# Patient Record
Sex: FEMALE | Race: BLACK OR AFRICAN AMERICAN | Employment: OTHER | URBAN - METROPOLITAN AREA
[De-identification: names, ages, dates, MRNs, and addresses within clinical notes are randomized per-mention and may not be internally consistent; named-entity substitution may affect disease eponyms.]

---

## 2017-06-29 ENCOUNTER — HOSPITAL ENCOUNTER (OUTPATIENT)
Dept: GENERAL RADIOLOGY | Age: 67
Discharge: HOME | End: 2017-06-29
Payer: MEDICARE

## 2017-06-29 DIAGNOSIS — G89.29 CHRONIC PAIN: ICD-10-CM

## 2017-06-29 PROCEDURE — 72100 X-RAY EXAM L-S SPINE 2/3 VWS: CPT

## 2017-06-29 PROCEDURE — 72202 X-RAY EXAM SI JOINTS 3/> VWS: CPT

## 2017-06-29 PROCEDURE — 73502 X-RAY EXAM HIP UNI 2-3 VIEWS: CPT

## 2019-07-30 ENCOUNTER — HOSPITAL ENCOUNTER (OUTPATIENT)
Dept: ULTRASOUND IMAGING | Age: 69
Discharge: HOME | End: 2019-07-30
Attending: INTERNAL MEDICINE
Payer: MEDICARE

## 2019-07-30 DIAGNOSIS — N18.30 CHRONIC KIDNEY DISEASE, STAGE III (MODERATE) (HCC): ICD-10-CM

## 2019-07-30 PROCEDURE — 76770 US EXAM ABDO BACK WALL COMP: CPT

## 2019-12-23 ENCOUNTER — OFFICE VISIT (OUTPATIENT)
Dept: INTERNAL MEDICINE CLINIC | Age: 69
End: 2019-12-23

## 2019-12-23 VITALS
TEMPERATURE: 97.5 F | HEIGHT: 65 IN | OXYGEN SATURATION: 94 % | DIASTOLIC BLOOD PRESSURE: 83 MMHG | WEIGHT: 189.4 LBS | RESPIRATION RATE: 18 BRPM | BODY MASS INDEX: 31.56 KG/M2 | HEART RATE: 72 BPM | SYSTOLIC BLOOD PRESSURE: 120 MMHG

## 2019-12-23 DIAGNOSIS — R73.03 PREDIABETES: ICD-10-CM

## 2019-12-23 DIAGNOSIS — M54.50 CHRONIC LOW BACK PAIN, UNSPECIFIED BACK PAIN LATERALITY, UNSPECIFIED WHETHER SCIATICA PRESENT: ICD-10-CM

## 2019-12-23 DIAGNOSIS — R79.9 ABNORMAL FINDING OF BLOOD CHEMISTRY, UNSPECIFIED: ICD-10-CM

## 2019-12-23 DIAGNOSIS — R21 RASH: ICD-10-CM

## 2019-12-23 DIAGNOSIS — M06.9 RHEUMATOID ARTHRITIS, INVOLVING UNSPECIFIED SITE, UNSPECIFIED RHEUMATOID FACTOR PRESENCE: ICD-10-CM

## 2019-12-23 DIAGNOSIS — Z13.31 SCREENING FOR DEPRESSION: ICD-10-CM

## 2019-12-23 DIAGNOSIS — Z13.39 SCREENING FOR ALCOHOLISM: ICD-10-CM

## 2019-12-23 DIAGNOSIS — M19.90 ARTHRITIS: ICD-10-CM

## 2019-12-23 DIAGNOSIS — I10 ESSENTIAL HYPERTENSION: ICD-10-CM

## 2019-12-23 DIAGNOSIS — N18.30 CKD (CHRONIC KIDNEY DISEASE), STAGE III (HCC): ICD-10-CM

## 2019-12-23 DIAGNOSIS — J40 BRONCHITIS: ICD-10-CM

## 2019-12-23 DIAGNOSIS — Z00.00 MEDICARE ANNUAL WELLNESS VISIT, SUBSEQUENT: Primary | ICD-10-CM

## 2019-12-23 DIAGNOSIS — L98.492 ULCER OF RIGHT GROIN WITH FAT LAYER EXPOSED (HCC): ICD-10-CM

## 2019-12-23 DIAGNOSIS — G89.29 CHRONIC LOW BACK PAIN, UNSPECIFIED BACK PAIN LATERALITY, UNSPECIFIED WHETHER SCIATICA PRESENT: ICD-10-CM

## 2019-12-23 DIAGNOSIS — E78.00 HIGH CHOLESTEROL: ICD-10-CM

## 2019-12-23 RX ORDER — LEFLUNOMIDE 10 MG/1
TABLET ORAL
COMMUNITY
Start: 2019-10-15 | End: 2020-03-23

## 2019-12-23 RX ORDER — CALCIFEDIOL 30 UG/1
CAPSULE, EXTENDED RELEASE ORAL
COMMUNITY
Start: 2019-12-06 | End: 2021-03-09

## 2019-12-23 NOTE — PROGRESS NOTES
1. Have you been to the ER, urgent care clinic since your last visit? Hospitalized since your last visit? No    2. Have you seen or consulted any other health care providers outside of the [address] [place] [address] since your last visit? Include any pap smears or colon screening. No   Patient has a message in her phone for doctor from her daughter  Patient want to discuss facial dryness  This is the Subsequent Medicare Annual Wellness Exam, performed 12 months or more after the Initial AWV or the last Subsequent AWV    I have reviewed the patient's medical history in detail and updated the computerized patient record. History   There is no problem list on file for this patient. Past Medical History:   Diagnosis Date    Arthritis     Bronchitis     Chronic back pain     High cholesterol     Hypertension     Kidney disease     Obesity       Past Surgical History:   Procedure Laterality Date    BREAST SURGERY PROCEDURE UNLISTED      breast reduction    HC BLD ROTATABLE 4MM HR      HX MOHS PROCEDURES      right    PLASTY KNEE,CONSTRAINED PROSTHESIS      IA CORRECT BUNION,SIMPLE       Current Outpatient Medications   Medication Sig Dispense Refill    RAYALDEE 30 mcg Cs24 TAKE 1 CAPSULE BY MOUTH EVERY DAY      leflunomide (ARAVA) 10 mg tablet TAKE 1 TABLET BY MOUTH EVERY DAY      lidocaine (LIDODERM) 5 % Apply patch to the affected area for 12 hours a day and remove for 12 hours a day. 3 Each 0    cyclobenzaprine (FLEXERIL) 10 mg tablet Take 1 Tab by mouth three (3) times daily as needed for Muscle Spasm(s). 10 Tab 0    amLODIPine (NORVASC) 10 mg tablet Take 10 mg by mouth daily.  fluticasone-salmeterol (ADVAIR DISKUS) 250-50 mcg/dose diskus inhaler Take 1 Puff by inhalation every twelve (12) hours.  fluticasone (FLONASE) 50 mcg/actuation nasal spray 2 Sprays by Both Nostrils route nightly.  furosemide (LASIX) 40 mg tablet Take 40 mg by mouth daily.       potassium chloride SA (MICRO-K) 10 mEq capsule Take 10 mEq by mouth.  traMADol-acetaminophen (ULTRACET) 37.5-325 mg per tablet Take 1 Tab by mouth every six (6) hours as needed for Pain. Max Daily Amount: 4 Tabs. 20 Tab 0    methocarbamol (ROBAXIN) 500 mg tablet Take 1 Tab by mouth four (4) times daily. 30 Tab 0    oxyCODONE-acetaminophen (PERCOCET) 5-325 mg per tablet Take 1 Tab by mouth every four (4) hours as needed for Pain. Max Daily Amount: 6 Tabs. 20 Tab 0    aspirin 81 mg chewable tablet Take 81 mg by mouth daily. Allergies   Allergen Reactions    Pcn [Penicillins] Swelling       Family History   Problem Relation Age of Onset    Dementia Mother     Alcohol abuse Father      Social History     Tobacco Use    Smoking status: Former Smoker     Last attempt to quit: 1993     Years since quittin.5    Smokeless tobacco: Never Used   Substance Use Topics    Alcohol use: Yes     Comment: social       Depression Risk Factor Screening:     3 most recent PHQ Screens 2019   Little interest or pleasure in doing things Not at all   Feeling down, depressed, irritable, or hopeless Not at all   Total Score PHQ 2 0       Alcohol Risk Factor Screening:   Do you average 1 drink per night or more than 7 drinks a week:  No    On any one occasion in the past three months have you have had more than 3 drinks containing alcohol:  No      Functional Ability and Level of Safety:   Hearing: Hearing is good. Activities of Daily Living: The home contains: no safety equipment. Patient does total self care    Ambulation: with mild difficulty    Fall Risk:  Fall Risk Assessment, last 12 mths 2019   Able to walk? Yes   Fall in past 12 months?  No       Abuse Screen:  Patient is not abused    Cognitive Screening   Has your family/caregiver stated any concerns about your memory: no  Cognitive Screening: Normal - MMSE (Mini Mental Status Exam)    Patient Care Team   Patient Care Team:  Bong Benavides MD as PCP - General (Internal Medicine)    Assessment/Plan   Education and counseling provided:  Are appropriate based on today's review and evaluation        Health Maintenance Due   Topic Date Due    Hepatitis C Screening  1950    BREAST CANCER SCRN MAMMOGRAM  04/30/1998    FOBT Q 1 YEAR AGE 50-75  04/30/1998    GLAUCOMA SCREENING Q2Y  04/30/2013    Bone Densitometry (Dexa) Screening  04/30/2013    MEDICARE YEARLY EXAM  03/20/2018

## 2019-12-23 NOTE — PATIENT INSTRUCTIONS
Medicare Wellness Visit, Female The best way to live healthy is to have a lifestyle where you eat a well-balanced diet, exercise regularly, limit alcohol use, and quit all forms of tobacco/nicotine, if applicable. Regular preventive services are another way to keep healthy. Preventive services (vaccines, screening tests, monitoring & exams) can help personalize your care plan, which helps you manage your own care. Screening tests can find health problems at the earliest stages, when they are easiest to treat. [facility] follows the current, evidence-based guidelines published by the [facility] ([facility]) when recommending preventive services for our patients. Because we follow these guidelines, sometimes recommendations change over time as research supports it. (For example, mammograms used to be recommended annually. Even though Medicare will still pay for an annual mammogram, the newer guidelines recommend a mammogram every two years for women of average risk). Of course, you and your doctor may decide to screen more often for some diseases, based on your risk and your co-morbidities (chronic disease you are already diagnosed with). Preventive services for you include: - Medicare offers their members a free annual wellness visit, which is time for you and your primary care provider to discuss and plan for your preventive service needs. Take advantage of this benefit every year! 
-All adults over the age of 72 should receive the recommended pneumonia vaccines. Current USPSTF guidelines recommend a series of two vaccines for the best pneumonia protection.  
-All adults should have a flu vaccine yearly and a tetanus vaccine every 10 years.  
-All adults age 48 and older should receive the shingles vaccines (series of two vaccines). -All adults age 38-68 who are overweight should have a diabetes screening test once every three years. -All adults born between [birth year] and 1967 should be screened once for Hepatitis C. 
-Other screening tests and preventive services for persons with diabetes include: an eye exam to screen for diabetic retinopathy, a kidney function test, a foot exam, and stricter control over your cholesterol.  
-Cardiovascular screening for adults with routine risk involves an electrocardiogram (ECG) at intervals determined by your doctor.  
-Colorectal cancer screenings should be done for adults age 54-65 with no increased risk factors for colorectal cancer. There are a number of acceptable methods of screening for this type of cancer. Each test has its own benefits and drawbacks. Discuss with your doctor what is most appropriate for you during your annual wellness visit. The different tests include: colonoscopy (considered the best screening method), a fecal occult blood test, a fecal DNA test, and sigmoidoscopy. 
 
-A bone mass density test is recommended when a woman turns 65 to screen for osteoporosis. This test is only recommended one time, as a screening. Some providers will use this same test as a disease monitoring tool if you already have osteoporosis. -Breast cancer screenings are recommended every other year for women of normal risk, age 54-69. 
-Cervical cancer screenings for women over age 72 are only recommended with certain risk factors. Here is a list of your current Health Maintenance items (your personalized list of preventive services) with a due date: 
Health Maintenance Due Topic Date Due  
 Hepatitis C Test  1950  Mammogram  04/30/1998  Stool testing for trace blood  04/30/1998  Glaucoma Screening   04/30/2013  Bone Mineral Density   04/30/2013 [address] [place] [address] Annual Well Visit  03/20/2018 Body Mass Index: Care Instructions Your Care Instructions Body mass index (BMI) can help you see if your weight is raising your risk for health problems. It uses a formula to compare how much you weigh with how tall you are. · A BMI lower than 18.5 is considered underweight. · A BMI between 18.5 and 24.9 is considered healthy. · A BMI between 25 and 29.9 is considered overweight. A BMI of 30 or higher is considered obese. If your BMI is in the normal range, it means that you have a lower risk for weight-related health problems. If your BMI is in the overweight or obese range, you may be at increased risk for weight-related health problems, such as high blood pressure, heart disease, stroke, arthritis or joint pain, and diabetes. If your BMI is in the underweight range, you may be at increased risk for health problems such as fatigue, lower protection (immunity) against illness, muscle loss, bone loss, hair loss, and hormone problems. BMI is just one measure of your risk for weight-related health problems. You may be at higher risk for health problems if you are not active, you eat an unhealthy diet, or you drink too much alcohol or use tobacco products. Follow-up care is a key part of your treatment and safety. Be sure to make and go to all appointments, and call your doctor if you are having problems. It's also a good idea to know your test results and keep a list of the medicines you take. How can you care for yourself at home? · Practice healthy eating habits. This includes eating plenty of fruits, vegetables, whole grains, lean protein, and low-fat dairy. · If your doctor recommends it, get more exercise. Walking is a good choice. Bit by bit, increase the amount you walk every day. Try for at least 30 minutes on most days of the week. · Do not smoke. Smoking can increase your risk for health problems. If you need help quitting, talk to your doctor about stop-smoking programs and medicines. These can increase your chances of quitting for good. · Limit alcohol to 2 drinks a day for men and 1 drink a day for women.  Too much alcohol can cause health problems. If you have a BMI higher than 25 · Your doctor may do other tests to check your risk for weight-related health problems. This may include measuring the distance around your waist. A waist measurement of more than 40 inches in men or 35 inches in women can increase the risk of weight-related health problems. · Talk with your doctor about steps you can take to stay healthy or improve your health. You may need to make lifestyle changes to lose weight and stay healthy, such as changing your diet and getting regular exercise. If you have a BMI lower than 18.5 · Your doctor may do other tests to check your risk for health problems. · Talk with your doctor about steps you can take to stay healthy or improve your health. You may need to make lifestyle changes to gain or maintain weight and stay healthy, such as getting more healthy foods in your diet and doing exercises to build muscle. Where can you learn more? Go to http://remedios-jinny.info/. Enter S176 in the search box to learn more about \"Body Mass Index: Care Instructions. \" Current as of: October 13, 2016 Content Version: 11.4 © 3945-0414 [employer]. Care instructions adapted under license by [employer] (which disclaims liability or warranty for this information). If you have questions about a medical condition or this instruction, always ask your healthcare professional. [address] any warranty or liability for your use of this information.

## 2019-12-23 NOTE — PROGRESS NOTES
SPORTS MEDICINE AND PRIMARY CARE  Jethro Alegria MD, [address] [address]  Phone:  [phone]  Fax: [phone]    Chief Complaint   Patient presents with    Establish Care       SUBJECTIVE:    Jany Aleman is a 68 y.o. female Patient comes in as a new patient for evaluation and ongoing care. She was previously under the service of Vicky Strange, who recently retired. She sees Josue Byrd for pain management of her back pain. She goes to LIDYA Craven MD at [facility] for management of rheumatoid arthritis. She complains of a rash on her forehead. She also complains of an ulcer in the right groin area. Patient is seen for evaluation. Current Outpatient Medications   Medication Sig Dispense Refill    RAYALDEE 30 mcg Cs24 TAKE 1 CAPSULE BY MOUTH EVERY DAY      leflunomide (ARAVA) 10 mg tablet TAKE 1 TABLET BY MOUTH EVERY DAY      bacitracin-neomycin-polymyxin b-hydrocortisone (CORTISPORIN) 1 % ointment Apply  to affected area two (2) times a day. 15 g 11    lidocaine (LIDODERM) 5 % Apply patch to the affected area for 12 hours a day and remove for 12 hours a day. 3 Each 0    cyclobenzaprine (FLEXERIL) 10 mg tablet Take 1 Tab by mouth three (3) times daily as needed for Muscle Spasm(s). 10 Tab 0    amLODIPine (NORVASC) 10 mg tablet Take 10 mg by mouth daily.  fluticasone-salmeterol (ADVAIR DISKUS) 250-50 mcg/dose diskus inhaler Take 1 Puff by inhalation every twelve (12) hours.  fluticasone (FLONASE) 50 mcg/actuation nasal spray 2 Sprays by Both Nostrils route nightly.  furosemide (LASIX) 40 mg tablet Take 40 mg by mouth daily.  potassium chloride SA (MICRO-K) 10 mEq capsule Take 10 mEq by mouth.  traMADol-acetaminophen (ULTRACET) 37.5-325 mg per tablet Take 1 Tab by mouth every six (6) hours as needed for Pain. Max Daily Amount: 4 Tabs. 20 Tab 0    methocarbamol (ROBAXIN) 500 mg tablet Take 1 Tab by mouth four (4) times daily.  30 Tab 0  oxyCODONE-acetaminophen (PERCOCET) 5-325 mg per tablet Take 1 Tab by mouth every four (4) hours as needed for Pain. Max Daily Amount: 6 Tabs. 20 Tab 0    aspirin 81 mg chewable tablet Take 81 mg by mouth daily.          Past Medical History:   Diagnosis Date    Bronchitis     Chronic back pain     CKD (chronic kidney disease), stage III (HCC)     High cholesterol     Hypertension     Obesity     Rheumatoid arthritis ([facility]     S/P TKR (total knee replacement), right     md yfn    Ulcer of right groin ([facility]      Past Surgical History:   Procedure Laterality Date    BREAST SURGERY PROCEDURE UNLISTED      breast reduction    HC BLD ROTATABLE 4MM HR      HX MOHS PROCEDURES      right    PLASTY KNEE,CONSTRAINED PROSTHESIS      OH CORRECT BUNION,SIMPLE       Allergies   Allergen Reactions    Pcn [Penicillins] Swelling       REVIEW OF SYSTEMS:  General: negative for - chills or fever  ENT: negative for - headaches, nasal congestion or tinnitus  Respiratory: negative for - cough, hemoptysis, shortness of breath or wheezing  Cardiovascular : negative for - chest pain, edema, palpitations or shortness of breath  Gastrointestinal: negative for - abdominal pain, blood in stools, heartburn or nausea/vomiting  Genito-Urinary: no dysuria, trouble voiding, or hematuria  Musculoskeletal: negative for - gait disturbance, joint pain, joint stiffness or joint swelling  Neurological: no TIA or stroke symptoms  Hematologic: no bruises, no bleeding, no swollen glands  Integument: no lumps, mole changes, nail changes or rash  Endocrine:no malaise/lethargy or unexpected weight changes      Social History     Socioeconomic History    Marital status: UNKNOWN     Spouse name: Not on file    Number of children: Not on file    Years of education: Not on file    Highest education level: Not on file   Tobacco Use    Smoking status: Former Smoker     Last attempt to quit: 1993     Years since quittin.5    Smokeless tobacco: Never Used   Substance and Sexual Activity    Alcohol use: Yes     Comment: social    Drug use: No    Sexual activity: Yes     Partners: Male     Family History   Problem Relation Age of Onset    Dementia Mother     Alcohol abuse Father      Habits:  She discontinued cigarettes about 25 years ago. She has occasional beer or glass of wine. No drug abuse. Social History:  The patient is . Her daughter lives with her, who also takes care of her. She has four children, two sons and two daughters, alive and well. 45year old daughter  of MI. She has 17 grandchildren and 6 great grandchildren. Completed her GED and some college. Yazidism preference is Bahai.    Family History:  Father  77 of seizure disorder, alcohol abuse. Mother  66 with dementia. One brother . OBJECTIVE:     Visit Vitals  /83   Pulse 72   Temp 97.5 °F (36.4 °C) (Oral)   Resp 18   Ht 5' 4.5\" (1.638 m)   Wt 189 lb 6.4 oz (85.9 kg)   SpO2 94%   BMI 32.01 kg/m²     CONSTITUTIONAL: well , well nourished, appears age appropriate  EYES: perrla, eom intact  ENMT:moist mucous membranes, pharynx clear  NECK: supple. Thyroid normal  RESPIRATORY: Chest: clear bilaterally  CARDIOVASCULAR: Heart: regular rate and rhythm  GASTROINTESTINAL: Abdomen: soft, bowel sounds active  HEMATOLOGIC: no pathological lymph nodes palpated  MUSCULOSKELETAL: Extremities: no edema, pulse 1+   INTEGUMENT: No unusual rashes or suspicious skin lesions noted. Nails appear normal.  NEUROLOGIC: non-focal exam   MENTAL STATUS: alert and oriented, appropriate affect     No visits with results within 3 Month(s) from this visit. Latest known visit with results is:   Admission on 2015, Discharged on 2015   Component Date Value Ref Range Status    Calcium, ionized (POC) 2015 1.20  1. 12 - 1.32 MMOL/L Final    Sodium (POC) 2015 139  136 - 145 MMOL/L Final    Potassium (POC) 07/28/2015 4.2  3.5 - 5.1 MMOL/L Final    Chloride (POC) 07/28/2015 106  98 - 107 MMOL/L Final    CO2 (POC) 07/28/2015 21  21 - 32 MMOL/L Final    Anion gap (POC) 07/28/2015 16* 5 - 15 mmol/L Final    Glucose (POC) 07/28/2015 93  65 - 105 MG/DL Final    BUN (POC) 07/28/2015 17  9 - 20 MG/DL Final    Creatinine (POC) 07/28/2015 0.8  0.6 - 1.3 MG/DL Final    GFRAA, POC 07/28/2015 >60  >60 ml/min/1.73m2 Final    GFRNA, POC 07/28/2015 >60  >60 ml/min/1.73m2 Final    Comment: Estimated GFR is calculated using the IDMS-traceable Modification of Diet in Renal Disease (MDRD) Study equation, reported for both  Americans (GFRAA) and non- Americans (GFRNA), and normalized to 1.73m2 body surface area. The physician must decide which value applies to the patient. The MDRD study equation should only be used in individuals age 25 or older. It has not been validated for the following: pregnant women, patients with serious comorbid conditions, or on certain medications, or persons with extremes of body size, muscle mass, or nutritional status.  Hemoglobin (POC) 07/28/2015 12.9  11.5 - 16.0 GM/DL Final    Hematocrit (POC) 07/28/2015 38  35.0 - 47.0 % Final    Comment 07/28/2015 Comment Not Indicated. Final       ASSESSMENT:   1. Medicare annual wellness visit, subsequent    2. Screening for alcoholism    3. Screening for depression    4. Essential hypertension    5. High cholesterol    6. CKD (chronic kidney disease), stage III ([address]    7. Chronic low back pain, unspecified back pain laterality, unspecified whether sciatica present    8. Bronchitis    9. Arthritis    10. Rheumatoid arthritis, involving unspecified site, unspecified rheumatoid factor presence ([address]    11. Abnormal finding of blood chemistry, unspecified     12. Prediabetes     13. Ulcer of right groin with fat layer exposed ([address]    14. Rash      BP control is at goal, no adjustments will be made in her medications.     Dyslipidemia will be assessed with a lipid panel. She follows with Dr. Magalys Velasco for chronic dizziness and kidney disease. Will assess her renal function and send her a copy of the results. Chronic low back pain is followed by Lali Griffith MD.    Rheumatoid arthritis [place] [facility]. Will ask for serologic studies to determine if it is sero positive or sero negative. She has a superficial groin ulcer, for which we suggest Bacitracin ointment and dressing changes till healed. She is concerned about a rash on the temporal area, wonders if it is medication related. Will ask dermatology to give us their opinion. She will be back to see us about every three months. Appropriate lab studies will be requested and sent to her in the mail. She is asking for support stockings, for which we send a supply order, but not sure insurance will pay for that. Discussed the patient's BMI with her. The BMI follow up plan is as follows:     dietary management education, guidance, and counseling  encourage exercise  monitor weight  prescribed dietary intake    An After Visit Summary was printed and given to the patient. I have discussed the diagnosis with the patient and the intended plan as seen in the  orders above. The patient understands and agees with the plan. The patient has   received an after visit summary and questions were answered concerning  future plans  Patient labs and/or xrays were reviewed  Past records were reviewed.     PLAN:  .  Orders Placed This Encounter    Depression Screen Annual    AMB SUPPLY ORDER    HEMOGLOBIN A1C WITH EAG    URINALYSIS W/ RFLX MICROSCOPIC    CBC WITH AUTOMATED DIFF    METABOLIC PANEL, COMPREHENSIVE    LIPID PANEL    TSH 3RD GENERATION    RA + CCP ABS    ANDRA W/REFLEX    REFERRAL TO DERMATOLOGY    AMB POC EKG ROUTINE W/ 12 LEADS, INTER & REP    RAYALDEE 30 mcg Cs24    leflunomide (ARAVA) 10 mg tablet    bacitracin-neomycin-polymyxin b-hydrocortisone (CORTISPORIN) 1 % ointment       Follow-up and Dispositions    · Return in about 3 months (around 3/23/2020). ATTENTION:   This medical record was transcribed using an electronic medical records system. Although proofread, it may and can contain electronic and spelling errors. Other human spelling and other errors may be present. Corrections may be executed at a later time. Please feel free to contact us for any clarifications as needed.

## 2019-12-25 LAB
ALBUMIN SERPL-MCNC: 4 G/DL (ref 3.5–4.8)
ALBUMIN/GLOB SERPL: 1.5 {RATIO} (ref 1.2–2.2)
ALP SERPL-CCNC: 86 IU/L (ref 39–117)
ALT SERPL-CCNC: 18 IU/L (ref 0–32)
ANA SER QL: NEGATIVE
APPEARANCE UR: ABNORMAL
AST SERPL-CCNC: 30 IU/L (ref 0–40)
BACTERIA #/AREA URNS HPF: ABNORMAL /[HPF]
BASOPHILS # BLD AUTO: 0.1 X10E3/UL (ref 0–0.2)
BASOPHILS NFR BLD AUTO: 1 %
BILIRUB SERPL-MCNC: 0.3 MG/DL (ref 0–1.2)
BILIRUB UR QL STRIP: NEGATIVE
BUN SERPL-MCNC: 16 MG/DL (ref 8–27)
BUN/CREAT SERPL: 10 (ref 12–28)
CALCIUM SERPL-MCNC: 10 MG/DL (ref 8.7–10.3)
CASTS URNS MICRO: ABNORMAL
CASTS URNS QL MICRO: PRESENT /LPF
CCP IGA+IGG SERPL IA-ACNC: 9 UNITS (ref 0–19)
CHLORIDE SERPL-SCNC: 101 MMOL/L (ref 96–106)
CHOLEST SERPL-MCNC: 203 MG/DL (ref 100–199)
CO2 SERPL-SCNC: 24 MMOL/L (ref 20–29)
COLOR UR: YELLOW
CREAT SERPL-MCNC: 1.6 MG/DL (ref 0.57–1)
CRYSTALS URNS MICRO: ABNORMAL
EOSINOPHIL # BLD AUTO: 0.2 X10E3/UL (ref 0–0.4)
EOSINOPHIL NFR BLD AUTO: 2 %
EPI CELLS #/AREA URNS HPF: ABNORMAL /HPF (ref 0–10)
ERYTHROCYTE [DISTWIDTH] IN BLOOD BY AUTOMATED COUNT: 11.7 % (ref 12.3–15.4)
EST. AVERAGE GLUCOSE BLD GHB EST-MCNC: 103 MG/DL
GLOBULIN SER CALC-MCNC: 2.7 G/DL (ref 1.5–4.5)
GLUCOSE SERPL-MCNC: 93 MG/DL (ref 65–99)
GLUCOSE UR QL: NEGATIVE
HBA1C MFR BLD: 5.2 % (ref 4.8–5.6)
HCT VFR BLD AUTO: 36.9 % (ref 34–46.6)
HDLC SERPL-MCNC: 54 MG/DL
HGB BLD-MCNC: 12.3 G/DL (ref 11.1–15.9)
HGB UR QL STRIP: NEGATIVE
IMM GRANULOCYTES # BLD AUTO: 0 X10E3/UL (ref 0–0.1)
IMM GRANULOCYTES NFR BLD AUTO: 0 %
KETONES UR QL STRIP: NEGATIVE
LDLC SERPL CALC-MCNC: 128 MG/DL (ref 0–99)
LEUKOCYTE ESTERASE UR QL STRIP: ABNORMAL
LYMPHOCYTES # BLD AUTO: 2 X10E3/UL (ref 0.7–3.1)
LYMPHOCYTES NFR BLD AUTO: 28 %
MCH RBC QN AUTO: 32 PG (ref 26.6–33)
MCHC RBC AUTO-ENTMCNC: 33.3 G/DL (ref 31.5–35.7)
MCV RBC AUTO: 96 FL (ref 79–97)
MICRO URNS: ABNORMAL
MONOCYTES # BLD AUTO: 0.4 X10E3/UL (ref 0.1–0.9)
MONOCYTES NFR BLD AUTO: 6 %
MUCOUS THREADS URNS QL MICRO: PRESENT
NEUTROPHILS # BLD AUTO: 4.6 X10E3/UL (ref 1.4–7)
NEUTROPHILS NFR BLD AUTO: 63 %
NITRITE UR QL STRIP: NEGATIVE
PH UR STRIP: 6 [PH] (ref 5–7.5)
PLATELET # BLD AUTO: 221 X10E3/UL (ref 150–450)
POTASSIUM SERPL-SCNC: 4.1 MMOL/L (ref 3.5–5.2)
PROT SERPL-MCNC: 6.7 G/DL (ref 6–8.5)
PROT UR QL STRIP: NEGATIVE
RBC # BLD AUTO: 3.84 X10E6/UL (ref 3.77–5.28)
RBC #/AREA URNS HPF: ABNORMAL /HPF (ref 0–2)
RHEUMATOID FACT SERPL-ACNC: <10 IU/ML (ref 0–13.9)
SODIUM SERPL-SCNC: 142 MMOL/L (ref 134–144)
SP GR UR: 1.02 (ref 1–1.03)
TRIGL SERPL-MCNC: 104 MG/DL (ref 0–149)
TSH SERPL DL<=0.005 MIU/L-ACNC: 0.7 UIU/ML (ref 0.45–4.5)
UNIDENT CRYS URNS QL MICRO: PRESENT
UROBILINOGEN UR STRIP-MCNC: 1 MG/DL (ref 0.2–1)
VLDLC SERPL CALC-MCNC: 21 MG/DL (ref 5–40)
WBC # BLD AUTO: 7.3 X10E3/UL (ref 3.4–10.8)
WBC #/AREA URNS HPF: ABNORMAL /HPF (ref 0–5)

## 2020-01-13 RX ORDER — LEFLUNOMIDE 10 MG/1
TABLET ORAL
Qty: 90 TAB | Refills: 0 | OUTPATIENT
Start: 2020-01-13

## 2020-03-23 ENCOUNTER — OFFICE VISIT (OUTPATIENT)
Dept: INTERNAL MEDICINE CLINIC | Age: 70
End: 2020-03-23

## 2020-03-23 VITALS
BODY MASS INDEX: 32.37 KG/M2 | HEIGHT: 64 IN | OXYGEN SATURATION: 94 % | RESPIRATION RATE: 18 BRPM | SYSTOLIC BLOOD PRESSURE: 131 MMHG | DIASTOLIC BLOOD PRESSURE: 89 MMHG | WEIGHT: 189.6 LBS | TEMPERATURE: 97.8 F | HEART RATE: 71 BPM

## 2020-03-23 DIAGNOSIS — I10 ESSENTIAL HYPERTENSION: Primary | ICD-10-CM

## 2020-03-23 DIAGNOSIS — N18.30 CKD (CHRONIC KIDNEY DISEASE), STAGE III (HCC): ICD-10-CM

## 2020-03-23 DIAGNOSIS — N31.9 NEUROGENIC DYSFUNCTION OF THE URINARY BLADDER: ICD-10-CM

## 2020-03-23 DIAGNOSIS — I44.7 LBBB (LEFT BUNDLE BRANCH BLOCK): ICD-10-CM

## 2020-03-23 DIAGNOSIS — Z12.31 ENCOUNTER FOR SCREENING MAMMOGRAM FOR MALIGNANT NEOPLASM OF BREAST: ICD-10-CM

## 2020-03-23 DIAGNOSIS — G89.29 CHRONIC LOW BACK PAIN, UNSPECIFIED BACK PAIN LATERALITY, UNSPECIFIED WHETHER SCIATICA PRESENT: ICD-10-CM

## 2020-03-23 DIAGNOSIS — M54.50 CHRONIC LOW BACK PAIN, UNSPECIFIED BACK PAIN LATERALITY, UNSPECIFIED WHETHER SCIATICA PRESENT: ICD-10-CM

## 2020-03-23 DIAGNOSIS — M06.9 RHEUMATOID ARTHRITIS, INVOLVING UNSPECIFIED SITE, UNSPECIFIED RHEUMATOID FACTOR PRESENCE: ICD-10-CM

## 2020-03-23 DIAGNOSIS — E78.00 HIGH CHOLESTEROL: ICD-10-CM

## 2020-03-23 DIAGNOSIS — M19.90 ARTHRITIS: ICD-10-CM

## 2020-03-23 RX ORDER — CARVEDILOL 12.5 MG/1
12.5 TABLET ORAL 2 TIMES DAILY WITH MEALS
Qty: 60 TAB | Refills: 3 | Status: SHIPPED | OUTPATIENT
Start: 2020-03-23 | End: 2020-06-15

## 2020-03-23 RX ORDER — AMLODIPINE BESYLATE 5 MG/1
5 TABLET ORAL DAILY
Qty: 30 TAB | Refills: 11 | Status: SHIPPED | OUTPATIENT
Start: 2020-03-23 | End: 2021-10-18

## 2020-03-23 RX ORDER — LEFLUNOMIDE 20 MG/1
20 TABLET ORAL DAILY
Qty: 30 TAB | Refills: 1
Start: 2020-03-23

## 2020-03-23 RX ORDER — POTASSIUM CHLORIDE 1.5 G/1.77G
20 POWDER, FOR SOLUTION ORAL DAILY
Qty: 30 PACKET | Refills: 3 | Status: SHIPPED | OUTPATIENT
Start: 2020-03-23 | End: 2020-06-02

## 2020-03-23 NOTE — PROGRESS NOTES
SPORTS MEDICINE AND PRIMARY CARE  Vicente Ontiveros MD, [address] [address]  Phone:  [phone]  Fax: [phone]       Chief Complaint   Patient presents with    Hypertension   . SUBJECTIVE:    Meaghan Torrez is a 68 y.o. female Patient returns today with known history of rheumatoid arthritis, followed by [facility], neurogenic bladder with incontinence and urge incontinence, that is not controlled with Oxybutynin, obesity, primary hypertension, left bundle branch block, dyslipidemia, chronic kidney disease, stage 3, chronic back pain, arthritis, and is seen for evaluation. She complains of urinary incontinence and would like something more to help with her incontinence. Complains of low back pain. This is followed by Dr. Fariha Nielsen office. Denies other new complaints and is seen for evaluation. Current Outpatient Medications   Medication Sig Dispense Refill    RAYALDEE 30 mcg Cs24 TAKE 1 CAPSULE BY MOUTH EVERY DAY      bacitracin-neomycin-polymyxin b-hydrocortisone (CORTISPORIN) 1 % ointment Apply  to affected area two (2) times a day. 15 g 11    lidocaine (LIDODERM) 5 % Apply patch to the affected area for 12 hours a day and remove for 12 hours a day. 3 Each 0    cyclobenzaprine (FLEXERIL) 10 mg tablet Take 1 Tab by mouth three (3) times daily as needed for Muscle Spasm(s). 10 Tab 0    oxyCODONE-acetaminophen (PERCOCET) 5-325 mg per tablet Take 1 Tab by mouth every four (4) hours as needed for Pain. Max Daily Amount: 6 Tabs. 20 Tab 0    fluticasone-salmeterol (ADVAIR DISKUS) 250-50 mcg/dose diskus inhaler Take 1 Puff by inhalation every twelve (12) hours.  fluticasone (FLONASE) 50 mcg/actuation nasal spray 2 Sprays by Both Nostrils route nightly.  furosemide (LASIX) 40 mg tablet Take 40 mg by mouth daily.  amLODIPine (NORVASC) 5 mg tablet Take 1 Tab by mouth daily.  30 Tab 11    carvediloL (COREG) 12.5 mg tablet Take 1 Tab by mouth two (2) times daily (with meals). 60 Tab 3    leflunomide (ARAVA) 20 mg tablet Take 1 Tab by mouth daily. 30 Tab 1    potassium chloride (KLOR-CON) 20 mEq pack Take 1 Packet by mouth daily. 30 Packet 3    traMADol-acetaminophen (ULTRACET) 37.5-325 mg per tablet Take 1 Tab by mouth every six (6) hours as needed for Pain. Max Daily Amount: 4 Tabs. 20 Tab 0    aspirin 81 mg chewable tablet Take 81 mg by mouth daily.          Past Medical History:   Diagnosis Date    Bronchitis     Chronic back pain     simin dixon md    CKD (chronic kidney disease), stage III (HCC)     High cholesterol     Hypertension     mary landaverde md    LBBB (left bundle branch block) 01/29/2012    Neurogenic dysfunction of the urinary bladder     Obesity     Rheumatoid arthritis ([facility]     tiffanie campbell md rheum -vcu    S/P TKR (total knee replacement), right 2007    md yfn    Ulcer of right groin ([facility] [address] [facility]      Past Surgical History:   Procedure Laterality Date    BREAST SURGERY PROCEDURE UNLISTED      breast reduction    HC BLD ROTATABLE 4MM HR      HX MOHS PROCEDURES      right    PLASTY KNEE,CONSTRAINED PROSTHESIS      IL CORRECT BUNION,SIMPLE       Allergies   Allergen Reactions    Pcn [Penicillins] Swelling         REVIEW OF SYSTEMS:  General: negative for - chills or fever  ENT: negative for - headaches, nasal congestion or tinnitus  Respiratory: negative for - cough, hemoptysis, shortness of breath or wheezing  Cardiovascular : negative for - chest pain, edema, palpitations or shortness of breath  Gastrointestinal: negative for - abdominal pain, blood in stools, heartburn or nausea/vomiting  Genito-Urinary: no dysuria, trouble voiding, or hematuria  Musculoskeletal: negative for - gait disturbance, joint pain, joint stiffness or joint swelling  Neurological: no TIA or stroke symptoms  Hematologic: no bruises, no bleeding, no swollen glands  Integument: no lumps, mole changes, nail changes or rash  Endocrine: no malaise/lethargy or unexpected weight changes      Social History     Socioeconomic History    Marital status:      Spouse name: Not on file    Number of children: Not on file    Years of education: Not on file    Highest education level: Not on file   Tobacco Use    Smoking status: Former Smoker     Last attempt to quit: 1993     Years since quittin.7    Smokeless tobacco: Never Used   Substance and Sexual Activity    Alcohol use: Yes     Comment: social    Drug use: No    Sexual activity: Yes     Partners: Male   Social History Narrative    Habits:  She discontinued cigarettes about 25 years ago. She has occasional beer or glass of wine. No drug abuse.         Social History:  The patient is . Her daughter lives with her, who also takes care of her. She has four children, two sons and two daughters, alive and well. 45year old daughter  of MI. She has 17 grandchildren and 6 great grandchildren. Completed her GED and some college. Zoroastrianism preference is Latter day.         Family History:  Father  77 of seizure disorder, alcohol abuse. Mother  66 with dementia. One brother . Family History   Problem Relation Age of Onset    Dementia Mother     Alcohol abuse Father        OBJECTIVE:    Visit Vitals  /89   Pulse 71   Temp 97.8 °F (36.6 °C) (Oral)   Resp 18   Ht 5' 4\" (1.626 m)   Wt 189 lb 9.6 oz (86 kg)   SpO2 94%   BMI 32.54 kg/m²     CONSTITUTIONAL: well , well nourished, appears age appropriate  EYES: perrla, eom intact  ENMT:moist mucous membranes, pharynx clear  NECK: supple. Thyroid normal  RESPIRATORY: Chest: clear bilaterally   CARDIOVASCULAR: Heart: regular rate and rhythm  GASTROINTESTINAL: Abdomen: soft, bowel sounds active  HEMATOLOGIC: no pathological lymph nodes palpated  MUSCULOSKELETAL: Extremities: no edema, pulse 1+   INTEGUMENT: No unusual rashes or suspicious skin lesions noted.  Nails appear normal.  NEUROLOGIC: non-focal exam   MENTAL STATUS: alert and oriented, appropriate affect           ASSESSMENT:  1. Essential hypertension    2. Neurogenic dysfunction of the urinary bladder    3. Rheumatoid arthritis, involving unspecified site, unspecified rheumatoid factor presence ([facility] [address]    4. LBBB (left bundle branch block)    5. High cholesterol    6. CKD (chronic kidney disease), stage III ([facility] [address]    7. Chronic low back pain, unspecified back pain laterality, unspecified whether sciatica present    8. Arthritis    9. Encounter for screening mammogram for malignant neoplasm of breast       Blood pressure control is adequate, no titration is needed. For the neurogenic bladder and urinary incontinence, referral to urology will be requested. Her rheumatoid arthritis is currently quiescent and followed by [facility]. Dyslipidemia, for which she is taking a statin. We continue to monitor kidneys with appropriate lab studies, which will be checked today. The chronic back pain and arthritis pain she is having is treated with narcotics through Dr. Tommie Plasencia office. She will be back to see us in three to four months, sooner if needed. I have discussed the diagnosis with the patient and the intended plan as seen in the  orders above. The patient understands and agees with the plan. The patient has   received an after visit summary and questions were answered concerning  future plans  Patient labs and/or xrays were reviewed  Past records were reviewed. PLAN:  .  Orders Placed This Encounter    AMB SUPPLY ORDER    JOAQUÍN MAMMO BI SCREENING INCL CAD    HEPATITIS C AB    CBC WITH AUTOMATED DIFF    RENAL FUNCTION PANEL    REFERRAL TO UROLOGY    amLODIPine (NORVASC) 5 mg tablet    carvediloL (COREG) 12.5 mg tablet    leflunomide (ARAVA) 20 mg tablet    potassium chloride (KLOR-CON) 20 mEq pack       Follow-up and Dispositions    · Return in about 4 months (around 7/23/2020).                 ATTENTION:   This medical record was transcribed using an electronic medical records system. Although proofread, it may and can contain electronic and spelling errors. Other human spelling and other errors may be present. Corrections may be executed at a later time. Please feel free to contact us for any clarifications as needed.

## 2020-03-23 NOTE — ASSESSMENT & PLAN NOTE
This condition is managed by Specialist.  Lab Results   Component Value Date/Time    WBC 7.3 12/23/2019 12:37 PM    HGB 12.3 12/23/2019 12:37 PM    HCT 36.9 12/23/2019 12:37 PM    PLATELET 236 85/81/8263 12:37 PM    Creatinine 1.60 12/23/2019 12:37 PM    BUN 16 12/23/2019 12:37 PM    Potassium 4.1 12/23/2019 12:37 PM

## 2020-03-23 NOTE — PROGRESS NOTES
1. Have you been to the ER, urgent care clinic since your last visit? Hospitalized since your last visit? No    2. Have you seen or consulted any other health care providers outside of the [address] [place] [address] since your last visit? Include any pap smears or colon screening.  No     Wants to discuss BP, foot swelling and depends

## 2020-03-24 LAB
ALBUMIN SERPL-MCNC: 3.5 G/DL (ref 3.7–4.7)
BASOPHILS # BLD AUTO: 0 X10E3/UL (ref 0–0.2)
BASOPHILS NFR BLD AUTO: 1 %
BUN SERPL-MCNC: 21 MG/DL (ref 8–27)
BUN/CREAT SERPL: 15 (ref 12–28)
CALCIUM SERPL-MCNC: 9.2 MG/DL (ref 8.7–10.3)
CHLORIDE SERPL-SCNC: 105 MMOL/L (ref 96–106)
CO2 SERPL-SCNC: 25 MMOL/L (ref 20–29)
CREAT SERPL-MCNC: 1.43 MG/DL (ref 0.57–1)
EOSINOPHIL # BLD AUTO: 0.1 X10E3/UL (ref 0–0.4)
EOSINOPHIL NFR BLD AUTO: 2 %
ERYTHROCYTE [DISTWIDTH] IN BLOOD BY AUTOMATED COUNT: 11.8 % (ref 11.7–15.4)
GLUCOSE SERPL-MCNC: 100 MG/DL (ref 65–99)
HCT VFR BLD AUTO: 34.4 % (ref 34–46.6)
HCV AB S/CO SERPL IA: <0.1 S/CO RATIO (ref 0–0.9)
HGB BLD-MCNC: 11.1 G/DL (ref 11.1–15.9)
IMM GRANULOCYTES # BLD AUTO: 0 X10E3/UL (ref 0–0.1)
IMM GRANULOCYTES NFR BLD AUTO: 0 %
LYMPHOCYTES # BLD AUTO: 1.7 X10E3/UL (ref 0.7–3.1)
LYMPHOCYTES NFR BLD AUTO: 24 %
MCH RBC QN AUTO: 29.7 PG (ref 26.6–33)
MCHC RBC AUTO-ENTMCNC: 32.3 G/DL (ref 31.5–35.7)
MCV RBC AUTO: 92 FL (ref 79–97)
MONOCYTES # BLD AUTO: 0.4 X10E3/UL (ref 0.1–0.9)
MONOCYTES NFR BLD AUTO: 6 %
NEUTROPHILS # BLD AUTO: 4.6 X10E3/UL (ref 1.4–7)
NEUTROPHILS NFR BLD AUTO: 67 %
PHOSPHATE SERPL-MCNC: 2.6 MG/DL (ref 3–4.3)
PLATELET # BLD AUTO: 175 X10E3/UL (ref 150–450)
POTASSIUM SERPL-SCNC: 4.3 MMOL/L (ref 3.5–5.2)
RBC # BLD AUTO: 3.74 X10E6/UL (ref 3.77–5.28)
SODIUM SERPL-SCNC: 143 MMOL/L (ref 134–144)
WBC # BLD AUTO: 6.9 X10E3/UL (ref 3.4–10.8)

## 2020-05-27 ENCOUNTER — HOSPITAL ENCOUNTER (OUTPATIENT)
Dept: MAMMOGRAPHY | Age: 70
Discharge: HOME | End: 2020-05-27
Attending: INTERNAL MEDICINE
Payer: MEDICARE

## 2020-05-27 DIAGNOSIS — I10 ESSENTIAL HYPERTENSION: ICD-10-CM

## 2020-05-27 DIAGNOSIS — Z12.31 ENCOUNTER FOR SCREENING MAMMOGRAM FOR MALIGNANT NEOPLASM OF BREAST: ICD-10-CM

## 2020-05-27 PROCEDURE — 77067 SCR MAMMO BI INCL CAD: CPT

## 2020-06-02 RX ORDER — POTASSIUM CHLORIDE 1.5 G/1.77G
POWDER, FOR SOLUTION ORAL
Qty: 90 PACKET | Refills: 1 | Status: SHIPPED | OUTPATIENT
Start: 2020-06-02

## 2020-06-15 RX ORDER — CARVEDILOL 12.5 MG/1
TABLET ORAL
Qty: 180 TAB | Refills: 1 | Status: SHIPPED | OUTPATIENT
Start: 2020-06-15

## 2020-06-23 ENCOUNTER — OFFICE VISIT (OUTPATIENT)
Dept: INTERNAL MEDICINE CLINIC | Age: 70
End: 2020-06-23

## 2020-06-23 VITALS
BODY MASS INDEX: 30.27 KG/M2 | OXYGEN SATURATION: 96 % | SYSTOLIC BLOOD PRESSURE: 143 MMHG | HEART RATE: 69 BPM | TEMPERATURE: 98 F | DIASTOLIC BLOOD PRESSURE: 80 MMHG | HEIGHT: 64 IN | WEIGHT: 177.3 LBS | RESPIRATION RATE: 18 BRPM

## 2020-06-23 DIAGNOSIS — R68.89 OTHER GENERAL SYMPTOMS AND SIGNS: ICD-10-CM

## 2020-06-23 DIAGNOSIS — N18.30 CKD (CHRONIC KIDNEY DISEASE), STAGE III (HCC): ICD-10-CM

## 2020-06-23 DIAGNOSIS — M19.90 ARTHRITIS: ICD-10-CM

## 2020-06-23 DIAGNOSIS — I10 ESSENTIAL HYPERTENSION: ICD-10-CM

## 2020-06-23 DIAGNOSIS — E78.00 HIGH CHOLESTEROL: ICD-10-CM

## 2020-06-23 DIAGNOSIS — M06.9 RHEUMATOID ARTHRITIS, INVOLVING UNSPECIFIED SITE, UNSPECIFIED RHEUMATOID FACTOR PRESENCE: Primary | ICD-10-CM

## 2020-06-23 DIAGNOSIS — N31.9 NEUROGENIC DYSFUNCTION OF THE URINARY BLADDER: ICD-10-CM

## 2020-06-23 RX ORDER — DICLOFENAC SODIUM 10 MG/G
GEL TOPICAL 4 TIMES DAILY
Qty: 1 EACH | Refills: 11
Start: 2020-06-23 | End: 2021-10-18

## 2020-06-23 RX ORDER — FLUTICASONE PROPIONATE AND SALMETEROL 250; 50 UG/1; UG/1
1 POWDER RESPIRATORY (INHALATION) EVERY 12 HOURS
Qty: 1 EACH | Refills: 11 | Status: SHIPPED | OUTPATIENT
Start: 2020-06-23 | End: 2021-06-25 | Stop reason: ALTCHOICE

## 2020-06-23 RX ORDER — ATORVASTATIN CALCIUM 40 MG/1
40 TABLET, FILM COATED ORAL
Qty: 30 TAB | Refills: 11 | Status: SHIPPED | OUTPATIENT
Start: 2020-06-23 | End: 2021-06-22 | Stop reason: SDUPTHER

## 2020-06-23 RX ORDER — FLUTICASONE PROPIONATE 50 MCG
2 SPRAY, SUSPENSION (ML) NASAL DAILY
Qty: 1 BOTTLE | Refills: 11 | Status: SHIPPED | OUTPATIENT
Start: 2020-06-23 | End: 2021-06-22 | Stop reason: SDUPTHER

## 2020-06-23 NOTE — PROGRESS NOTES
1. Have you been to the ER, urgent care clinic since your last visit? Hospitalized since your last visit? No    2. Have you seen or consulted any other health care providers outside of the [address] [place] [address] since your last visit? Include any pap smears or colon screening.  No     Wants to discuss taking a vitamin

## 2020-06-23 NOTE — PROGRESS NOTES
SPORTS MEDICINE AND PRIMARY CARE  Sidney Davis MD, [address] [address]  Phone:  [phone]  Fax: [phone]       Chief Complaint   Patient presents with    Hypertension   . SUBJECTIVE:    Bulmaro Melchor is a 65 y.o. female Patient returns today with a known history of rheumatoid arthritis, followed by Dr. Aleyda Hwakins, neurogenic urinary bladder, primary hypertension, dyslipidemia, CKD stage 3, DJD and is seen for evaluation. She wonders if she needs a B12 shot because she feels tired all the time. She is upset today because her granddaughter fell off a bike at 6years old. She told them to take her to the emergency room, but they did not do so, and the following day she was found to have a fractured hand. This has upset the patient. Patient is seen for evaluation. Current Outpatient Medications   Medication Sig Dispense Refill    fluticasone propion-salmeteroL (Advair Diskus) 250-50 mcg/dose diskus inhaler Take 1 Puff by inhalation every twelve (12) hours. 1 Each 11    fluticasone propionate (Flonase) 50 mcg/actuation nasal spray 2 Sprays by Both Nostrils route daily. 1 Bottle 11    diclofenac (VOLTAREN) 1 % gel Apply  to affected area four (4) times daily. 1 Each 11    atorvastatin (LIPITOR) 40 mg tablet Take 1 Tab by mouth nightly. 30 Tab 11    carvediloL (COREG) 12.5 mg tablet TAKE 1 TABLET BY MOUTH TWICE A DAY WITH MEALS 180 Tab 1    potassium chloride (KLOR-CON) 20 mEq pack TAKE 1 PACKET DAILY BY MOUTH 90 Packet 1    amLODIPine (NORVASC) 5 mg tablet Take 1 Tab by mouth daily. 30 Tab 11    leflunomide (ARAVA) 20 mg tablet Take 1 Tab by mouth daily. 30 Tab 1    RAYALDEE 30 mcg Cs24 TAKE 1 CAPSULE BY MOUTH EVERY DAY      bacitracin-neomycin-polymyxin b-hydrocortisone (CORTISPORIN) 1 % ointment Apply  to affected area two (2) times a day.  15 g 11    cyclobenzaprine (FLEXERIL) 10 mg tablet Take 1 Tab by mouth three (3) times daily as needed for Muscle Spasm(s). 10 Tab 0    oxyCODONE-acetaminophen (PERCOCET) 5-325 mg per tablet Take 1 Tab by mouth every four (4) hours as needed for Pain. Max Daily Amount: 6 Tabs. 20 Tab 0    furosemide (LASIX) 40 mg tablet Take 40 mg by mouth daily.        Past Medical History:   Diagnosis Date    Bronchitis     Chronic back pain     simin dixon md    CKD (chronic kidney disease), stage III ([facility] [address]     High cholesterol     Hypertension     mary landaverde md    LBBB (left bundle branch block) 01/29/2012    Neurogenic dysfunction of the urinary bladder     Obesity     Rheumatoid arthritis ([facility] [address]     tiffanie campbell md rheum -vcu    S/P TKR (total knee replacement), right 2007    md yfn    [facility]      Past Surgical History:   Procedure Laterality Date    BREAST SURGERY PROCEDURE UNLISTED      breast reduction    HC BLD ROTATABLE 4MM HR      HX BREAST REDUCTION Bilateral     2013    HX MOHS PROCEDURES      right    PLASTY KNEE,CONSTRAINED PROSTHESIS      SD CORRECT BUNION,SIMPLE       Allergies   Allergen Reactions    Pcn [Penicillins] Swelling         REVIEW OF SYSTEMS:  General: negative for - chills or fever  ENT: negative for - headaches, nasal congestion or tinnitus  Respiratory: negative for - cough, hemoptysis, shortness of breath or wheezing  Cardiovascular : negative for - chest pain, edema, palpitations or shortness of breath  Gastrointestinal: negative for - abdominal pain, blood in stools, heartburn or nausea/vomiting  Genito-Urinary: no dysuria, trouble voiding, or hematuria  Musculoskeletal: negative for - gait disturbance, joint pain, joint stiffness or joint swelling  Neurological: no TIA or stroke symptoms  Hematologic: no bruises, no bleeding, no swollen glands  Integument: no lumps, mole changes, nail changes or rash  Endocrine: no malaise/lethargy or unexpected weight changes      Social History     Socioeconomic History    Marital status:      Spouse name: Not on file    Number of children: Not on file    Years of education: Not on file    Highest education level: Not on file   Tobacco Use    Smoking status: Former Smoker     Last attempt to quit: 1993     Years since quittin.0    Smokeless tobacco: Never Used   Substance and Sexual Activity    Alcohol use: Yes     Comment: social    Drug use: No    Sexual activity: Yes     Partners: Male   Social History Narrative    Habits:  She discontinued cigarettes about 25 years ago. She has occasional beer or glass of wine. No drug abuse.         Social History:  The patient is . Her daughter lives with her, who also takes care of her. She has four children, two sons and two daughters, alive and well. 45year old daughter  of MI. She has 17 grandchildren and 6 great grandchildren. Completed her GED and some college. Restorationism preference is Restorationist.         Family History:  Father  77 of seizure disorder, alcohol abuse. Mother  66 with dementia. One brother . Family History   Problem Relation Age of Onset    Dementia Mother     Alcohol abuse Father        OBJECTIVE:    Visit Vitals  BP (!) 165/100   Pulse 69   Temp 98 °F (36.7 °C) (Oral)   Resp 18   Ht 5' 4\" (1.626 m)   Wt 177 lb 4.8 oz (80.4 kg)   SpO2 96%   BMI 30.43 kg/m²     CONSTITUTIONAL: well , well nourished, appears age appropriate  EYES: perrla, eom intact  ENMT:moist mucous membranes, pharynx clear  NECK: supple. Thyroid normal  RESPIRATORY: Chest: clear bilaterally   CARDIOVASCULAR: Heart: regular rate and rhythm  GASTROINTESTINAL: Abdomen: soft, bowel sounds active  HEMATOLOGIC: no pathological lymph nodes palpated  MUSCULOSKELETAL: Extremities: no edema, pulse 1+   INTEGUMENT: No unusual rashes or suspicious skin lesions noted. Nails appear normal.  NEUROLOGIC: non-focal exam   MENTAL STATUS: alert and oriented, appropriate affect           ASSESSMENT:  1.  Rheumatoid arthritis, involving unspecified site, unspecified rheumatoid factor presence ([facility] [address]    2. Neurogenic dysfunction of the urinary bladder    3. Essential hypertension    4. High cholesterol    5. CKD (chronic kidney disease), stage III ([address]    6. Arthritis    7. Other general symptoms and signs       The rheumatoid arthritis seems to be controlled with the DMARD she is currently using. She keeps her appointments with the rheumatologist on a regular basis. Neurogenic bladder remains of concern. Blood pressure is up related to the stresses related to her grandchild. Repeat blood pressure, however, is coming down to 143/80. Normally her blood pressure is in the 120-130s and therefore no adjustment will be made. There is a history of dyslipidemia with the last cholesterol in December of 203, triglycerides 104, HDL 54 and . She is not taking a statin currently and we would like to place her on Lipitor 40 mg daily. Chronic kidney disease is followed by Dr. Abel Riedel, stage 3. She will return to the office in three to four months, sooner if needed. We are concerned about her weight loss. She has lost 11 pounds since we last saw her. She states she does not have an appetite and does not eat as much as she used to. Her metabolic evaluation may be helpful. We will also check her thyroid. She states that before she gained all this weight she was around 145. I have discussed the diagnosis with the patient and the intended plan as seen in the  orders above. The patient understands and agees with the plan. The patient has   received an after visit summary and questions were answered concerning  future plans  Patient labs and/or xrays were reviewed  Past records were reviewed.     PLAN:  .  Orders Placed This Encounter    RENAL FUNCTION PANEL    VITAMIN B12 & FOLATE    VITAMIN D, 25 HYDROXY    CBC WITH AUTOMATED DIFF    T4, FREE    REFERRAL TO OPHTHALMOLOGY    fluticasone propion-salmeteroL (Advair Diskus) 250-50 mcg/dose diskus inhaler    fluticasone propionate (Flonase) 50 mcg/actuation nasal spray    diclofenac (VOLTAREN) 1 % gel    atorvastatin (LIPITOR) 40 mg tablet       Follow-up and Dispositions    · Return in about 3 months (around 9/23/2020). ATTENTION:   This medical record was transcribed using an electronic medical records system. Although proofread, it may and can contain electronic and spelling errors. Other human spelling and other errors may be present. Corrections may be executed at a later time. Please feel free to contact us for any clarifications as needed.

## 2020-06-24 LAB
25(OH)D3+25(OH)D2 SERPL-MCNC: 89 NG/ML (ref 30–100)
BASOPHILS # BLD AUTO: 0.1 X10E3/UL (ref 0–0.2)
BASOPHILS NFR BLD AUTO: 1 %
EOSINOPHIL # BLD AUTO: 0.1 X10E3/UL (ref 0–0.4)
EOSINOPHIL NFR BLD AUTO: 2 %
ERYTHROCYTE [DISTWIDTH] IN BLOOD BY AUTOMATED COUNT: 11.8 % (ref 11.7–15.4)
FOLATE SERPL-MCNC: >20 NG/ML
HCT VFR BLD AUTO: 33.1 % (ref 34–46.6)
HGB BLD-MCNC: 11.3 G/DL (ref 11.1–15.9)
IMM GRANULOCYTES # BLD AUTO: 0 X10E3/UL (ref 0–0.1)
IMM GRANULOCYTES NFR BLD AUTO: 0 %
LYMPHOCYTES # BLD AUTO: 2 X10E3/UL (ref 0.7–3.1)
LYMPHOCYTES NFR BLD AUTO: 26 %
MCH RBC QN AUTO: 32.4 PG (ref 26.6–33)
MCHC RBC AUTO-ENTMCNC: 34.1 G/DL (ref 31.5–35.7)
MCV RBC AUTO: 95 FL (ref 79–97)
MONOCYTES # BLD AUTO: 0.5 X10E3/UL (ref 0.1–0.9)
MONOCYTES NFR BLD AUTO: 6 %
NEUTROPHILS # BLD AUTO: 5 X10E3/UL (ref 1.4–7)
NEUTROPHILS NFR BLD AUTO: 65 %
PLATELET # BLD AUTO: 159 X10E3/UL (ref 150–450)
RBC # BLD AUTO: 3.49 X10E6/UL (ref 3.77–5.28)
T4 FREE SERPL-MCNC: 1.13 NG/DL (ref 0.82–1.77)
VIT B12 SERPL-MCNC: 845 PG/ML (ref 232–1245)
WBC # BLD AUTO: 7.7 X10E3/UL (ref 3.4–10.8)

## 2020-08-06 RX ORDER — DIPHENHYDRAMINE HYDROCHLORIDE 25 MG/1
CAPSULE ORAL
Qty: 120 CAP | Refills: 3 | Status: SHIPPED | OUTPATIENT
Start: 2020-08-06 | End: 2021-05-28

## 2020-08-13 RX ORDER — DIPHENHYDRAMINE HCL 50 MG
50 CAPSULE ORAL 2 TIMES DAILY
Qty: 60 CAP | Refills: 5 | Status: SHIPPED | OUTPATIENT
Start: 2020-08-13 | End: 2020-08-23

## 2020-10-21 ENCOUNTER — OFFICE VISIT (OUTPATIENT)
Dept: INTERNAL MEDICINE CLINIC | Age: 70
End: 2020-10-21
Payer: MEDICAID

## 2020-10-21 DIAGNOSIS — G89.29 CHRONIC LOW BACK PAIN, UNSPECIFIED BACK PAIN LATERALITY, UNSPECIFIED WHETHER SCIATICA PRESENT: ICD-10-CM

## 2020-10-21 DIAGNOSIS — M54.50 CHRONIC LOW BACK PAIN, UNSPECIFIED BACK PAIN LATERALITY, UNSPECIFIED WHETHER SCIATICA PRESENT: ICD-10-CM

## 2020-10-21 DIAGNOSIS — N18.31 STAGE 3A CHRONIC KIDNEY DISEASE (HCC): ICD-10-CM

## 2020-10-21 DIAGNOSIS — I10 ESSENTIAL HYPERTENSION: Primary | ICD-10-CM

## 2020-10-21 DIAGNOSIS — E78.00 HIGH CHOLESTEROL: ICD-10-CM

## 2020-10-21 DIAGNOSIS — M06.9 RHEUMATOID ARTHRITIS, INVOLVING UNSPECIFIED SITE, UNSPECIFIED WHETHER RHEUMATOID FACTOR PRESENT (HCC): ICD-10-CM

## 2020-10-21 PROCEDURE — G8432 DEP SCR NOT DOC, RNG: HCPCS | Performed by: INTERNAL MEDICINE

## 2020-10-21 PROCEDURE — 3017F COLORECTAL CA SCREEN DOC REV: CPT | Performed by: INTERNAL MEDICINE

## 2020-10-21 PROCEDURE — G8753 SYS BP > OR = 140: HCPCS | Performed by: INTERNAL MEDICINE

## 2020-10-21 PROCEDURE — G8536 NO DOC ELDER MAL SCRN: HCPCS | Performed by: INTERNAL MEDICINE

## 2020-10-21 PROCEDURE — 1101F PT FALLS ASSESS-DOCD LE1/YR: CPT | Performed by: INTERNAL MEDICINE

## 2020-10-21 PROCEDURE — G8755 DIAS BP > OR = 90: HCPCS | Performed by: INTERNAL MEDICINE

## 2020-10-21 PROCEDURE — 1090F PRES/ABSN URINE INCON ASSESS: CPT | Performed by: INTERNAL MEDICINE

## 2020-10-21 PROCEDURE — G8417 CALC BMI ABV UP PARAM F/U: HCPCS | Performed by: INTERNAL MEDICINE

## 2020-10-21 PROCEDURE — 99214 OFFICE O/P EST MOD 30 MIN: CPT | Performed by: INTERNAL MEDICINE

## 2020-10-21 PROCEDURE — G9899 SCRN MAM PERF RSLTS DOC: HCPCS | Performed by: INTERNAL MEDICINE

## 2020-10-21 PROCEDURE — G8400 PT W/DXA NO RESULTS DOC: HCPCS | Performed by: INTERNAL MEDICINE

## 2020-10-21 PROCEDURE — G8427 DOCREV CUR MEDS BY ELIG CLIN: HCPCS | Performed by: INTERNAL MEDICINE

## 2020-10-21 NOTE — PROGRESS NOTES
SPORTS MEDICINE AND PRIMARY CARE  Arabella Sanchez MD, [address] [address]  Phone:  [phone]  Fax: [phone]       Chief Complaint   Patient presents with    Hypertension   . SUBJECTIVE:    Sabrina Fallon is a 65 y.o. female Patient returns today with a known history of primary hypertension, CKD stage 3, obesity, dyslipidemia, chronic back pain, followed by Dr. Jacquelin Royal, rheumatoid arthritis, followed by [facility], and is seen for evaluation. Patient returns today saying she saw Dr. Rambo Chiang yesterday at [facility] for management of her chronic pain. She was previously seeing Dr. Karyn Steen for the pain, but he no longer takes her insurance. Dr. Rambo Chiang thought she may have compression fracture of her back, took x-rays, those are not available to her at this time. She is going to place her on Lyrica for the discomfort. Patient thinks the rheumatoid arthritis is flaring up and therefore has an appointment to see rheumatologist, Dr Concepcion Kemp at [facility], tomorrow. Patient complains of hands, particularly the left, her shoulder and her back and her legs are not as steady as they used to be. Patient is seen for evaluation. Current Outpatient Medications   Medication Sig Dispense Refill    Allergy 25 mg capsule TAKE 2 CAPSULES BY MOUTH EVERY 12 HOURS AS NEEDED 120 Cap 3    fluticasone propion-salmeteroL (Advair Diskus) 250-50 mcg/dose diskus inhaler Take 1 Puff by inhalation every twelve (12) hours. 1 Each 11    fluticasone propionate (Flonase) 50 mcg/actuation nasal spray 2 Sprays by Both Nostrils route daily. 1 Bottle 11    diclofenac (VOLTAREN) 1 % gel Apply  to affected area four (4) times daily. 1 Each 11    atorvastatin (LIPITOR) 40 mg tablet Take 1 Tab by mouth nightly.  30 Tab 11    carvediloL (COREG) 12.5 mg tablet TAKE 1 TABLET BY MOUTH TWICE A DAY WITH MEALS 180 Tab 1    potassium chloride (KLOR-CON) 20 mEq pack TAKE 1 PACKET DAILY BY MOUTH 90 Packet 1    amLODIPine (NORVASC) 5 mg tablet Take 1 Tab by mouth daily. 30 Tab 11    leflunomide (ARAVA) 20 mg tablet Take 1 Tab by mouth daily. 30 Tab 1    RAYALDEE 30 mcg Cs24 TAKE 1 CAPSULE BY MOUTH EVERY DAY      bacitracin-neomycin-polymyxin b-hydrocortisone (CORTISPORIN) 1 % ointment Apply  to affected area two (2) times a day. 15 g 11    cyclobenzaprine (FLEXERIL) 10 mg tablet Take 1 Tab by mouth three (3) times daily as needed for Muscle Spasm(s). 10 Tab 0    furosemide (LASIX) 40 mg tablet Take 40 mg by mouth daily.  oxyCODONE-acetaminophen (PERCOCET) 5-325 mg per tablet Take 1 Tab by mouth every four (4) hours as needed for Pain. Max Daily Amount: 6 Tabs.  20 Tab 0     Past Medical History:   Diagnosis Date    Bronchitis     Chronic back pain     md georgi [facility]    CKD (chronic kidney disease), stage III     High cholesterol     Hypertension     mary landaverde md    LBBB (left bundle branch block) 01/29/2012    Neurogenic dysfunction of the urinary bladder     Obesity     Rheumatoid arthritis ([facility] [address]     tiffanie campbell md rheum [facility]    S/P TKR (total knee replacement), right 2007    md yfn    Ulcer of right groin ([facility] [address]      Past Surgical History:   Procedure Laterality Date    BREAST SURGERY PROCEDURE UNLISTED      breast reduction    HC BLD ROTATABLE 4MM HR      HX BREAST REDUCTION Bilateral     2013    HX MOHS PROCEDURES      right    PLASTY KNEE,CONSTRAINED PROSTHESIS      AR CORRECT BUNION,SIMPLE       Allergies   Allergen Reactions    Pcn [Penicillins] Swelling         REVIEW OF SYSTEMS:  General: negative for - chills or fever  ENT: negative for - headaches, nasal congestion or tinnitus  Respiratory: negative for - cough, hemoptysis, shortness of breath or wheezing  Cardiovascular : negative for - chest pain, edema, palpitations or shortness of breath  Gastrointestinal: negative for - abdominal pain, blood in stools, heartburn or nausea/vomiting  Genito-Urinary: no dysuria, trouble voiding, or hematuria  Musculoskeletal: negative for - gait disturbance, joint pain, joint stiffness or joint swelling  Neurological: no TIA or stroke symptoms  Hematologic: no bruises, no bleeding, no swollen glands  Integument: no lumps, mole changes, nail changes or rash  Endocrine: no malaise/lethargy or unexpected weight changes      Social History     Socioeconomic History    Marital status:      Spouse name: Not on file    Number of children: Not on file    Years of education: Not on file    Highest education level: Not on file   Tobacco Use    Smoking status: Former Smoker     Last attempt to quit: 1993     Years since quittin.3    Smokeless tobacco: Never Used   Substance and Sexual Activity    Alcohol use: Yes     Comment: social    Drug use: No    Sexual activity: Yes     Partners: Male   Social History Narrative    Habits:  She discontinued cigarettes about 25 years ago. She has occasional beer or glass of wine. No drug abuse.         Social History:  The patient is . Her daughter lives with her, who also takes care of her. She has four children, two sons and two daughters, alive and well. 45year old daughter  of MI. She has 17 grandchildren and 6 great grandchildren. Completed her GED and some college. Restorationist preference is Holiness.         Family History:  Father  77 of seizure disorder, alcohol abuse. Mother  66 with dementia. One brother . Family History   Problem Relation Age of Onset    Dementia Mother     Alcohol abuse Father        OBJECTIVE:    Visit Vitals  BP (!) 154/96   Pulse 70   Temp 97.9 °F (36.6 °C) (Oral)   Resp 18   Ht 5' 4\" (1.626 m)   Wt 177 lb 14.4 oz (80.7 kg)   SpO2 94%   BMI 30.54 kg/m²     CONSTITUTIONAL: well , well nourished, appears age appropriate  EYES: perrla, eom intact  ENMT:moist mucous membranes, pharynx clear  NECK: supple.  Thyroid normal  RESPIRATORY: Chest: clear bilaterally   CARDIOVASCULAR: Heart: regular rate and rhythm  GASTROINTESTINAL: Abdomen: soft, bowel sounds active  HEMATOLOGIC: no pathological lymph nodes palpated  MUSCULOSKELETAL: Extremities: no edema, pulse 1+   INTEGUMENT: No unusual rashes or suspicious skin lesions noted. Nails appear normal.  NEUROLOGIC: non-focal exam   MENTAL STATUS: alert and oriented, appropriate affect           ASSESSMENT:  1. Essential hypertension    2. Stage 3a chronic kidney disease    3. High cholesterol    4. Chronic low back pain, unspecified back pain laterality, unspecified whether sciatica present    5. Rheumatoid arthritis, involving unspecified site, unspecified whether rheumatoid factor present ([facility] [address]      Repeat blood pressure is 124/88, which is acceptable. She states the blood pressure fluctuates according to her pain. She has a history of chronic kidney disease, which we will check, particularly in view of the fact that she is not on medication for rheumatoid arthritis. She also has a history of dyslipidemia, for which we will also evaluate. She is on no medications for her cholesterol. The chronic back pain is managed by pain management at [address] [place] [address]. The rheumatoid arthritis is also managed by [facility]. She will be back to see us in three to four months, sooner if needed. She states that she prefers that we do not check her blood today. She said they took it yesterday and will probably check it again tomorrow at [address] [place] [address]. We advise her of her chronic kidney disease. I have discussed the diagnosis with the patient and the intended plan as seen in the  Orders. The patient understands and agees with the plan. The patient has   received an after visit summary and questions were answered concerning  future plans  Patient labs and/or xrays were reviewed  Past records were reviewed. PLAN:  . No orders of the defined types were placed in this encounter.       Follow-up and Dispositions    · Return in about 4 months (around 2/21/2021). ATTENTION:   This medical record was transcribed using an electronic medical records system. Although proofread, it may and can contain electronic and spelling errors. Other human spelling and other errors may be present. Corrections may be executed at a later time. Please feel free to contact us for any clarifications as needed.

## 2020-10-21 NOTE — PROGRESS NOTES
1. Have you been to the ER, urgent care clinic since your last visit? Hospitalized since your last visit? No    2. Have you seen or consulted any other health care providers outside of the [address] [place] [address] since your last visit? Include any pap smears or colon screening.  No

## 2020-10-22 VITALS
OXYGEN SATURATION: 94 % | HEIGHT: 64 IN | WEIGHT: 177.9 LBS | RESPIRATION RATE: 18 BRPM | TEMPERATURE: 97.9 F | HEART RATE: 70 BPM | SYSTOLIC BLOOD PRESSURE: 124 MMHG | DIASTOLIC BLOOD PRESSURE: 88 MMHG | BODY MASS INDEX: 30.37 KG/M2

## 2021-01-27 ENCOUNTER — HOSPITAL ENCOUNTER (EMERGENCY)
Age: 71
Discharge: HOME | End: 2021-01-27
Attending: EMERGENCY MEDICINE | Admitting: EMERGENCY MEDICINE
Payer: MEDICARE

## 2021-01-27 ENCOUNTER — APPOINTMENT (OUTPATIENT)
Dept: GENERAL RADIOLOGY | Age: 71
End: 2021-01-27
Attending: EMERGENCY MEDICINE
Payer: MEDICARE

## 2021-01-27 ENCOUNTER — APPOINTMENT (OUTPATIENT)
Dept: CT IMAGING | Age: 71
End: 2021-01-27
Attending: EMERGENCY MEDICINE
Payer: MEDICARE

## 2021-01-27 VITALS
TEMPERATURE: 98.9 F | RESPIRATION RATE: 20 BRPM | DIASTOLIC BLOOD PRESSURE: 90 MMHG | SYSTOLIC BLOOD PRESSURE: 176 MMHG | HEART RATE: 87 BPM | OXYGEN SATURATION: 95 %

## 2021-01-27 DIAGNOSIS — R53.1 WEAKNESS: Primary | ICD-10-CM

## 2021-01-27 LAB
ALBUMIN SERPL-MCNC: 3 G/DL (ref 3.5–5)
ALBUMIN/GLOB SERPL: 0.8 {RATIO} (ref 1.1–2.2)
ALP SERPL-CCNC: 68 U/L (ref 45–117)
ALT SERPL-CCNC: 42 U/L (ref 12–78)
ANION GAP SERPL CALC-SCNC: 9 MMOL/L (ref 5–15)
APPEARANCE UR: CLEAR
AST SERPL-CCNC: 88 U/L (ref 15–37)
BACTERIA URNS QL MICRO: ABNORMAL /HPF
BASOPHILS # BLD: 0 K/UL (ref 0–0.1)
BASOPHILS NFR BLD: 0 % (ref 0–1)
BILIRUB SERPL-MCNC: 0.3 MG/DL (ref 0.2–1)
BILIRUB UR QL: NEGATIVE
BUN SERPL-MCNC: 28 MG/DL (ref 6–20)
BUN/CREAT SERPL: 15 (ref 12–20)
CALCIUM SERPL-MCNC: 8.8 MG/DL (ref 8.5–10.1)
CHLORIDE SERPL-SCNC: 105 MMOL/L (ref 97–108)
CO2 SERPL-SCNC: 26 MMOL/L (ref 21–32)
COLOR UR: ABNORMAL
CREAT SERPL-MCNC: 1.92 MG/DL (ref 0.55–1.02)
DIFFERENTIAL METHOD BLD: ABNORMAL
EOSINOPHIL # BLD: 0 K/UL (ref 0–0.4)
EOSINOPHIL NFR BLD: 0 % (ref 0–7)
EPITH CASTS URNS QL MICRO: ABNORMAL /LPF
ERYTHROCYTE [DISTWIDTH] IN BLOOD BY AUTOMATED COUNT: 12.3 % (ref 11.5–14.5)
GLOBULIN SER CALC-MCNC: 3.9 G/DL (ref 2–4)
GLUCOSE SERPL-MCNC: 96 MG/DL (ref 65–100)
GLUCOSE UR STRIP.AUTO-MCNC: NEGATIVE MG/DL
GRAN CASTS URNS QL MICRO: ABNORMAL /LPF
HCT VFR BLD AUTO: 34.1 % (ref 35–47)
HGB BLD-MCNC: 10.7 G/DL (ref 11.5–16)
HGB UR QL STRIP: NEGATIVE
HYALINE CASTS URNS QL MICRO: ABNORMAL /LPF (ref 0–5)
IMM GRANULOCYTES # BLD AUTO: 0 K/UL (ref 0–0.04)
IMM GRANULOCYTES NFR BLD AUTO: 0 % (ref 0–0.5)
KETONES UR QL STRIP.AUTO: NEGATIVE MG/DL
LEUKOCYTE ESTERASE UR QL STRIP.AUTO: NEGATIVE
LIPASE SERPL-CCNC: 49 U/L (ref 73–393)
LYMPHOCYTES # BLD: 1.1 K/UL (ref 0.8–3.5)
LYMPHOCYTES NFR BLD: 17 % (ref 12–49)
MAGNESIUM SERPL-MCNC: 2 MG/DL (ref 1.6–2.4)
MCH RBC QN AUTO: 30.6 PG (ref 26–34)
MCHC RBC AUTO-ENTMCNC: 31.4 G/DL (ref 30–36.5)
MCV RBC AUTO: 97.4 FL (ref 80–99)
MONOCYTES # BLD: 0.5 K/UL (ref 0–1)
MONOCYTES NFR BLD: 8 % (ref 5–13)
NEUTS SEG # BLD: 4.8 K/UL (ref 1.8–8)
NEUTS SEG NFR BLD: 75 % (ref 32–75)
NITRITE UR QL STRIP.AUTO: NEGATIVE
NRBC # BLD: 0 K/UL (ref 0–0.01)
NRBC BLD-RTO: 0 PER 100 WBC
PH UR STRIP: 6 [PH] (ref 5–8)
PLATELET # BLD AUTO: 87 K/UL (ref 150–400)
PMV BLD AUTO: 12.4 FL (ref 8.9–12.9)
POTASSIUM SERPL-SCNC: 4.7 MMOL/L (ref 3.5–5.1)
PROT SERPL-MCNC: 6.9 G/DL (ref 6.4–8.2)
PROT UR STRIP-MCNC: 30 MG/DL
RBC # BLD AUTO: 3.5 M/UL (ref 3.8–5.2)
RBC #/AREA URNS HPF: ABNORMAL /HPF (ref 0–5)
SARS-COV-2, COV2: NORMAL
SODIUM SERPL-SCNC: 140 MMOL/L (ref 136–145)
SP GR UR REFRACTOMETRY: 1.02 (ref 1–1.03)
TROPONIN I SERPL-MCNC: <0.05 NG/ML
TSH SERPL DL<=0.05 MIU/L-ACNC: 0.42 UIU/ML (ref 0.36–3.74)
UR CULT HOLD, URHOLD: NORMAL
UROBILINOGEN UR QL STRIP.AUTO: 0.2 EU/DL (ref 0.2–1)
WBC # BLD AUTO: 6.4 K/UL (ref 3.6–11)
WBC URNS QL MICRO: ABNORMAL /HPF (ref 0–4)

## 2021-01-27 PROCEDURE — 74011250636 HC RX REV CODE- 250/636: Performed by: EMERGENCY MEDICINE

## 2021-01-27 PROCEDURE — 93005 ELECTROCARDIOGRAM TRACING: CPT

## 2021-01-27 PROCEDURE — U0003 INFECTIOUS AGENT DETECTION BY NUCLEIC ACID (DNA OR RNA); SEVERE ACUTE RESPIRATORY SYNDROME CORONAVIRUS 2 (SARS-COV-2) (CORONAVIRUS DISEASE [COVID-19]), AMPLIFIED PROBE TECHNIQUE, MAKING USE OF HIGH THROUGHPUT TECHNOLOGIES AS DESCRIBED BY CMS-2020-01-R: HCPCS

## 2021-01-27 PROCEDURE — 96361 HYDRATE IV INFUSION ADD-ON: CPT

## 2021-01-27 PROCEDURE — 84443 ASSAY THYROID STIM HORMONE: CPT

## 2021-01-27 PROCEDURE — 36415 COLL VENOUS BLD VENIPUNCTURE: CPT

## 2021-01-27 PROCEDURE — 84484 ASSAY OF TROPONIN QUANT: CPT

## 2021-01-27 PROCEDURE — 71045 X-RAY EXAM CHEST 1 VIEW: CPT

## 2021-01-27 PROCEDURE — 70450 CT HEAD/BRAIN W/O DYE: CPT

## 2021-01-27 PROCEDURE — 99285 EMERGENCY DEPT VISIT HI MDM: CPT

## 2021-01-27 PROCEDURE — 85025 COMPLETE CBC W/AUTO DIFF WBC: CPT

## 2021-01-27 PROCEDURE — 96360 HYDRATION IV INFUSION INIT: CPT

## 2021-01-27 PROCEDURE — 81001 URINALYSIS AUTO W/SCOPE: CPT

## 2021-01-27 PROCEDURE — 83735 ASSAY OF MAGNESIUM: CPT

## 2021-01-27 PROCEDURE — 80053 COMPREHEN METABOLIC PANEL: CPT

## 2021-01-27 PROCEDURE — 83690 ASSAY OF LIPASE: CPT

## 2021-01-27 RX ORDER — HYDROXYCHLOROQUINE SULFATE 200 MG/1
400 TABLET, FILM COATED ORAL DAILY
COMMUNITY

## 2021-01-27 RX ORDER — PREGABALIN 50 MG/1
50 CAPSULE ORAL 3 TIMES DAILY
COMMUNITY

## 2021-01-27 RX ADMIN — SODIUM CHLORIDE 1000 ML: 9 INJECTION, SOLUTION INTRAVENOUS at 18:53

## 2021-01-27 NOTE — ED TRIAGE NOTES
Patient brought to room 6 via EMS from home. Patient c/o being\"weak since Sunday and has fallen a couple of times and had some diarrhea. \" Pain to left knee when walking with her walker.

## 2021-01-28 ENCOUNTER — PATIENT OUTREACH (OUTPATIENT)
Dept: CASE MANAGEMENT | Age: 71
End: 2021-01-28

## 2021-01-28 PROBLEM — U07.1 COVID-19 VIRUS DETECTED: Status: ACTIVE | Noted: 2021-01-27

## 2021-01-28 LAB
ATRIAL RATE: 74 BPM
CALCULATED R AXIS, ECG10: -36 DEGREES
CALCULATED T AXIS, ECG11: 154 DEGREES
DIAGNOSIS, 93000: NORMAL
P-R INTERVAL, ECG05: 140 MS
Q-T INTERVAL, ECG07: 422 MS
QRS DURATION, ECG06: 122 MS
QTC CALCULATION (BEZET), ECG08: 468 MS
SARS-COV-2, XPLCVT: DETECTED
SOURCE, COVRS: ABNORMAL
VENTRICULAR RATE, ECG03: 74 BPM

## 2021-01-28 NOTE — ED PROVIDER NOTES
The history is provided by the patient and a relative. Fatigue  This is a new problem. The current episode started 12 to 24 hours ago. The problem has not changed since onset. There was no focality noted. Pertinent negatives include no focal weakness, no speech difficulty, no visual change and no mental status change. Primary symptoms comment: loss of mobility. There has been no fever. Pertinent negatives include no vomiting. Fall  The accident occurred 12 to 24 hours ago. Fall occurred: while trying to get out of bed, having increased chronic pain and weakness. She fell from a height of 1 - 2 ft. The pain is moderate. She was not ambulatory at the scene. There was no entrapment after the fall. There was no drug use involved in the accident. There was no alcohol use involved in the accident. Pertinent negatives include no visual change, no fever, no abdominal pain, no vomiting, no loss of consciousness and no laceration. The risk factors include being elderly. Associated symptoms comments: Global weakness. Progressively worsening chronic back pain, sees pain management and rheumatology. She has tried nothing for the symptoms.         Past Medical History:   Diagnosis Date    Bronchitis     Chronic back pain     md georgi [facility]    CKD (chronic kidney disease), stage III     High cholesterol     Hypertension     mary landaverde md    LBBB (left bundle branch block) 01/29/2012    Neurogenic dysfunction of the urinary bladder     Obesity     Rheumatoid arthritis ([address]     tiffanie campbell md rheum -[facility]    S/P TKR (total knee replacement), right 2007    md yfn    Ulcer of right groin ([address]        Past Surgical History:   Procedure Laterality Date    HC BLD ROTATABLE 4MM HR      HX BREAST REDUCTION Bilateral     2013    HX MOHS PROCEDURES      right    MT BREAST SURGERY PROCEDURE UNLISTED      breast reduction    MT CORRECT BUNION,SIMPLE      MT PLASTY KNEE,CONSTRAINED PROSTHESIS Family History:   Problem Relation Age of Onset    Dementia Mother     Alcohol abuse Father        Social History     Socioeconomic History    Marital status:      Spouse name: Not on file    Number of children: Not on file    Years of education: Not on file    Highest education level: Not on file   Occupational History    Not on file   Social Needs    Financial resource strain: Not on file    Food insecurity     Worry: Not on file     Inability: Not on file    Transportation needs     Medical: Not on file     Non-medical: Not on file   Tobacco Use    Smoking status: Former Smoker     Quit date: 1993     Years since quittin.6    Smokeless tobacco: Never Used   Substance and Sexual Activity    Alcohol use: Yes     Comment: social    Drug use: No    Sexual activity: Yes     Partners: Male   Lifestyle    Physical activity     Days per week: Not on file     Minutes per session: Not on file    Stress: Not on file   Relationships    Social connections     Talks on phone: Not on file     Gets together: Not on file     Attends Zoroastrianism service: Not on file     Active member of club or organization: Not on file     Attends meetings of clubs or organizations: Not on file     Relationship status: Not on file    Intimate partner violence     Fear of current or ex partner: Not on file     Emotionally abused: Not on file     Physically abused: Not on file     Forced sexual activity: Not on file   Other Topics Concern    Not on file   Social History Narrative    Habits:  She discontinued cigarettes about 25 years ago. She has occasional beer or glass of wine. No drug abuse.         Social History:  The patient is . Her daughter lives with her, who also takes care of her. She has four children, two sons and two daughters, alive and well. 45year old daughter  of MI. She has 17 grandchildren and 6 great grandchildren. Completed her GED and some college.   Episcopalian preference is Worship.         Family History:  Father  77 of seizure disorder, alcohol abuse. Mother  66 with dementia. One brother . ALLERGIES: Pcn [penicillins]    Review of Systems   Constitutional: Positive for fatigue. Negative for fever. Gastrointestinal: Negative for abdominal pain and vomiting. Neurological: Negative for focal weakness, loss of consciousness and speech difficulty. All other systems reviewed and are negative. Vitals:    21 1625 21 1808   BP: 129/69 (!) 151/75   Pulse: 75 75   Resp: 15 14   Temp: 97.4 °F (36.3 °C)    SpO2: 96% 95%            Physical Exam  Vitals signs and nursing note reviewed. Constitutional:       General: She is not in acute distress. Appearance: She is well-developed. Comments: Frail, elderly, feels weak   HENT:      Head: Normocephalic and atraumatic. Eyes:      Conjunctiva/sclera: Conjunctivae normal.   Neck:      Musculoskeletal: Neck supple. Cardiovascular:      Rate and Rhythm: Normal rate and regular rhythm. Heart sounds: Normal heart sounds. Pulmonary:      Effort: Pulmonary effort is normal. No respiratory distress. Breath sounds: Normal breath sounds. Abdominal:      General: There is no distension. Musculoskeletal: Normal range of motion. General: No deformity. Skin:     General: Skin is warm and dry. Findings: No laceration. Neurological:      General: No focal deficit present. Mental Status: She is alert and oriented to person, place, and time. Cranial Nerves: No cranial nerve deficit. Motor: Motor function is intact. Coordination: Coordination is intact. Psychiatric:         Behavior: Behavior normal.          MDM     68-year-old female presents with progressive fatigue, some falling to the ground with weakness. She has been dealing with this for some time but has been worse over the last few days and today was immobile with family. They have attempted to take her to outpatient Occupational Therapy and physical therapy but have been unable. They were denied approval for home health physical therapy previously. Evaluation here shows no airspace disease, vital sign abnormalities, signs of infection, signs of metabolic disturbance or organ dysfunction. There is no indication to admit to the hospital inpatient unit currently on my evaluation. I discussed with family and they are amenable to arranging outpatient home health physical therapy to help the patient increase her strength. I think it would be beneficial for her to follow-up with her rheumatologist as an outpatient to monitor her chronic pain symptoms and help with mobility as well. Renal function is slightly decreased from previous but likely chronic in nature. IV fluids administered. Procedures    EKG 1751: Rate 74, normal sinus rhythm, left axis deviation, LBBB. No significant change from last tracing.

## 2021-01-28 NOTE — PROGRESS NOTES
I called and spoke with patient's son and daughter concerning results. Discussed self quarantine, questions answered, reviewed reasons/signs/symptoms for return to ER.

## 2021-01-28 NOTE — PROGRESS NOTES
Home health order received. Referral sent to [facility] who has accepted patient. Patient and daughter notified.   Marjan Caldwell LCSW

## 2021-01-29 ENCOUNTER — TELEPHONE (OUTPATIENT)
Dept: INTERNAL MEDICINE CLINIC | Age: 71
End: 2021-01-29

## 2021-01-29 NOTE — TELEPHONE ENCOUNTER
We have received another call from Duc Sample [id number] advised her that we spoke with her sister a few minutes ago. She advised us that her sister did not know what was going. She further told me that physical therapy has been out to see her mother and her oxygen saturation was 91%. Patient also has some shortness of breath. We suggest she should visit the emergency room. She further told me about the weaknesses that her sister had described earlier today.

## 2021-01-29 NOTE — TELEPHONE ENCOUNTER
Our staff received a call from [facility] requesting skilled services for the mother. Our staff called [facility] and fax over appropriate orders for skilled services. Our staff then received another call and because the nature of the call, I attempted to contact this daughter. I left messages for daughter and the mother as no one picked up. I then called Myra Franks [phone] her of our staff's conversation with her sister. She told me that her mother has become weaker and unable to move and maneuver independently in the home. We suggest that if her condition is declining that she should go to emergency room immediately. She states that she is going to see her mother currently. We further suggested that they decide not to take her to the emergency room that they obtain a pulse oximetry and monitor her oxygen saturation and condition. A decline or a saturation 93% or less would prompt a visit to the emergency room. She was  given the opportunity ask questions all of which were answered.

## 2021-02-08 ENCOUNTER — PATIENT OUTREACH (OUTPATIENT)
Dept: CASE MANAGEMENT | Age: 71
End: 2021-02-08

## 2021-02-08 NOTE — PROGRESS NOTES
Education Provided: ED 21:  Fall/Weakness f/u    Patient contacted regarding [id number] diagnosis\". Discussed COVID-19 related testing which was available at this time. Test results were positive. Patient informed of results, if available? yes     Care Transition Nurse/ Ambulatory Care Manager contacted the family by telephone to perform post discharge assessment. Call within 2 business days of discharge: Yes Verified name and  with family as identifiers. Provided introduction to self, and explanation of the CTN/ACM role, and reason for call due to risk factors for infection and/or exposure to COVID-19. Symptoms reviewed with family who verbalized the following symptoms: dizziness/lightheadedness      Due to no new or worsening symptoms encounter was not routed to provider for escalation. Had already spoke with PCP. Discussed follow-up appointments. If no appointment was previously scheduled, appointment scheduling offered:  yes   [facility] follow up appointment(s):   Future Appointments   Date Time Provider Sarah Hoffman   2021 11:30 AM Kenyon Phillips MD [facility] MAIN BS [facility]     Non-BS follow up appointment(s): no     Advance Care Planning:   Does patient have an Advance Directive:  patient declined education. Debbie Gayle remains as Healthcare Decision Maker. Patient has following risk factors of: weakness. CTN/ACM reviewed discharge instructions, medical action plan and red flags such as increased shortness of breath, increasing fever and signs of decompensation with family who verbalized understanding. Discussed exposure protocols and quarantine with CDC Guidelines What to do if you are sick with coronavirus disease 2019.  Family was given an opportunity for questions and concerns.  The family agrees to contact the [facility] exposure line [phone], [facility] department [address]  [phone] and PCP office for questions related to their healthcare. CTN/ACM provided contact information for future needs. Reviewed and educated family on any new and changed medications related to discharge diagnosis     Patient/family/caregiver given information for [facility] and agrees to enroll no      Plan for follow-up call in 5-7 days based on severity of symptoms and risk factors.

## 2021-02-08 NOTE — ACP (ADVANCE CARE PLANNING)
Advance Care Planning:   Does patient have an Advance Directive:  patient declined education. Agustina Solorzano remains as Healthcare Decision Maker.

## 2021-02-19 ENCOUNTER — TRANSCRIBE ORDER (OUTPATIENT)
Dept: INTERNAL MEDICINE CLINIC | Age: 71
End: 2021-02-19

## 2021-02-24 ENCOUNTER — PATIENT OUTREACH (OUTPATIENT)
Dept: CASE MANAGEMENT | Age: 71
End: 2021-02-24

## 2021-02-24 NOTE — PROGRESS NOTES
Education Provided: ED 1/27/21:  Fall/Weakness f/u    Patient on Complex CM Enrollment Report/fu Contact. Left message on voice mail with my contact information for return call. Need to assess for ongoing needs and CCM enrollment/fu.

## 2021-03-09 ENCOUNTER — OFFICE VISIT (OUTPATIENT)
Dept: INTERNAL MEDICINE CLINIC | Age: 71
End: 2021-03-09
Payer: MEDICARE

## 2021-03-09 VITALS
WEIGHT: 192.5 LBS | HEIGHT: 64 IN | SYSTOLIC BLOOD PRESSURE: 115 MMHG | BODY MASS INDEX: 32.87 KG/M2 | OXYGEN SATURATION: 95 % | DIASTOLIC BLOOD PRESSURE: 80 MMHG | TEMPERATURE: 97.5 F | RESPIRATION RATE: 18 BRPM | HEART RATE: 75 BPM

## 2021-03-09 DIAGNOSIS — I87.2 VENOUS INSUFFICIENCY OF BOTH LOWER EXTREMITIES: ICD-10-CM

## 2021-03-09 DIAGNOSIS — U07.1 COVID-19 VIRUS DETECTED: ICD-10-CM

## 2021-03-09 DIAGNOSIS — E78.00 HIGH CHOLESTEROL: ICD-10-CM

## 2021-03-09 DIAGNOSIS — Z00.00 MEDICARE ANNUAL WELLNESS VISIT, SUBSEQUENT: Primary | ICD-10-CM

## 2021-03-09 DIAGNOSIS — N31.9 NEUROGENIC DYSFUNCTION OF THE URINARY BLADDER: ICD-10-CM

## 2021-03-09 DIAGNOSIS — M06.9 RHEUMATOID ARTHRITIS, INVOLVING UNSPECIFIED SITE, UNSPECIFIED WHETHER RHEUMATOID FACTOR PRESENT (HCC): ICD-10-CM

## 2021-03-09 DIAGNOSIS — I10 ESSENTIAL HYPERTENSION: ICD-10-CM

## 2021-03-09 DIAGNOSIS — M54.50 CHRONIC LOW BACK PAIN, UNSPECIFIED BACK PAIN LATERALITY, UNSPECIFIED WHETHER SCIATICA PRESENT: ICD-10-CM

## 2021-03-09 DIAGNOSIS — N18.32 STAGE 3B CHRONIC KIDNEY DISEASE (HCC): ICD-10-CM

## 2021-03-09 DIAGNOSIS — G89.29 CHRONIC LOW BACK PAIN, UNSPECIFIED BACK PAIN LATERALITY, UNSPECIFIED WHETHER SCIATICA PRESENT: ICD-10-CM

## 2021-03-09 DIAGNOSIS — Z13.31 SCREENING FOR DEPRESSION: ICD-10-CM

## 2021-03-09 PROCEDURE — G8536 NO DOC ELDER MAL SCRN: HCPCS | Performed by: INTERNAL MEDICINE

## 2021-03-09 PROCEDURE — G8400 PT W/DXA NO RESULTS DOC: HCPCS | Performed by: INTERNAL MEDICINE

## 2021-03-09 PROCEDURE — 99213 OFFICE O/P EST LOW 20 MIN: CPT | Performed by: INTERNAL MEDICINE

## 2021-03-09 PROCEDURE — G8752 SYS BP LESS 140: HCPCS | Performed by: INTERNAL MEDICINE

## 2021-03-09 PROCEDURE — 3017F COLORECTAL CA SCREEN DOC REV: CPT | Performed by: INTERNAL MEDICINE

## 2021-03-09 PROCEDURE — G8427 DOCREV CUR MEDS BY ELIG CLIN: HCPCS | Performed by: INTERNAL MEDICINE

## 2021-03-09 PROCEDURE — 1101F PT FALLS ASSESS-DOCD LE1/YR: CPT | Performed by: INTERNAL MEDICINE

## 2021-03-09 PROCEDURE — G8417 CALC BMI ABV UP PARAM F/U: HCPCS | Performed by: INTERNAL MEDICINE

## 2021-03-09 PROCEDURE — G0439 PPPS, SUBSEQ VISIT: HCPCS | Performed by: INTERNAL MEDICINE

## 2021-03-09 PROCEDURE — G8432 DEP SCR NOT DOC, RNG: HCPCS | Performed by: INTERNAL MEDICINE

## 2021-03-09 PROCEDURE — G9899 SCRN MAM PERF RSLTS DOC: HCPCS | Performed by: INTERNAL MEDICINE

## 2021-03-09 PROCEDURE — G8754 DIAS BP LESS 90: HCPCS | Performed by: INTERNAL MEDICINE

## 2021-03-09 PROCEDURE — 1090F PRES/ABSN URINE INCON ASSESS: CPT | Performed by: INTERNAL MEDICINE

## 2021-03-09 NOTE — PROGRESS NOTES
SPORTS MEDICINE AND PRIMARY CARE  Marquise Mcclain MD, [address] [address]  Phone:  [phone]  Fax: [phone]       Chief Complaint   Patient presents with   [facility] Wellness Visit   . SUBJECTIVE:    Judi Aragon is a 65 y.o. female Since we last saw her, she developed COVID-19 virus infection in January with a history of rheumatoid arthritis, followed by rheumatology at [facility], CKD stage 3, followed by Attila Elaine MD, primary hypertension, dyslipidemia, chronic back pain, followed by pain management, and is seen for evaluation. She comes in today with her granddaughter, Brandon Gonzalez, [phone]. Patient complains of her legs swelling bilaterally. When she elevates her legs the swelling does not go down. The fluid pills do not seem to help it. She complains of trouble holding her bladder, stopped Oxybutynin, but when she was taking it, it was not that helpful and she would like to see a urologist.      Since the COVID infection she is having more difficulty with ambulation and has physical therapy coming out to the house. Dr. Sherri Batista called her today and is going to place her on a blood thinner because of apparently paroxysmal atrial fibrillation. Patient is seen for evaluation. Current Outpatient Medications   Medication Sig Dispense Refill    pregabalin (Lyrica) 50 mg capsule Take 50 mg by mouth three (3) times daily.  FOLIC ACID PO Take 2 Tabs by mouth daily.  hydrOXYchloroQUINE (Plaquenil) 200 mg tablet Take 400 mg by mouth daily.  Allergy 25 mg capsule TAKE 2 CAPSULES BY MOUTH EVERY 12 HOURS AS NEEDED 120 Cap 3    fluticasone propion-salmeteroL (Advair Diskus) 250-50 mcg/dose diskus inhaler Take 1 Puff by inhalation every twelve (12) hours. 1 Each 11    fluticasone propionate (Flonase) 50 mcg/actuation nasal spray 2 Sprays by Both Nostrils route daily.  1 Bottle 11    diclofenac (VOLTAREN) 1 % gel Apply  to affected area four (4) times daily. 1 Each 11    atorvastatin (LIPITOR) 40 mg tablet Take 1 Tab by mouth nightly. 30 Tab 11    carvediloL (COREG) 12.5 mg tablet TAKE 1 TABLET BY MOUTH TWICE A DAY WITH MEALS 180 Tab 1    potassium chloride (KLOR-CON) 20 mEq pack TAKE 1 PACKET DAILY BY MOUTH 90 Packet 1    amLODIPine (NORVASC) 5 mg tablet Take 1 Tab by mouth daily. 30 Tab 11    leflunomide (ARAVA) 20 mg tablet Take 1 Tab by mouth daily. 30 Tab 1    bacitracin-neomycin-polymyxin b-hydrocortisone (CORTISPORIN) 1 % ointment Apply  to affected area two (2) times a day. 15 g 11    furosemide (LASIX) 40 mg tablet Take 40 mg by mouth daily.        Past Medical History:   Diagnosis Date    AF (paroxysmal atrial fibrillation) ([facility])     md reno    Bronchitis     Chronic back pain     md georgi [facility]    CKD (chronic kidney disease), stage III 12/23/2019    selene solano md    COVID-19 virus detected 01/27/2021    High cholesterol     Hypertension     mary landaverde md    LBBB (left bundle branch block) 01/29/2012    Neurogenic dysfunction of the urinary bladder     Obesity     Rheumatoid arthritis ([facility] [address]     tiffanie campbell md rheum -vcu    S/P TKR (total knee replacement), right 2007    md yfn    Ulcer of right groin ([facility] [address]      Past Surgical History:   Procedure Laterality Date    HC BLD ROTATABLE 4MM HR      HX BREAST REDUCTION Bilateral     2013    HX MOHS PROCEDURES      right    IA BREAST SURGERY PROCEDURE UNLISTED      breast reduction    IA CORRECT BUNION,SIMPLE      IA PLASTY KNEE,CONSTRAINED PROSTHESIS       Allergies   Allergen Reactions    Pcn [Penicillins] Swelling         REVIEW OF SYSTEMS:  General: negative for - chills or fever  ENT: negative for - headaches, nasal congestion or tinnitus  Respiratory: negative for - cough, hemoptysis, shortness of breath or wheezing  Cardiovascular : negative for - chest pain, edema, palpitations or shortness of breath  Gastrointestinal: negative for - abdominal pain, blood in stools, heartburn or nausea/vomiting  Genito-Urinary: no dysuria, trouble voiding, or hematuria  Musculoskeletal: negative for - gait disturbance, joint pain, joint stiffness or joint swelling  Neurological: no TIA or stroke symptoms  Hematologic: no bruises, no bleeding, no swollen glands  Integument: no lumps, mole changes, nail changes or rash  Endocrine: no malaise/lethargy or unexpected weight changes      Social History     Socioeconomic History    Marital status:      Spouse name: Not on file    Number of children: Not on file    Years of education: Not on file    Highest education level: Not on file   Tobacco Use    Smoking status: Former Smoker     Quit date: 1993     Years since quittin.7    Smokeless tobacco: Never Used   Substance and Sexual Activity    Alcohol use: Yes     Frequency: Monthly or less     Drinks per session: 1 or 2     Binge frequency: Never     Comment: social    Drug use: No    Sexual activity: Yes     Partners: Male   Social History Narrative    Habits:  She discontinued cigarettes about 25 years ago. She has occasional beer or glass of wine. No drug abuse.         Social History:  The patient is . Her daughter lives with her, who also takes care of her. She has four children, two sons and two daughters, alive and well. (Yudelka Samson) 45year old daughter  of [place]. She has 17 grandchildren and 6 great grandchildren. Completed her GED and some college. Taoist preference is Holiness.         Family History:  Father  77 of seizure disorder, alcohol abuse. Mother  66 with dementia. One brother .      Family History   Problem Relation Age of Onset    Dementia Mother     Alcohol abuse Father        OBJECTIVE:    Visit Vitals  /80   Pulse 75   Temp 97.5 °F (36.4 °C) (Oral)   Resp 18   Ht 5' 4\" (1.626 m)   Wt 192 lb 8 oz (87.3 kg)   SpO2 95%   BMI 33.04 kg/m² CONSTITUTIONAL: well , well nourished, appears age appropriate  EYES: perrla, eom intact  ENMT:moist mucous membranes, pharynx clear  NECK: supple. Thyroid normal  RESPIRATORY: Chest: clear bilaterally   CARDIOVASCULAR: Heart: regular rate and rhythm  GASTROINTESTINAL: Abdomen: soft, bowel sounds active  HEMATOLOGIC: no pathological lymph nodes palpated  MUSCULOSKELETAL: Extremities: no edema, pulse 1+   INTEGUMENT: No unusual rashes or suspicious skin lesions noted. Nails appear normal.  NEUROLOGIC: non-focal exam   MENTAL STATUS: alert and oriented, appropriate affect           ASSESSMENT:  1. Medicare annual wellness visit, subsequent    2. Screening for depression    3. COVID-19 virus detected    4. Rheumatoid arthritis, involving unspecified site, unspecified whether rheumatoid factor present ([facility] [address]    5. Stage 3b chronic kidney disease    6. Essential hypertension    7. High cholesterol    8. Chronic low back pain, unspecified back pain laterality, unspecified whether sciatica present    9. Neurogenic dysfunction of the urinary bladder    10. Venous insufficiency of both lower extremities      She survived the COVID virus infection with only a problem with balance, she states. Rheumatoid arthritis is followed by rheumatology at [facility] and she is on Leflunomide. She does not complain of arthritic pains today. CKD stage 3 is followed by Dr. An Valles and we will send him a copy of lab results. Blood pressure control is at goal.      We will check her lipid panel today. She is taking Atorvastatin, but does not take it regularly and she admits to that. We encourage compliance. Chronic low back pain is followed by pain management and she is currently on Lyrica. Fortunately, she got off the Percocet. She complains of urinary leakage and is no longer taking Ditropan, which was not that helpful she states. We refer her to urology.   She will be back to see me in four to six months, sooner if needed. I have discussed the diagnosis with the patient and the intended plan as seen in the  Orders. The patient understands and agees with the plan. The patient has   received an after visit summary and questions were answered concerning  future plans  Patient labs and/or xrays were reviewed  Past records were reviewed. PLAN:  .  Orders Placed This Encounter    ANNUAL DEPRESSION SCREEN 8-15 MIN    AMB SUPPLY ORDER    JOAQU[facility]N MAMMO BI SCREENING INCL CAD    URINALYSIS W/ RFLX MICROSCOPIC    CBC WITH AUTOMATED DIFF    METABOLIC PANEL, COMPREHENSIVE    LIPID PANEL    TSH 3RD GENERATION    HEMOGLOBIN A1C WITH EAG    REFERRAL TO UROLOGY    REFERRAL TO PSYCHIATRY       Follow-up and Dispositions    · Return in about 4 months (around 7/9/2021). ATTENTION:   This medical record was transcribed using an electronic medical records system. Although proofread, it may and can contain electronic and spelling errors. Other human spelling and other errors may be present. Corrections may be executed at a later time. Please feel free to contact us for any clarifications as needed.

## 2021-03-09 NOTE — PATIENT INSTRUCTIONS
Medicare Wellness Visit, Female The best way to live healthy is to have a lifestyle where you eat a well-balanced diet, exercise regularly, limit alcohol use, and quit all forms of tobacco/nicotine, if applicable. Regular preventive services are another way to keep healthy. Preventive services (vaccines, screening tests, monitoring & exams) can help personalize your care plan, which helps you manage your own care. Screening tests can find health problems at the earliest stages, when they are easiest to treat. [facility] follows the current, evidence-based guidelines published by the [facility] (RUSTSTF) when recommending preventive services for our patients. Because we follow these guidelines, sometimes recommendations change over time as research supports it. (For example, mammograms used to be recommended annually. Even though Medicare will still pay for an annual mammogram, the newer guidelines recommend a mammogram every two years for women of average risk). Of course, you and your doctor may decide to screen more often for some diseases, based on your risk and your co-morbidities (chronic disease you are already diagnosed with). Preventive services for you include: - Medicare offers their members a free annual wellness visit, which is time for you and your primary care provider to discuss and plan for your preventive service needs. Take advantage of this benefit every year! 
-All adults over the age of 72 should receive the recommended pneumonia vaccines. Current USPSTF guidelines recommend a series of two vaccines for the best pneumonia protection.  
-All adults should have a flu vaccine yearly and a tetanus vaccine every 10 years.  
-All adults age 48 and older should receive the shingles vaccines (series of two vaccines). -All adults age 38-68 who are overweight should have a diabetes screening test once every three years. -All adults born between [birth year] and 1965 should be screened once for Hepatitis C. 
-Other screening tests and preventive services for persons with diabetes include: an eye exam to screen for diabetic retinopathy, a kidney function test, a foot exam, and stricter control over your cholesterol.  
-Cardiovascular screening for adults with routine risk involves an electrocardiogram (ECG) at intervals determined by your doctor.  
-Colorectal cancer screenings should be done for adults age 54-65 with no increased risk factors for colorectal cancer. There are a number of acceptable methods of screening for this type of cancer. Each test has its own benefits and drawbacks. Discuss with your doctor what is most appropriate for you during your annual wellness visit. The different tests include: colonoscopy (considered the best screening method), a fecal occult blood test, a fecal DNA test, and sigmoidoscopy. 
 
-A bone mass density test is recommended when a woman turns 65 to screen for osteoporosis. This test is only recommended one time, as a screening. Some providers will use this same test as a disease monitoring tool if you already have osteoporosis. -Breast cancer screenings are recommended every other year for women of normal risk, age 54-69. 
-Cervical cancer screenings for women over age 72 are only recommended with certain risk factors. Here is a list of your current Health Maintenance items (your personalized list of preventive services) with a due date: 
Health Maintenance Due Topic Date Due  
 COVID-19 Vaccine (1 of 2) Never done  DTaP/Tdap/Td  (1 - Tdap) Never done  Shingles Vaccine (1 of 2) Never done  Colorectal Screening  Never done  Glaucoma Screening   Never done  Bone Mineral Density   Never done  Pneumococcal Vaccine (1 of 1 - PPSV23) Never done  Cholesterol Test   12/23/2020

## 2021-03-09 NOTE — PROGRESS NOTES
1. Have you been to the ER, urgent care clinic since your last visit? Hospitalized since your last visit? No    2. Have you seen or consulted any other health care providers outside of the [address] [place] [address] since your last visit? Include any pap smears or colon screening. No  This is the Subsequent Medicare Annual Wellness Exam, performed 12 months or more after the Initial AWV or the last Subsequent AWV    I have reviewed the patient's medical history in detail and updated the computerized patient record. Depression Risk Factor Screening:     3 most recent PHQ Screens 3/9/2021   Little interest or pleasure in doing things Not at all   Feeling down, depressed, irritable, or hopeless -   Total Score PHQ 2 -       Alcohol Risk Screen    Do you average more than 1 drink per night or more than 7 drinks a week:  No    On any one occasion in the past three months have you have had more than 3 drinks containing alcohol:  No        Functional Ability and Level of Safety:    Hearing: Hearing is good. Activities of Daily Living: The home contains: handrails and grab bars  Patient needs help with:  dressing, bathing and hygiene      Ambulation: with difficulty, uses a cane     Fall Risk:  Fall Risk Assessment, last 12 mths 3/9/2021   Able to walk? Yes   Fall in past 12 months? 0   Do you feel unsteady?  0   Are you worried about falling 0      Abuse Screen:  Patient is not abused       Cognitive Screening    Has your family/caregiver stated any concerns about your memory: no     Cognitive Screening: not necessary    Assessment/Plan   Education and counseling provided:  Are appropriate based on today's review and evaluation        Health Maintenance Due     Health Maintenance Due   Topic Date Due    COVID-19 Vaccine (1 of 2) Never done    DTaP/Tdap/Td series (1 - Tdap) Never done    Shingrix Vaccine Age 50> (1 of 2) Never done    Colorectal Cancer Screening Combo  Never done    GLAUCOMA SCREENING Q2Y Never done    Bone Densitometry (Dexa) Screening  Never done    Pneumococcal 65+ years (1 of 1 - PPSV23) Never done    Medicare Yearly Exam  12/23/2020    Lipid Screen  12/23/2020       Patient Care Team   Patient Care Team:  Trey Chatman MD as PCP - General (Internal Medicine)  Trey Chatman MD as PCP - [facility] Provider  Ponce Mcmahon, RN as Ambulatory Care Manager (General Practice)    History     Patient Active Problem List   Diagnosis Code    CKD (chronic kidney disease), stage III N18.30    Obesity E66.9    Hypertension I10    High cholesterol E78.00    Chronic back pain M54.9, G89.29    Bronchitis J40    Arthritis M19.90    Rheumatoid arthritis ([facility] [address] M06.9    LBBB (left bundle branch block) I44.7    Neurogenic dysfunction of the urinary bladder N31.9    COVID-19 virus detected U07.1     Past Medical History:   Diagnosis Date    Bronchitis     Chronic back pain     md georgi [facility]    CKD (chronic kidney disease), stage III     selene solano md    COVID-19 virus detected 01/27/2021    High cholesterol     Hypertension     mary landaverde md    LBBB (left bundle branch block) 01/29/2012    Neurogenic dysfunction of the urinary bladder     Obesity     Rheumatoid arthritis ([facility] [address]     tiffanie campbell md rheum -vcu    S/P TKR (total knee replacement), right 2007    md yfn    Ulcer of right groin ([facility] [address]       Past Surgical History:   Procedure Laterality Date    HC BLD ROTATABLE 4MM HR      HX BREAST REDUCTION Bilateral     2013    HX MOHS PROCEDURES      right    [place] BREAST SURGERY PROCEDURE UNLISTED      breast reduction    WV CORRECT BUNION,SIMPLE      [place] PLASTY KNEE,CONSTRAINED PROSTHESIS       Current Outpatient Medications   Medication Sig Dispense Refill    pregabalin (Lyrica) 50 mg capsule Take 50 mg by mouth three (3) times daily.  FOLIC ACID PO Take 2 Tabs by mouth daily.       hydrOXYchloroQUINE (Plaquenil) 200 mg tablet Take 400 mg by mouth daily.  Allergy 25 mg capsule TAKE 2 CAPSULES BY MOUTH EVERY 12 HOURS AS NEEDED 120 Cap 3    fluticasone propion-salmeteroL (Advair Diskus) 250-50 mcg/dose diskus inhaler Take 1 Puff by inhalation every twelve (12) hours. 1 Each 11    fluticasone propionate (Flonase) 50 mcg/actuation nasal spray 2 Sprays by Both Nostrils route daily. 1 Bottle 11    diclofenac (VOLTAREN) 1 % gel Apply  to affected area four (4) times daily. 1 Each 11    atorvastatin (LIPITOR) 40 mg tablet Take 1 Tab by mouth nightly. 30 Tab 11    carvediloL (COREG) 12.5 mg tablet TAKE 1 TABLET BY MOUTH TWICE A DAY WITH MEALS 180 Tab 1    potassium chloride (KLOR-CON) 20 mEq pack TAKE 1 PACKET DAILY BY MOUTH 90 Packet 1    amLODIPine (NORVASC) 5 mg tablet Take 1 Tab by mouth daily. 30 Tab 11    leflunomide (ARAVA) 20 mg tablet Take 1 Tab by mouth daily. 30 Tab 1    bacitracin-neomycin-polymyxin b-hydrocortisone (CORTISPORIN) 1 % ointment Apply  to affected area two (2) times a day. 15 g 11    furosemide (LASIX) 40 mg tablet Take 40 mg by mouth daily.  RAYALDEE 30 mcg Cs24 TAKE 1 CAPSULE BY MOUTH EVERY DAY      cyclobenzaprine (FLEXERIL) 10 mg tablet Take 1 Tab by mouth three (3) times daily as needed for Muscle Spasm(s). 10 Tab 0    oxyCODONE-acetaminophen (PERCOCET) 5-325 mg per tablet Take 1 Tab by mouth every four (4) hours as needed for Pain. Max Daily Amount: 6 Tabs.  20 Tab 0     Allergies   Allergen Reactions    Pcn [Penicillins] Swelling       Family History   Problem Relation Age of Onset    Dementia Mother     Alcohol abuse Father      Social History     Tobacco Use    Smoking status: Former Smoker     Quit date: 1993     Years since quittin.7    Smokeless tobacco: Never Used   Substance Use Topics    Alcohol use: Yes     Frequency: Monthly or less     Drinks per session: 1 or 2     Binge frequency: Never     Comment: social

## 2021-03-10 LAB
ALBUMIN SERPL-MCNC: 3.1 G/DL (ref 3.5–5)
ALBUMIN/GLOB SERPL: 1 {RATIO} (ref 1.1–2.2)
ALP SERPL-CCNC: 89 U/L (ref 45–117)
ALT SERPL-CCNC: 13 U/L (ref 12–78)
ANION GAP SERPL CALC-SCNC: 5 MMOL/L (ref 5–15)
AST SERPL-CCNC: 17 U/L (ref 15–37)
BASOPHILS # BLD: 0.1 K/UL (ref 0–0.1)
BASOPHILS NFR BLD: 1 % (ref 0–1)
BILIRUB SERPL-MCNC: 0.3 MG/DL (ref 0.2–1)
BUN SERPL-MCNC: 30 MG/DL (ref 6–20)
BUN/CREAT SERPL: 23 (ref 12–20)
CALCIUM SERPL-MCNC: 9.1 MG/DL (ref 8.5–10.1)
CHLORIDE SERPL-SCNC: 110 MMOL/L (ref 97–108)
CHOLEST SERPL-MCNC: 152 MG/DL
CO2 SERPL-SCNC: 28 MMOL/L (ref 21–32)
CREAT SERPL-MCNC: 1.31 MG/DL (ref 0.55–1.02)
DIFFERENTIAL METHOD BLD: ABNORMAL
EOSINOPHIL # BLD: 0.2 K/UL (ref 0–0.4)
EOSINOPHIL NFR BLD: 3 % (ref 0–7)
ERYTHROCYTE [DISTWIDTH] IN BLOOD BY AUTOMATED COUNT: 16.4 % (ref 11.5–14.5)
EST. AVERAGE GLUCOSE BLD GHB EST-MCNC: 97 MG/DL
GLOBULIN SER CALC-MCNC: 3.2 G/DL (ref 2–4)
GLUCOSE SERPL-MCNC: 72 MG/DL (ref 65–100)
HBA1C MFR BLD: 5 % (ref 4–5.6)
HCT VFR BLD AUTO: 28.6 % (ref 35–47)
HDLC SERPL-MCNC: 50 MG/DL
HDLC SERPL: 3 {RATIO} (ref 0–5)
HGB BLD-MCNC: 8.6 G/DL (ref 11.5–16)
IMM GRANULOCYTES # BLD AUTO: 0 K/UL
IMM GRANULOCYTES NFR BLD AUTO: 0 %
LDLC SERPL CALC-MCNC: 84 MG/DL (ref 0–100)
LIPID PROFILE,FLP: NORMAL
LYMPHOCYTES # BLD: 2.1 K/UL (ref 0.8–3.5)
LYMPHOCYTES NFR BLD: 31 % (ref 12–49)
MCH RBC QN AUTO: 30.9 PG (ref 26–34)
MCHC RBC AUTO-ENTMCNC: 30.1 G/DL (ref 30–36.5)
MCV RBC AUTO: 102.9 FL (ref 80–99)
MONOCYTES # BLD: 0.6 K/UL (ref 0–1)
MONOCYTES NFR BLD: 9 % (ref 5–13)
NEUTS SEG # BLD: 3.7 K/UL (ref 1.8–8)
NEUTS SEG NFR BLD: 56 % (ref 32–75)
NRBC # BLD: 0 K/UL (ref 0–0.01)
NRBC BLD-RTO: 0 PER 100 WBC
PLATELET # BLD AUTO: 157 K/UL (ref 150–400)
PMV BLD AUTO: 12.8 FL (ref 8.9–12.9)
POTASSIUM SERPL-SCNC: 4.6 MMOL/L (ref 3.5–5.1)
PROT SERPL-MCNC: 6.3 G/DL (ref 6.4–8.2)
RBC # BLD AUTO: 2.78 M/UL (ref 3.8–5.2)
RBC MORPH BLD: ABNORMAL
RBC MORPH BLD: ABNORMAL
SODIUM SERPL-SCNC: 143 MMOL/L (ref 136–145)
TRIGL SERPL-MCNC: 90 MG/DL (ref ?–150)
TSH SERPL DL<=0.05 MIU/L-ACNC: 0.73 UIU/ML (ref 0.36–3.74)
VLDLC SERPL CALC-MCNC: 18 MG/DL
WBC # BLD AUTO: 6.7 K/UL (ref 3.6–11)

## 2021-03-24 LAB
FERRITIN SERPL-MCNC: 163 NG/ML (ref 26–388)
IRON SERPL-MCNC: 53 UG/DL (ref 35–150)

## 2021-05-03 ENCOUNTER — OFFICE VISIT (OUTPATIENT)
Dept: INTERNAL MEDICINE CLINIC | Age: 71
End: 2021-05-03
Payer: MEDICARE

## 2021-05-03 VITALS
HEART RATE: 70 BPM | OXYGEN SATURATION: 98 % | DIASTOLIC BLOOD PRESSURE: 93 MMHG | RESPIRATION RATE: 16 BRPM | BODY MASS INDEX: 33.04 KG/M2 | TEMPERATURE: 97.8 F | HEIGHT: 64 IN | SYSTOLIC BLOOD PRESSURE: 155 MMHG

## 2021-05-03 DIAGNOSIS — N31.9 NEUROGENIC DYSFUNCTION OF THE URINARY BLADDER: ICD-10-CM

## 2021-05-03 DIAGNOSIS — E66.09 CLASS 1 OBESITY DUE TO EXCESS CALORIES WITHOUT SERIOUS COMORBIDITY WITH BODY MASS INDEX (BMI) OF 33.0 TO 33.9 IN ADULT: ICD-10-CM

## 2021-05-03 DIAGNOSIS — N18.32 STAGE 3B CHRONIC KIDNEY DISEASE (HCC): ICD-10-CM

## 2021-05-03 DIAGNOSIS — I48.0 AF (PAROXYSMAL ATRIAL FIBRILLATION) (HCC): ICD-10-CM

## 2021-05-03 DIAGNOSIS — M06.9 RHEUMATOID ARTHRITIS, INVOLVING UNSPECIFIED SITE, UNSPECIFIED WHETHER RHEUMATOID FACTOR PRESENT (HCC): Primary | ICD-10-CM

## 2021-05-03 DIAGNOSIS — U07.1 COVID-19 VIRUS DETECTED: ICD-10-CM

## 2021-05-03 DIAGNOSIS — E78.00 HIGH CHOLESTEROL: ICD-10-CM

## 2021-05-03 DIAGNOSIS — I10 ESSENTIAL HYPERTENSION: ICD-10-CM

## 2021-05-03 DIAGNOSIS — R26.9 GAIT DISORDER: ICD-10-CM

## 2021-05-03 PROCEDURE — G8400 PT W/DXA NO RESULTS DOC: HCPCS | Performed by: INTERNAL MEDICINE

## 2021-05-03 PROCEDURE — 1101F PT FALLS ASSESS-DOCD LE1/YR: CPT | Performed by: INTERNAL MEDICINE

## 2021-05-03 PROCEDURE — G8536 NO DOC ELDER MAL SCRN: HCPCS | Performed by: INTERNAL MEDICINE

## 2021-05-03 PROCEDURE — 1090F PRES/ABSN URINE INCON ASSESS: CPT | Performed by: INTERNAL MEDICINE

## 2021-05-03 PROCEDURE — G8753 SYS BP > OR = 140: HCPCS | Performed by: INTERNAL MEDICINE

## 2021-05-03 PROCEDURE — G9899 SCRN MAM PERF RSLTS DOC: HCPCS | Performed by: INTERNAL MEDICINE

## 2021-05-03 PROCEDURE — 99214 OFFICE O/P EST MOD 30 MIN: CPT | Performed by: INTERNAL MEDICINE

## 2021-05-03 PROCEDURE — G8755 DIAS BP > OR = 90: HCPCS | Performed by: INTERNAL MEDICINE

## 2021-05-03 PROCEDURE — G8427 DOCREV CUR MEDS BY ELIG CLIN: HCPCS | Performed by: INTERNAL MEDICINE

## 2021-05-03 PROCEDURE — G8510 SCR DEP NEG, NO PLAN REQD: HCPCS | Performed by: INTERNAL MEDICINE

## 2021-05-03 PROCEDURE — G8417 CALC BMI ABV UP PARAM F/U: HCPCS | Performed by: INTERNAL MEDICINE

## 2021-05-03 PROCEDURE — 3017F COLORECTAL CA SCREEN DOC REV: CPT | Performed by: INTERNAL MEDICINE

## 2021-05-03 RX ORDER — FUROSEMIDE 40 MG/1
TABLET ORAL
Qty: 90 TAB | Refills: 3 | Status: SHIPPED | OUTPATIENT
Start: 2021-05-03 | End: 2021-06-23 | Stop reason: DRUGHIGH

## 2021-05-03 NOTE — PROGRESS NOTES
1. Have you been to the ER, urgent care clinic since your last visit? Hospitalized since your last visit? No    2. Have you seen or consulted any other health care providers outside of the [address] [place] [address] since your last visit? Include any pap smears or colon screening.  No    Wants to discuss leg and foot swelling

## 2021-05-03 NOTE — PROGRESS NOTES
SPORTS MEDICINE AND PRIMARY CARE  Bisi Grande MD, [address] [address]  Phone:  [phone]  Fax: [phone]       Chief Complaint   Patient presents with    Swelling   . SUBJECTIVE:    Josh Dong is a 66 y.o. female Patient returns today with a known history of rheumatoid arthritis, paroxysmal atrial fibrillation, COVID-19 in January, neurogenic bladder, chronic kidney disease, followed by Terrance Jaquez, primary hypertension, dyslipidemia, obesity, and is seen for evaluation. Since we last saw her, she has been getting physical therapy from [facility] and wants to see a neurologist to find out why she cannot walk. Her legs feel weak. Dr. Lynn Murphy wanted a referral to neurology also to see if this is from [facility] affecting her nervous system in some way and she now has a gait disorder. She comes in with her first granddaughter, whose name is Ramarkishna Post at [phone]. She is also concerned about the swelling in her legs. She just saw Dr. Jacki Zavala, who suggested she go back to the previous dose of Lasix at 20 mg twice a day. She saw Dr. Lynn Murphy on the 22nd, who did not make any changes in her medication. She is seen for evaluation. Current Outpatient Medications   Medication Sig Dispense Refill    pregabalin (Lyrica) 50 mg capsule Take 50 mg by mouth three (3) times daily.  FOLIC ACID PO Take 2 Tabs by mouth daily.  hydrOXYchloroQUINE (Plaquenil) 200 mg tablet Take 400 mg by mouth daily.  Allergy 25 mg capsule TAKE 2 CAPSULES BY MOUTH EVERY 12 HOURS AS NEEDED 120 Cap 3    fluticasone propion-salmeteroL (Advair Diskus) 250-50 mcg/dose diskus inhaler Take 1 Puff by inhalation every twelve (12) hours. 1 Each 11    fluticasone propionate (Flonase) 50 mcg/actuation nasal spray 2 Sprays by Both Nostrils route daily. 1 Bottle 11    diclofenac (VOLTAREN) 1 % gel Apply  to affected area four (4) times daily.  1 Each 11    atorvastatin (LIPITOR) 40 mg tablet Take 1 Tab by mouth nightly. 30 Tab 11    carvediloL (COREG) 12.5 mg tablet TAKE 1 TABLET BY MOUTH TWICE A DAY WITH MEALS 180 Tab 1    potassium chloride (KLOR-CON) 20 mEq pack TAKE 1 PACKET DAILY BY MOUTH 90 Packet 1    amLODIPine (NORVASC) 5 mg tablet Take 1 Tab by mouth daily. 30 Tab 11    leflunomide (ARAVA) 20 mg tablet Take 1 Tab by mouth daily. 30 Tab 1    bacitracin-neomycin-polymyxin b-hydrocortisone (CORTISPORIN) 1 % ointment Apply  to affected area two (2) times a day. 15 g 11    furosemide (LASIX) 40 mg tablet Take 40 mg by mouth daily.        Past Medical History:   Diagnosis Date    AF [facility]oxysmal [facility] fibrillation[facility])     md reno    Bronchitis     Chronic back pain     md [facility]    CKD (chronic kidney disease), stage III ([facility] 12/23/2019    selene solano md    COVID-19 virus detected 01/27/2021    High cholesterol     Hypertension     mary landaverde md    LBBB (left bundle branch block) 01/29/2012    Neurogenic dysfunction of the urinary bladder     Obesity     Rheumatoid arthritis ([facility]     tiffanie campbell md rheum -vcu    S/P TKR (total knee replacement), right 2007    md yfn    Ulcer of right groin ([facility]      Past Surgical History:   Procedure Laterality Date    HC BLD ROTATABLE 4MM HR      HX BREAST REDUCTION Bilateral     2013    HX MOHS PROCEDURES      right    NH BREAST SURGERY PROCEDURE UNLISTED      breast reduction    NH CORRECT BUNION,SIMPLE      NH PLASTY KNEE,CONSTRAINED PROSTHESIS       Allergies   Allergen Reactions    Pcn [Penicillins] Swelling         REVIEW OF SYSTEMS:  General: negative for - chills or fever  ENT: negative for - headaches, nasal congestion or tinnitus  Respiratory: negative for - cough, hemoptysis, shortness of breath or wheezing  Cardiovascular : negative for - chest pain, edema, palpitations or shortness of breath  Gastrointestinal: negative for - abdominal pain, blood in stools, heartburn or nausea/vomiting  Genito-Urinary: no dysuria, trouble voiding, or hematuria  Musculoskeletal: negative for - gait disturbance, joint pain, joint stiffness or joint swelling  Neurological: no TIA or stroke symptoms  Hematologic: no bruises, no bleeding, no swollen glands  Integument: no lumps, mole changes, nail changes or rash  Endocrine: no malaise/lethargy or unexpected weight changes      Social History     Socioeconomic History    Marital status:      Spouse name: Not on file    Number of children: Not on file    Years of education: Not on file    Highest education level: Not on file   Tobacco Use    Smoking status: Former Smoker     Quit date: 1993     Years since quittin.8    Smokeless tobacco: Never Used   Substance and Sexual Activity    Alcohol use: Yes     Frequency: Monthly or less     Drinks per session: 1 or 2     Binge frequency: Never     Comment: social    Drug use: No    Sexual activity: Yes     Partners: Male   Social History Narrative    Habits:  She discontinued cigarettes about 25 years ago. She has occasional beer or glass of wine. No drug abuse.         Social History:  The patient is . Her daughter lives with her, who also takes care of her. She has four children, two sons and two daughters, alive and well. (Brielle Bauer) 45year old daughter  of MI. She has 17 grandchildren and 6 great grandchildren. Completed her GED and some college. [facility] preference is [facility].     [phone]        Family History:  Father  77 of seizure disorder, alcohol abuse. Mother  66 with dementia. One brother .      Family History   Problem Relation Age of Onset    Dementia Mother     Alcohol abuse Father        OBJECTIVE:    Visit Vitals  BP (!) 155/93   Pulse 70   Temp 97.8 °F (36.6 °C) (Oral)   Resp 16   Ht 5' 4\" (1.626 m)   SpO2 98%   BMI 33.04 kg/m²     CONSTITUTIONAL: well , well nourished, appears age appropriate  EYES: perrla, eom intact  ENMT:moist mucous membranes, pharynx clear  NECK: supple. Thyroid normal  RESPIRATORY: Chest: clear bilaterally   CARDIOVASCULAR: Heart: regular rate and rhythm  GASTROINTESTINAL: Abdomen: soft, bowel sounds active  HEMATOLOGIC: no pathological lymph nodes palpated  MUSCULOSKELETAL: Extremities: no edema, pulse 1+   INTEGUMENT: No unusual rashes or suspicious skin lesions noted. Nails appear normal.  NEUROLOGIC: non-focal exam   MENTAL STATUS: alert and oriented, appropriate affect           ASSESSMENT:  1. Rheumatoid arthritis, involving unspecified site, unspecified whether rheumatoid factor present ([facility]    2. AF (paroxysmal atrial fibrillation) ([facility]    3. COVID-19 virus detected    4. Neurogenic dysfunction of the urinary bladder    5. Stage 3b chronic kidney disease ([facility]    6. Essential hypertension    7. High cholesterol    8. Class 1 obesity due to excess calories without serious comorbidity with body mass index (BMI) of 33.0 to 33.9 in adult    9. Gait disorder      Patient remains under the care of Dr. Cassandra Calles for rheumatoid arthritis, which is relatively quiescent now. There is a history of paroxysmal atrial fibrillation, for which she is at least on auscultation remains in sinus rhythm. She follows with Dr. Marcela Wilson regarding her atrial fibrillation. She had COVID-19 in January and she had trouble with her gait ever since the virus infection. She would like a neurologic opinion. The chronic kidney disease is followed on a regular basis by Dr. Yuriy Chen. She just saw him two weeks ago, therefore we will not check it again today. Blood pressure is a little higher than I would like to see it. Repeat blood pressure is 134/86. At home it runs in the 217-884 range systolic. Therefore, no titration of her medication is needed. She is on Atorvastatin for the dyslipidemia.       Obesity remains an issue and we encourage a heart healthy, weight reducing diet.    The main issue today, however, is her gait disorder, for which we will refer to neurology for opinion. She has peripheral edema, which is compatible with venous insufficiency. She will be back to see us in two to three months, sooner if needed. I have discussed the diagnosis with the patient and the intended plan as seen in the  Orders. The patient understands and agees with the plan. The patient has   received an after visit summary and questions were answered concerning  future plans  Patient labs and/or xrays were reviewed  Past records were reviewed. PLAN:  .  Orders Placed This Encounter    REFERRAL TO [address] Neurology ref [facility]       Follow-up and Dispositions    · Return in about 3 months (around 8/3/2021). Follow-up and Disposition History                 ATTENTION:   This medical record was transcribed using an electronic medical records system. Although proofread, it may and can contain electronic and spelling errors. Other human spelling and other errors may be present. Corrections may be executed at a later time. Please feel free to contact us for any clarifications as needed.

## 2021-05-06 ENCOUNTER — OFFICE VISIT (OUTPATIENT)
Dept: NEUROLOGY | Age: 71
End: 2021-05-06
Payer: MEDICARE

## 2021-05-06 VITALS
BODY MASS INDEX: 32.96 KG/M2 | HEART RATE: 77 BPM | WEIGHT: 192 LBS | DIASTOLIC BLOOD PRESSURE: 93 MMHG | SYSTOLIC BLOOD PRESSURE: 173 MMHG

## 2021-05-06 DIAGNOSIS — R26.9 GAIT ABNORMALITY: ICD-10-CM

## 2021-05-06 DIAGNOSIS — G20 PARKINSONISM, UNSPECIFIED PARKINSONISM TYPE (HCC): ICD-10-CM

## 2021-05-06 DIAGNOSIS — R25.1 TREMOR: Primary | ICD-10-CM

## 2021-05-06 PROCEDURE — G8432 DEP SCR NOT DOC, RNG: HCPCS | Performed by: PSYCHIATRY & NEUROLOGY

## 2021-05-06 PROCEDURE — G9899 SCRN MAM PERF RSLTS DOC: HCPCS | Performed by: PSYCHIATRY & NEUROLOGY

## 2021-05-06 PROCEDURE — G8427 DOCREV CUR MEDS BY ELIG CLIN: HCPCS | Performed by: PSYCHIATRY & NEUROLOGY

## 2021-05-06 PROCEDURE — 1101F PT FALLS ASSESS-DOCD LE1/YR: CPT | Performed by: PSYCHIATRY & NEUROLOGY

## 2021-05-06 PROCEDURE — G8400 PT W/DXA NO RESULTS DOC: HCPCS | Performed by: PSYCHIATRY & NEUROLOGY

## 2021-05-06 PROCEDURE — G8753 SYS BP > OR = 140: HCPCS | Performed by: PSYCHIATRY & NEUROLOGY

## 2021-05-06 PROCEDURE — G8417 CALC BMI ABV UP PARAM F/U: HCPCS | Performed by: PSYCHIATRY & NEUROLOGY

## 2021-05-06 PROCEDURE — 3017F COLORECTAL CA SCREEN DOC REV: CPT | Performed by: PSYCHIATRY & NEUROLOGY

## 2021-05-06 PROCEDURE — G8755 DIAS BP > OR = 90: HCPCS | Performed by: PSYCHIATRY & NEUROLOGY

## 2021-05-06 PROCEDURE — 99204 OFFICE O/P NEW MOD 45 MIN: CPT | Performed by: PSYCHIATRY & NEUROLOGY

## 2021-05-06 PROCEDURE — G8536 NO DOC ELDER MAL SCRN: HCPCS | Performed by: PSYCHIATRY & NEUROLOGY

## 2021-05-06 PROCEDURE — 1090F PRES/ABSN URINE INCON ASSESS: CPT | Performed by: PSYCHIATRY & NEUROLOGY

## 2021-05-06 RX ORDER — CARBIDOPA AND LEVODOPA 25; 100 MG/1; MG/1
TABLET ORAL
Qty: 90 TAB | Refills: 3 | Status: SHIPPED | OUTPATIENT
Start: 2021-05-06 | End: 2021-06-17

## 2021-05-06 NOTE — PROGRESS NOTES
[facility] and [address] at [facility] Neurology Clinics at [address] [facility], [address] [place] [id number] [address] [facility], [address] [place] [address]  [phone] Office  [phone] Facsimile           Referring: Marcelo Madera MD      Chief Complaint   Patient presents with    New Patient    Gait Problem     imbalance since having COVID in Jan or Feb 211     66year-old lady presents today for initial neurologic consultation regarding difficulty with gait and balance. Her granddaughter accompanies her today and helps provide history. This lady tells me that she has been having progressive gait disorder since she was diagnosed with Covid at the end of January beginning of February. She says that she previously walk with a 4 pronged cane but then after Covid she has a trouble with swelling in her lower extremities and weakness. Notes that she has progressed to having to use a walker. Her granddaughter says that now her feet seem magnetic. The glue to the floor. When she goes downstairs she does not  her feet. Her granddaughter says sometimes she actually has to physically pick the patient's feet up to move them. Patient also has tremor in her right upper extremity. This has been ongoing for some time now but getting worse. Its of rest tremor. Does not happen with movement. No family history of tremor. Patient says that she believes that the gait disorder secondary to having swelling in the legs. She has tried diuretics and that is not helped. She is seen her kidney doctor who tells her her kidneys are doing well. She saw her heart doctor who did an echocardiogram and EKG and told her that her heart was fine. She is worried that Covid has affected her nervous system. No focal weakness. No loss of consciousness.   She says she does not fall but she says that when she feels like her legs are giving out she will just slide into the floor. Her granddaughter says that she sometimes will just freeze and stop and that is when her legs seem to give out. Office visit with Dr. Brittany Conway reviewed from May 3, 2021 where patient came in noting that she was having difficulty walking. She is noted to have had Covid in January. Has history of neurogenic bladder and chronic renal disease. Has hypertension dyslipidemia and obesity as well as rheumatoid arthritis and paroxysmal A. fib. She notes that she was getting physical therapy from [facility]. She wanted to know if Covid was affecting her nervous system now that she has a gait disorder. She was having swelling in her legs. Dr. Mena Valenzuela had recommended she go back to Lasix 20 mg twice daily. She was by examination with edema in the lower extremities. She was with regular rate and rhythm on exam.  She was to continue to follow with Dr. Gely Reyes for her atrial fibrillation. She was referred to neurology as she felt that Covid had affected her gait. She was to continue to follow with nephrology for her renal disease. Her blood pressure was higher than Dr. Brittany Conway would like to see and that was being monitored. She continued her statin. It was felt that her peripheral edema was compatible with her venous insufficiency and she was to follow-up in 2-3 months. She was referred to physical therapy and neurology. Recent labs from March  Ferritin normal  Iron normal  Folate normal  B12 normal  Reticulocyte normal CBC hemoglobin 9.6 with   Creatinine 1.3  Lipid panel with LDL 84  TSH normal  Hemoglobin A1c 5  Past Medical History:   Diagnosis Date    AF (paroxysmal [facility] fibrillation) ([facility])     md reno    Bronchitis     Chronic back pain     md [facility]    CKD (chronic kidney disease), stage III ([facility] [address] [facility] 12/23/2019    selene solano md    COVID-19 virus detected 01/27/2021    High cholesterol     Hypertension     s. md akhil    LBBB (left bundle branch block) 01/29/2012    Neurogenic dysfunction of the urinary bladder     Obesity     Rheumatoid arthritis ([facility] [address]     tiffanie campbell md rheum -vcu    S/P TKR (total knee replacement), right 2007    md yfn    Ulcer of right groin ([facility]        Past Surgical History:   Procedure Laterality Date    HC BLD ROTATABLE 4MM HR      HX BREAST REDUCTION Bilateral     2013    HX MOHS PROCEDURES      right    VT BREAST SURGERY PROCEDURE UNLISTED      breast reduction    VT CORRECT BUNION,SIMPLE      VT PLASTY KNEE,CONSTRAINED PROSTHESIS         Current Outpatient Medications   Medication Sig Dispense Refill    furosemide (LASIX) 40 mg tablet TAKE 1 TABLET BY MOUTH EVERY DAY 90 Tab 3    pregabalin (Lyrica) 50 mg capsule Take 50 mg by mouth three (3) times daily.  FOLIC ACID PO Take 2 Tabs by mouth daily.  hydrOXYchloroQUINE (Plaquenil) 200 mg tablet Take 400 mg by mouth daily.  Allergy 25 mg capsule TAKE 2 CAPSULES BY MOUTH EVERY 12 HOURS AS NEEDED 120 Cap 3    fluticasone propion-salmeteroL (Advair Diskus) 250-50 mcg/dose diskus inhaler Take 1 Puff by inhalation every twelve (12) hours. 1 Each 11    fluticasone propionate (Flonase) 50 mcg/actuation nasal spray 2 Sprays by Both Nostrils route daily. 1 Bottle 11    diclofenac (VOLTAREN) 1 % gel Apply  to affected area four (4) times daily. 1 Each 11    carvediloL (COREG) 12.5 mg tablet TAKE 1 TABLET BY MOUTH TWICE A DAY WITH MEALS 180 Tab 1    potassium chloride (KLOR-CON) 20 mEq pack TAKE 1 PACKET DAILY BY MOUTH 90 Packet 1    amLODIPine (NORVASC) 5 mg tablet Take 1 Tab by mouth daily. 30 Tab 11    leflunomide (ARAVA) 20 mg tablet Take 1 Tab by mouth daily. 30 Tab 1    atorvastatin (LIPITOR) 40 mg tablet Take 1 Tab by mouth nightly. 30 Tab 11    bacitracin-neomycin-polymyxin b-hydrocortisone (CORTISPORIN) 1 % ointment Apply  to affected area two (2) times a day.  15 g 11   She says she is no longer on the Lipitor although Dr. Gibson Sender clearly noted he wanted her to continue that on her visit from just 3 days ago. Allergies   Allergen Reactions    Pcn [Penicillins] Swelling       Social History     Tobacco Use    Smoking status: Former Smoker     Quit date: 1993     Years since quittin.8    Smokeless tobacco: Never Used   Substance Use Topics    Alcohol use: Yes     Frequency: Monthly or less     Drinks per session: 1 or 2     Binge frequency: Never     Comment: social    Drug use: No       Family History   Problem Relation Age of Onset    Dementia Mother     Alcohol abuse Father        Examination  Visit Vitals  BP (!) 173/93 (BP 1 Location: Left upper arm, BP Patient Position: Sitting, BP Cuff Size: Large adult)   Pulse 77   Wt 87.1 kg (192 lb)   BMI 32.96 kg/m²     She is a pleasant elderly lady. She has no icterus. Oropharynx clear moist.  She has edema lower extremities. She is awake alert oriented and conversant. She has normal speech and language except she is hypophonic. Lashell Wasserman She discusses her medical history and current events. She follows all commands. Intact cranial nerves II-XII. Her face is masked. She is hypophonic. She tends to keep her head down but she is able to extend the neck. She has rest tremor about the right hand. She has a significant amount of rigidity at the right wrist.  She has cogwheeling at the left wrist.  No tremor at the left wrist.  No postural tremor but she does have postural reemergent tremor of the right hand. She is bradykinetic. She has difficulty arising from a seated position. She is hunched in her posture. She has a magnetic gait. She takes 6 steps to turn. She is imbalance. Reflexes symmetrical.  No ataxia.     Impression/Plan  This 77-year-old lady has parkinsonism  We will get an EEG CT of the head carotid Doppler to exclude any other etiology  Start Sinemet in a customary fashion using 25/100 strength 1/2 tablet 8 AM, 12 noon 4 PM x1 week then a full tablet the same interval thereafter discussed potential side effects  Follow-up in 6 weeks    Vasquez Zamarripa MD          This note was created using voice recognition software. Despite editing, there may be syntax errors.

## 2021-05-06 NOTE — PATIENT INSTRUCTIONS
RESULT POLICY If we have ordered testing for you, know that; \"NO NEWS IS GOOD NEWS! \" It is our policy that we no longer call patients with results, nor do we  give test results over the phone. We schedule follow up appointments so that your results can be discussed in person. This allows you to address any questions you have regarding the results. If you choose to go to an imaging center outside of [facility], it is your responsibility to bring imaging report and disc to follow up appointment. If something of concern is revealed on your test, we will contact you to discuss the matter and if needed schedule a sooner follow up appointment. Additionally, results may be found by using the My Chart feature and one of our patient service representatives at the  can give you instructions on how to access this feature to utilize our electronic medical record system. Thank you for your understanding. PRESCRIPTION REFILL POLICY [facility] Statement to Patients April 1, 2014 In an effort to ensure the large volume of patient prescription refills is processed in the most efficient and expeditious manner, we are asking our patients to assist us by calling your Pharmacy for all prescription refills, this will include also your  Mail Order Pharmacy. The pharmacy will contact our office electronically to continue the refill process. Please do not wait until the last minute to call your pharmacy. We need at least 48 hours (2days) to fill prescriptions. We also encourage you to call your pharmacy before going to  your prescription to make sure it is ready. With regard to controlled substance prescription refill requests (narcotic refills) that need to be picked up at our office, we ask your cooperation by providing us with at least 72 hours (3days) notice that you will need a refill.  
 
We will not refill narcotic prescription refill requests after 4:00pm on any weekday, Monday through Thursday, or after 2:00pm on Fridays, or on the weekends. We encourage everyone to explore another way of getting your prescription refill request processed using [employer], our patient web portal through our electronic medical record system. [employer] is an efficient and effective way to communicate your medication request directly to the office and  downloadable as an indy on your smart phone . [employer] also features a review functionality that allows you to view your medication list as well as leave messages for your physician. Are you ready to get connected? If so please review the attatched instructions or speak to any of our staff to get you set up right away! Thank you so much for your cooperation. Should you have any questions please contact our Practice Administrator.

## 2021-05-10 ENCOUNTER — HOSPITAL ENCOUNTER (OUTPATIENT)
Dept: CT IMAGING | Age: 71
Discharge: HOME | End: 2021-05-10
Attending: PSYCHIATRY & NEUROLOGY
Payer: MEDICARE

## 2021-05-10 DIAGNOSIS — R25.1 TREMOR: ICD-10-CM

## 2021-05-10 DIAGNOSIS — G20 PARKINSONISM, UNSPECIFIED PARKINSONISM TYPE (HCC): ICD-10-CM

## 2021-05-10 DIAGNOSIS — R26.9 GAIT ABNORMALITY: ICD-10-CM

## 2021-05-10 PROCEDURE — 70450 CT HEAD/BRAIN W/O DYE: CPT

## 2021-05-21 ENCOUNTER — DOCUMENTATION ONLY (OUTPATIENT)
Dept: NEUROLOGY | Age: 71
End: 2021-05-21

## 2021-05-21 ENCOUNTER — OFFICE VISIT (OUTPATIENT)
Dept: NEUROLOGY | Age: 71
End: 2021-05-21

## 2021-05-21 DIAGNOSIS — R25.9 ABNORMAL INVOLUNTARY MOVEMENT: Primary | ICD-10-CM

## 2021-05-21 NOTE — PROGRESS NOTES
[facility] AT [place]   Carotid Doppler Report    Date of Service: 5/21/21  Referring: Dr. Guille Scott    B-mode imaging reveals mixed plaque at the bifurcation extending into the proximal internal carotid artery segments bilaterally. Doppler spectral analysis significant velocity shifts. Vertebral artery flow antegrade bilaterally.     Interpretation  16-49% bilateral ICA    Rise MD Renetta

## 2021-05-24 NOTE — PROCEDURES
EEG:      Date:  05/21/21    Requesting Physician:  Tanner Simms. MD Veena    An EEG is requested in this 66year-old to evaluate for epileptiform abnormality. Medications:  Medications are said to include Sinemet, Lyrica, Voltaren, Lasix, Plaquenil, folic acid, Lipitor, Coreg. This tracing is obtained during the awake state. During wakefulness there are brief intermittent runs of posteriorly-dominant and symmetric low-to-medium amplitude 8 cycle per second activities which attenuate with eye opening. Lower-voltage faster-frequency activities are seen symmetrically over the anterior head regions. Hyperventilation is not performed secondary to COVID-19 precautions. Intermittent photic stimulation little alters the tracing. Sleep is not attained. Interpretation:   This EEG recorded during the awake state is normal.

## 2021-05-28 RX ORDER — DIPHENHYDRAMINE HYDROCHLORIDE 25 MG/1
CAPSULE ORAL
Qty: 120 CAPSULE | Refills: 5 | Status: SHIPPED | OUTPATIENT
Start: 2021-05-28 | End: 2021-06-22

## 2021-06-17 ENCOUNTER — OFFICE VISIT (OUTPATIENT)
Dept: NEUROLOGY | Age: 71
End: 2021-06-17
Payer: MEDICARE

## 2021-06-17 VITALS
SYSTOLIC BLOOD PRESSURE: 185 MMHG | OXYGEN SATURATION: 96 % | DIASTOLIC BLOOD PRESSURE: 96 MMHG | HEART RATE: 72 BPM | WEIGHT: 192 LBS | BODY MASS INDEX: 32.96 KG/M2 | RESPIRATION RATE: 16 BRPM

## 2021-06-17 DIAGNOSIS — H15.89 INJECTION OF SURFACE OF LEFT EYE: ICD-10-CM

## 2021-06-17 DIAGNOSIS — R60.0 LEG EDEMA: ICD-10-CM

## 2021-06-17 DIAGNOSIS — G20 PARKINSONISM, UNSPECIFIED PARKINSONISM TYPE (HCC): Primary | ICD-10-CM

## 2021-06-17 PROCEDURE — 99214 OFFICE O/P EST MOD 30 MIN: CPT | Performed by: PSYCHIATRY & NEUROLOGY

## 2021-06-17 PROCEDURE — G8753 SYS BP > OR = 140: HCPCS | Performed by: PSYCHIATRY & NEUROLOGY

## 2021-06-17 PROCEDURE — G8400 PT W/DXA NO RESULTS DOC: HCPCS | Performed by: PSYCHIATRY & NEUROLOGY

## 2021-06-17 PROCEDURE — G8432 DEP SCR NOT DOC, RNG: HCPCS | Performed by: PSYCHIATRY & NEUROLOGY

## 2021-06-17 PROCEDURE — 1090F PRES/ABSN URINE INCON ASSESS: CPT | Performed by: PSYCHIATRY & NEUROLOGY

## 2021-06-17 PROCEDURE — G8417 CALC BMI ABV UP PARAM F/U: HCPCS | Performed by: PSYCHIATRY & NEUROLOGY

## 2021-06-17 PROCEDURE — 3017F COLORECTAL CA SCREEN DOC REV: CPT | Performed by: PSYCHIATRY & NEUROLOGY

## 2021-06-17 PROCEDURE — G8427 DOCREV CUR MEDS BY ELIG CLIN: HCPCS | Performed by: PSYCHIATRY & NEUROLOGY

## 2021-06-17 PROCEDURE — G8536 NO DOC ELDER MAL SCRN: HCPCS | Performed by: PSYCHIATRY & NEUROLOGY

## 2021-06-17 PROCEDURE — G9899 SCRN MAM PERF RSLTS DOC: HCPCS | Performed by: PSYCHIATRY & NEUROLOGY

## 2021-06-17 PROCEDURE — G8754 DIAS BP LESS 90: HCPCS | Performed by: PSYCHIATRY & NEUROLOGY

## 2021-06-17 PROCEDURE — 1101F PT FALLS ASSESS-DOCD LE1/YR: CPT | Performed by: PSYCHIATRY & NEUROLOGY

## 2021-06-17 RX ORDER — CARBIDOPA AND LEVODOPA 25; 100 MG/1; MG/1
TABLET ORAL
Qty: 135 TABLET | Refills: 3 | Status: SHIPPED | OUTPATIENT
Start: 2021-06-17 | End: 2021-09-15

## 2021-06-17 NOTE — PROGRESS NOTES
[facility] and [address] at [facility] at [address] [facility], [address] [facility] [address] [facility], [address] [place] [address]   [phone]              Chief Complaint   Patient presents with    Parkinsons Disease    Results     dop, eeg,      Current Outpatient Medications   Medication Sig Dispense Refill    Allergy 25 mg capsule TAKE 2 CAPSULE BY MOUTH TWICE A DAY FOR 10 DAYS 120 Capsule 5    carbidopa-levodopa (Sinemet)  mg per tablet 1/2 tablet at 8am, 12noon and 4pm x 1week then 1tablet at same times thereafter 90 Tab 3    furosemide (LASIX) 40 mg tablet TAKE 1 TABLET BY MOUTH EVERY DAY 90 Tab 3    pregabalin (Lyrica) 50 mg capsule Take 50 mg by mouth three (3) times daily.  FOLIC ACID PO Take 2 Tabs by mouth daily.  hydrOXYchloroQUINE (Plaquenil) 200 mg tablet Take 400 mg by mouth daily.  fluticasone propion-salmeteroL (Advair Diskus) 250-50 mcg/dose diskus inhaler Take 1 Puff by inhalation every twelve (12) hours. 1 Each 11    fluticasone propionate (Flonase) 50 mcg/actuation nasal spray 2 Sprays by Both Nostrils route daily. 1 Bottle 11    diclofenac (VOLTAREN) 1 % gel Apply  to affected area four (4) times daily. 1 Each 11    atorvastatin (LIPITOR) 40 mg tablet Take 1 Tab by mouth nightly. 30 Tab 11    carvediloL (COREG) 12.5 mg tablet TAKE 1 TABLET BY MOUTH TWICE A DAY WITH MEALS 180 Tab 1    potassium chloride (KLOR-CON) 20 mEq pack TAKE 1 PACKET DAILY BY MOUTH 90 Packet 1    amLODIPine (NORVASC) 5 mg tablet Take 1 Tab by mouth daily. 30 Tab 11    leflunomide (ARAVA) 20 mg tablet Take 1 Tab by mouth daily. 30 Tab 1    bacitracin-neomycin-polymyxin b-hydrocortisone (CORTISPORIN) 1 % ointment Apply  to affected area two (2) times a day.  15 g 11      Allergies   Allergen Reactions    Pcn [Penicillins] Swelling     Social History     Tobacco Use    Smoking status: Former Smoker     Quit date: 1993     Years since quittin.0    Smokeless tobacco: Never Used   Vaping Use    Vaping Use: Never used   Substance Use Topics    Alcohol use: Yes     Comment: social    Drug use: No     49-year-old lady returns today for follow-up. Her last visit was her initial consultation with difficulty with gait and balance. She appeared parkinsonian. We went ahead and sent her for several tests to exclude other etiologies. Carotid Doppler with insignificant stenosis  EEG unremarkable  CT of the head unremarkable  Today she reports provement with the Sinemet. Moving better. Getting out of bed better. Walking better. Tolerating medicine without difficulty. Increasing edema of her lower extremities. A bit frustrated in that regard    Examination  Visit Vitals  BP (!) 183/89 (BP 1 Location: Left upper arm, BP Patient Position: Sitting, BP Cuff Size: Large adult)   Pulse 72   Resp 16   Wt 87.1 kg (192 lb)   SpO2 96%   BMI 32.96 kg/m²     She is awake alert and oriented. Normal speech and language. Mild rest tremor right hand. Mild cogwheeling right wrist.  She arises from the chair and ambulates with a fairly good stride. Has some arm swing. Takes a 3-1/2 step turn compared to a 6 last time. She has injection of the left sclera. Marked edema of the lower extremities. Impression/Plan  Parkinson's improved with Sinemet  Continue to escalate up to a dose of 1.5 tablets 3 times daily at same interval  Several questions today regarding Parkinson's, staging, medications, worsening edema etc. deferred the edema to primary care/renal/cardiology and they note that those evaluations and medication trials have been unremarkable  Injection of the left sclera likely just a broken vessel but advised her to see ophthalmology    Follow with me in 2 months      Rik Giraldo MD      This note was created using voice recognition software. Despite editing, there may be syntax errors.

## 2021-06-22 ENCOUNTER — OFFICE VISIT (OUTPATIENT)
Dept: INTERNAL MEDICINE CLINIC | Age: 71
End: 2021-06-22
Payer: MEDICARE

## 2021-06-22 VITALS
HEART RATE: 71 BPM | DIASTOLIC BLOOD PRESSURE: 102 MMHG | OXYGEN SATURATION: 97 % | WEIGHT: 206.6 LBS | TEMPERATURE: 97.7 F | BODY MASS INDEX: 35.27 KG/M2 | RESPIRATION RATE: 18 BRPM | SYSTOLIC BLOOD PRESSURE: 168 MMHG | HEIGHT: 64 IN

## 2021-06-22 DIAGNOSIS — I48.0 AF (PAROXYSMAL ATRIAL FIBRILLATION) (HCC): ICD-10-CM

## 2021-06-22 DIAGNOSIS — I10 ESSENTIAL HYPERTENSION: ICD-10-CM

## 2021-06-22 DIAGNOSIS — E78.00 HIGH CHOLESTEROL: ICD-10-CM

## 2021-06-22 DIAGNOSIS — E66.09 CLASS 1 OBESITY DUE TO EXCESS CALORIES WITHOUT SERIOUS COMORBIDITY WITH BODY MASS INDEX (BMI) OF 33.0 TO 33.9 IN ADULT: ICD-10-CM

## 2021-06-22 DIAGNOSIS — R26.9 GAIT DISORDER: ICD-10-CM

## 2021-06-22 DIAGNOSIS — N18.32 STAGE 3B CHRONIC KIDNEY DISEASE (HCC): ICD-10-CM

## 2021-06-22 DIAGNOSIS — R60.0 LOCALIZED EDEMA: Primary | ICD-10-CM

## 2021-06-22 PROCEDURE — G9899 SCRN MAM PERF RSLTS DOC: HCPCS | Performed by: INTERNAL MEDICINE

## 2021-06-22 PROCEDURE — G8427 DOCREV CUR MEDS BY ELIG CLIN: HCPCS | Performed by: INTERNAL MEDICINE

## 2021-06-22 PROCEDURE — G8510 SCR DEP NEG, NO PLAN REQD: HCPCS | Performed by: INTERNAL MEDICINE

## 2021-06-22 PROCEDURE — 1090F PRES/ABSN URINE INCON ASSESS: CPT | Performed by: INTERNAL MEDICINE

## 2021-06-22 PROCEDURE — 3017F COLORECTAL CA SCREEN DOC REV: CPT | Performed by: INTERNAL MEDICINE

## 2021-06-22 PROCEDURE — G8536 NO DOC ELDER MAL SCRN: HCPCS | Performed by: INTERNAL MEDICINE

## 2021-06-22 PROCEDURE — G8400 PT W/DXA NO RESULTS DOC: HCPCS | Performed by: INTERNAL MEDICINE

## 2021-06-22 PROCEDURE — 1101F PT FALLS ASSESS-DOCD LE1/YR: CPT | Performed by: INTERNAL MEDICINE

## 2021-06-22 PROCEDURE — 99214 OFFICE O/P EST MOD 30 MIN: CPT | Performed by: INTERNAL MEDICINE

## 2021-06-22 PROCEDURE — G8753 SYS BP > OR = 140: HCPCS | Performed by: INTERNAL MEDICINE

## 2021-06-22 PROCEDURE — G8755 DIAS BP > OR = 90: HCPCS | Performed by: INTERNAL MEDICINE

## 2021-06-22 PROCEDURE — G8417 CALC BMI ABV UP PARAM F/U: HCPCS | Performed by: INTERNAL MEDICINE

## 2021-06-22 RX ORDER — FLUTICASONE PROPIONATE 50 MCG
2 SPRAY, SUSPENSION (ML) NASAL DAILY
Qty: 1 BOTTLE | Refills: 11 | Status: SHIPPED | OUTPATIENT
Start: 2021-06-22 | End: 2021-06-25 | Stop reason: ALTCHOICE

## 2021-06-22 RX ORDER — ATORVASTATIN CALCIUM 40 MG/1
40 TABLET, FILM COATED ORAL
Qty: 30 TABLET | Refills: 11 | Status: SHIPPED | OUTPATIENT
Start: 2021-06-22 | End: 2021-10-18

## 2021-06-22 RX ORDER — CETIRIZINE HCL 10 MG
10 TABLET ORAL
Qty: 30 TABLET | Refills: 3 | Status: SHIPPED | OUTPATIENT
Start: 2021-06-22 | End: 2021-10-18

## 2021-06-22 NOTE — PROGRESS NOTES
Lien Dickerson is a 66 y.o. female    Chief Complaint   Patient presents with    Ankle swelling    Foot Swelling     1. Have you been to the ER, urgent care clinic since your last visit? Hospitalized since your last visit? No      2. Have you seen or consulted any other health care providers outside of the [address] [place] [address] since your last visit? Include any pap smears or colon screening.   No

## 2021-06-22 NOTE — PROGRESS NOTES
[facility]  Andrey Canales MD, [place], [place] [address] [address]  Phone:  [phone]  Fax: [phone]       Chief Complaint   Patient presents with    Ankle swelling    Foot Swelling   . SUBJECTIVE:    Manfred Brock is a 66 y.o. female The patient returns today with her granddaughter, Yelitza Andersen, whose phone number is [phone]. The patient complains of her feet and ankles swelling, and the swelling is when she first wakes up in the morning. She states it will not go down. There is no chest pain or shortness of breath. She saw Dr. Khurram Alfaro on June 17 for her Parkinson's disease and he noted it was improved with Sinemet and he continued to escalate up the dose to one and a half tablets to three tablets daily at the same interval.  She also had injection of her left sclera like a broken blood vessel and was advised to see an ophthalmologist.  The patient also complains of allergy symptoms, particularly with sneezing, she states, as well as rhinorrhea. The patient has no other specific complaints and is seen for evaluation. Current Outpatient Medications   Medication Sig Dispense Refill    cetirizine (ZYRTEC) 10 mg tablet Take 1 Tablet by mouth daily as needed for Allergies. 30 Tablet 3    fluticasone propionate (FLONASE) 50 mcg/actuation nasal spray 2 Sprays by Both Nostrils route daily. 1 Bottle 11    fluticasone propionate (Flonase) 50 mcg/actuation nasal spray 2 Sprays by Both Nostrils route daily. 1 Bottle 11    atorvastatin (LIPITOR) 40 mg tablet Take 1 Tablet by mouth nightly. 30 Tablet 11    carbidopa-levodopa (Sinemet)  mg per tablet 1.5 tablet at 8am, 12noon and 4pm 135 Tablet 3    furosemide (LASIX) 40 mg tablet TAKE 1 TABLET BY MOUTH EVERY DAY 90 Tab 3    pregabalin (Lyrica) 50 mg capsule Take 50 mg by mouth three (3) times daily.  hydrOXYchloroQUINE (Plaquenil) 200 mg tablet Take 400 mg by mouth daily.       fluticasone propion-salmeteroL (Advair Diskus) 250-50 mcg/dose diskus inhaler Take 1 Puff by inhalation every twelve (12) hours. 1 Each 11    diclofenac (VOLTAREN) 1 % gel Apply  to affected area four (4) times daily. 1 Each 11    carvediloL (COREG) 12.5 mg tablet TAKE 1 TABLET BY MOUTH TWICE A DAY WITH MEALS 180 Tab 1    potassium chloride (KLOR-CON) 20 mEq pack TAKE 1 PACKET DAILY BY MOUTH 90 Packet 1    amLODIPine (NORVASC) 5 mg tablet Take 1 Tab by mouth daily. 30 Tab 11    leflunomide (ARAVA) 20 mg tablet Take 1 Tab by mouth daily. 30 Tab 1    bacitracin-neomycin-polymyxin b-hydrocortisone (CORTISPORIN) 1 % ointment Apply  to affected area two (2) times a day.  15 g 11     Past Medical History:   Diagnosis Date    AF (paroxysmal atrial fibrillation) ([facility])     md reno    Bronchitis     Chronic back pain     md georgi [facility]    CKD (chronic kidney disease), stage III ([facility] 12/23/2019    selene solano md    COVID-19 virus detected 01/27/2021    High cholesterol     Hypertension     mary landaverde md    LBBB (left bundle branch block) 01/29/2012    Neurogenic dysfunction of the urinary bladder     Obesity     Rheumatoid arthritis ([facility]     tiffanie campbell md rheum -[facility]    S/P TKR (total knee replacement), right 2007    md yfn    Ulcer of right groin ([facility]      Past Surgical History:   Procedure Laterality Date    HC BLD ROTATABLE 4MM HR      HX BREAST REDUCTION Bilateral     2013    HX MOHS PROCEDURES      right    KY BREAST SURGERY PROCEDURE UNLISTED      breast reduction    [place] CORRECT BUNION,SIMPLE      [place] PLASTY KNEE,CONSTRAINED PROSTHESIS       Allergies   Allergen Reactions    Pcn [Penicillins] Swelling         REVIEW OF SYSTEMS:  General: negative for - chills or fever  ENT: negative for - headaches, nasal congestion or tinnitus  Respiratory: negative for - cough, hemoptysis, shortness of breath or wheezing  Cardiovascular : negative for - chest pain, edema, palpitations or shortness of breath  Gastrointestinal: negative for - abdominal pain, blood in stools, heartburn or nausea/vomiting  Genito-Urinary: no dysuria, trouble voiding, or hematuria  Musculoskeletal: negative for - gait disturbance, joint pain, joint stiffness or joint swelling  Neurological: no TIA or stroke symptoms  Hematologic: no bruises, no bleeding, no swollen glands  Integument: no lumps, mole changes, nail changes or rash  Endocrine: no malaise/lethargy or unexpected weight changes      Social History     Socioeconomic History    Marital status:      Spouse name: Not on file    Number of children: Not on file    Years of education: Not on file    Highest education level: Not on file   Tobacco Use    Smoking status: Former Smoker     Quit date: 1993     Years since quittin.0    Smokeless tobacco: Never Used   Vaping Use    Vaping Use: Never used   Substance and Sexual Activity    Alcohol use: Yes     Comment: social    Drug use: No    Sexual activity: Yes     Partners: Male   Social History Narrative    Habits:  She discontinued cigarettes about 25 years ago. She has occasional beer or glass of wine. No drug abuse.         Social History:  The patient is . Her daughter lives with her, who also takes care of her. She has four children, two sons and two daughters, alive and well. (Vicki Johnson) 45year old daughter  of MI. She has 17 grandchildren and 6 great grandchildren. Completed her GED and some college. Church preference is [employer].     [phone]        Family History:  Father  77 of seizure disorder, alcohol abuse. Mother  66 with dementia. One brother .      Social Determinants of Health     Financial Resource Strain:     Difficulty of Paying Living Expenses:    Food Insecurity:     Worried About Running Out of Food in the Last Year:     [address] in the Last Year:    Transportation Needs:  Lack of Transportation (Medical):  Lack of Transportation (Non-Medical):    Physical Activity:     Days of Exercise per Week:     Minutes of Exercise per Session:    Stress:     Feeling of Stress :    Social Connections:     Frequency of Communication with Friends and Family:     Frequency of Social Gatherings with Friends and Family:     Attends [employer] Services:     Active Member of Clubs or Organizations:     Attends Club or Organization Meetings:     Marital Status:      Family History   Problem Relation Age of Onset    Dementia Mother     Alcohol abuse Father        OBJECTIVE:    Visit Vitals  BP (!) 168/102 (BP 1 Location: Right arm, BP Patient Position: Sitting)   Pulse 71   Temp 97.7 °F (36.5 °C) (Oral)   Resp 18   Ht 5' 4\" (1.626 m)   Wt 206 lb 9.6 oz (93.7 kg)   SpO2 97%   BMI 35.46 kg/m²     CONSTITUTIONAL: well , well nourished, appears age appropriate  EYES: perrla, eom intact  ENMT:moist mucous membranes, pharynx clear  NECK: supple. Thyroid normal  RESPIRATORY: Chest: clear bilaterally   CARDIOVASCULAR: Heart: regular rate and rhythm  GASTROINTESTINAL: Abdomen: soft, bowel sounds active  HEMATOLOGIC: no pathological lymph nodes palpated  MUSCULOSKELETAL: Extremities: no edema, pulse 1+   INTEGUMENT: No unusual rashes or suspicious skin lesions noted. Nails appear normal.  NEUROLOGIC: non-focal exam   MENTAL STATUS: alert and oriented, appropriate affect           ASSESSMENT:  1. Localized edema    2. Essential hypertension    3. Stage 3b chronic kidney disease (HCC)    4. Class 1 obesity due to excess calories without serious comorbidity with body mass index (BMI) of 33.0 to 33.9 in adult    5. High cholesterol    6. AF (paroxysmal atrial fibrillation) ([facility]    7. Gait disorder      I think the edema is related to venous insufficiency. Other pathology to be excluded with appropriate laboratory studies today.   Blood pressure is a little elevated today, but she tells me blood pressures are usually in the 120s to 130s at home, and therefore, no adjustments will be made. There is a history of chronic kidney disease stage 3B for which we will check renal function today. Obesity remains a challenge for her. We encouraged a heart-healthy, weight-reducing diet. We have her on atorvastatin for her cholesterol. She follows with Dr. Nilson Hernandez regarding paroxysmal atrial fibrillation. For her weight disorder, she is doing a little bit of walking. She will be back to see me in three to four months or sooner if needed. I advised elevation of her lower extremities to resolve the edema. I have discussed the diagnosis with the patient and the intended plan as seen in the  Orders. The patient understands and agees with the plan. The patient has   received an after visit summary and questions were answered concerning  future plans  Patient labs and/or xrays were reviewed  Past records were reviewed. PLAN:  .  Orders Placed This Encounter    CBC WITH AUTOMATED DIFF    RENAL FUNCTION PANEL    NT-PRO BNP    cetirizine (ZYRTEC) 10 mg tablet    fluticasone propionate (FLONASE) 50 mcg/actuation nasal spray    fluticasone propionate (Flonase) 50 mcg/actuation nasal spray    atorvastatin (LIPITOR) 40 mg tablet       Follow-up and Dispositions    · Return in about 4 months (around 10/22/2021). ATTENTION:   This medical record was transcribed using an electronic medical records system. Although proofread, it may and can contain electronic and spelling errors. Other human spelling and other errors may be present. Corrections may be executed at a later time. Please feel free to contact us for any clarifications as needed.

## 2021-06-23 ENCOUNTER — TELEPHONE (OUTPATIENT)
Dept: FAMILY MEDICINE CLINIC | Age: 71
End: 2021-06-23

## 2021-06-23 DIAGNOSIS — I50.9 CHRONIC CONGESTIVE HEART FAILURE, UNSPECIFIED HEART FAILURE TYPE (HCC): Primary | ICD-10-CM

## 2021-06-23 LAB
ALBUMIN SERPL-MCNC: 3.6 G/DL (ref 3.7–4.7)
BASOPHILS # BLD AUTO: 0.1 X10E3/UL (ref 0–0.2)
BASOPHILS NFR BLD AUTO: 1 %
BUN SERPL-MCNC: 17 MG/DL (ref 8–27)
BUN/CREAT SERPL: 12 (ref 12–28)
CALCIUM SERPL-MCNC: 8.9 MG/DL (ref 8.7–10.3)
CHLORIDE SERPL-SCNC: 107 MMOL/L (ref 96–106)
CO2 SERPL-SCNC: 28 MMOL/L (ref 20–29)
CREAT SERPL-MCNC: 1.46 MG/DL (ref 0.57–1)
EOSINOPHIL # BLD AUTO: 0.3 X10E3/UL (ref 0–0.4)
EOSINOPHIL NFR BLD AUTO: 5 %
ERYTHROCYTE [DISTWIDTH] IN BLOOD BY AUTOMATED COUNT: 13 % (ref 11.7–15.4)
GLUCOSE SERPL-MCNC: 85 MG/DL (ref 65–99)
HCT VFR BLD AUTO: 31.5 % (ref 34–46.6)
HGB BLD-MCNC: 10.2 G/DL (ref 11.1–15.9)
IMM GRANULOCYTES # BLD AUTO: 0 X10E3/UL (ref 0–0.1)
IMM GRANULOCYTES NFR BLD AUTO: 0 %
LYMPHOCYTES # BLD AUTO: 1.5 X10E3/UL (ref 0.7–3.1)
LYMPHOCYTES NFR BLD AUTO: 23 %
MCH RBC QN AUTO: 31.2 PG (ref 26.6–33)
MCHC RBC AUTO-ENTMCNC: 32.4 G/DL (ref 31.5–35.7)
MCV RBC AUTO: 96 FL (ref 79–97)
MONOCYTES # BLD AUTO: 0.6 X10E3/UL (ref 0.1–0.9)
MONOCYTES NFR BLD AUTO: 9 %
NEUTROPHILS # BLD AUTO: 4.1 X10E3/UL (ref 1.4–7)
NEUTROPHILS NFR BLD AUTO: 62 %
NT-PROBNP SERPL-MCNC: 2369 PG/ML (ref 0–301)
PHOSPHATE SERPL-MCNC: 2.6 MG/DL (ref 3–4.3)
PLATELET # BLD AUTO: 132 X10E3/UL (ref 150–450)
POTASSIUM SERPL-SCNC: 4.5 MMOL/L (ref 3.5–5.2)
RBC # BLD AUTO: 3.27 X10E6/UL (ref 3.77–5.28)
SODIUM SERPL-SCNC: 142 MMOL/L (ref 134–144)
WBC # BLD AUTO: 6.6 X10E3/UL (ref 3.4–10.8)

## 2021-06-23 RX ORDER — LOSARTAN POTASSIUM 50 MG/1
50 TABLET ORAL DAILY
Qty: 90 TABLET | Refills: 3 | Status: SHIPPED | OUTPATIENT
Start: 2021-06-23 | End: 2022-05-28

## 2021-06-23 RX ORDER — FUROSEMIDE 40 MG/1
40 TABLET ORAL 2 TIMES DAILY
Qty: 180 TABLET | Refills: 3 | Status: SHIPPED | OUTPATIENT
Start: 2021-06-23

## 2021-06-23 NOTE — TELEPHONE ENCOUNTER
----- Message from Little Maharaj sent at 6/22/2021 12:11 PM EDT -----  Regarding: Dr. Deanne Tapia  Appointment not available    Caller's first and last name and relationship to patient (if not the patient): n/a      Best contact number: [phone]      Preferred date and time: any day in July 2021 @ 1130am VV      Scheduled appointment date and time: none available        Reason for appointment: Np Est Pcp      Details to clarify the request: Rik Devlin 13.

## 2021-06-24 DIAGNOSIS — I48.0 AF (PAROXYSMAL ATRIAL FIBRILLATION) (HCC): Primary | ICD-10-CM

## 2021-06-24 DIAGNOSIS — Z12.31 ENCOUNTER FOR SCREENING MAMMOGRAM FOR MALIGNANT NEOPLASM OF BREAST: ICD-10-CM

## 2021-06-24 DIAGNOSIS — I50.43 ACUTE ON CHRONIC COMBINED SYSTOLIC AND DIASTOLIC CONGESTIVE HEART FAILURE (HCC): ICD-10-CM

## 2021-06-25 ENCOUNTER — VIRTUAL VISIT (OUTPATIENT)
Dept: FAMILY MEDICINE CLINIC | Age: 71
End: 2021-06-25
Payer: MEDICARE

## 2021-06-25 DIAGNOSIS — M06.9 RHEUMATOID ARTHRITIS, INVOLVING UNSPECIFIED SITE, UNSPECIFIED WHETHER RHEUMATOID FACTOR PRESENT (HCC): ICD-10-CM

## 2021-06-25 DIAGNOSIS — N18.32 STAGE 3B CHRONIC KIDNEY DISEASE (HCC): ICD-10-CM

## 2021-06-25 DIAGNOSIS — Z76.89 ESTABLISHING CARE WITH NEW DOCTOR, ENCOUNTER FOR: ICD-10-CM

## 2021-06-25 DIAGNOSIS — I10 ESSENTIAL HYPERTENSION: Primary | ICD-10-CM

## 2021-06-25 DIAGNOSIS — R26.9 GAIT DISORDER: ICD-10-CM

## 2021-06-25 DIAGNOSIS — R60.0 BILATERAL LOWER EXTREMITY EDEMA: ICD-10-CM

## 2021-06-25 PROCEDURE — 99204 OFFICE O/P NEW MOD 45 MIN: CPT | Performed by: STUDENT IN AN ORGANIZED HEALTH CARE EDUCATION/TRAINING PROGRAM

## 2021-06-25 PROCEDURE — 1090F PRES/ABSN URINE INCON ASSESS: CPT | Performed by: STUDENT IN AN ORGANIZED HEALTH CARE EDUCATION/TRAINING PROGRAM

## 2021-06-25 PROCEDURE — 3017F COLORECTAL CA SCREEN DOC REV: CPT | Performed by: STUDENT IN AN ORGANIZED HEALTH CARE EDUCATION/TRAINING PROGRAM

## 2021-06-25 PROCEDURE — G8427 DOCREV CUR MEDS BY ELIG CLIN: HCPCS | Performed by: STUDENT IN AN ORGANIZED HEALTH CARE EDUCATION/TRAINING PROGRAM

## 2021-06-25 PROCEDURE — G8432 DEP SCR NOT DOC, RNG: HCPCS | Performed by: STUDENT IN AN ORGANIZED HEALTH CARE EDUCATION/TRAINING PROGRAM

## 2021-06-25 PROCEDURE — G9899 SCRN MAM PERF RSLTS DOC: HCPCS | Performed by: STUDENT IN AN ORGANIZED HEALTH CARE EDUCATION/TRAINING PROGRAM

## 2021-06-25 PROCEDURE — G8756 NO BP MEASURE DOC: HCPCS | Performed by: STUDENT IN AN ORGANIZED HEALTH CARE EDUCATION/TRAINING PROGRAM

## 2021-06-25 PROCEDURE — G8400 PT W/DXA NO RESULTS DOC: HCPCS | Performed by: STUDENT IN AN ORGANIZED HEALTH CARE EDUCATION/TRAINING PROGRAM

## 2021-06-25 PROCEDURE — 1101F PT FALLS ASSESS-DOCD LE1/YR: CPT | Performed by: STUDENT IN AN ORGANIZED HEALTH CARE EDUCATION/TRAINING PROGRAM

## 2021-06-25 NOTE — Clinical Note
Please schedule appointment with Dr Dayana Sharif (I don't think she has any availability) or on a day Dr Dayana Sharif is precepting with a resident she is precepting. Thank you!

## 2021-06-25 NOTE — PROGRESS NOTES
Zebedee Dubin Montes De Oca  66 y.o. female  1950  [address]  [id number]   [address]:    Telemedicine Progress Note  David Mora MD       Encounter Date and Time: June 25, 2021 at 3:41 PM    Consent:  She and/or the health care decision maker is aware that that she may receive a bill for this telephone service, depending on her insurance coverage, and has provided verbal consent to proceed: Yes    Chief Complaint   Patient presents with    New Patient    Leg Swelling     History of Present Illness   Luke Beach is a 66 y.o. female was evaluated by synchronous (real-time) audio-video technology from home, through the [address] Patient Sofia Waite, daughter gives majority of the history     Family has lost confidence in her current PCP who she has been seeing for the last 2 years and saw another PCP for many years prior to that     HTN  - Norvasc 5mg   - Cozaar 50mg, started 6/24/2021    Rheumatoid Arthritis  - followed by Dr Maryjane Rubinstein  - takes plaquenil 200mg daily + leflunomide 20mg     Parkinson's Disease   - sinemet 25-100mg  - Lyrica 50mg   - followed by Dr Keenan Cruz Veena: normal EEG    CKD IIIb  - Potassium 20meQ daily  - followed by Dr Primo Barajas with Pain Management for Neuropathy   - Dr Annah Kehr?   - Lyrica 50mg     Chronic Bronchitis  - Carmell Bile from PCP  - denies hx of COPD, Asthma, ILD; never on O2; no pulmonologist     HFrEF + Diastolic HF:  - coreg 69.6AQ BID   - Lasix 40mg daily   - next appt with cardiology 6/30  - ECHO 4/2021  - 2 ECHOs ordered in our system but never done   - ordered by previous PCP   - follows with Dr Vasquez Polk with [facility]     pAF  - not on [facility], maybe mistook parkinson tremor on EKG for a fib perdaughter  - needs EKG or EKG records from cardiology     Hx COVID-19  - January 2021 with worsening LE swelling since diagnosis  - had ECHO with cardiologist who said LE swelling NOT due to heart and nephrologist the same so they are at a crossroads  - recurrent falls during illness and daughter is concerned this is due to neuropathy as well as worsening LE edema    Denies hx of CVA- prescribed Lipitor 40mg but has never taken it     Review of Systems   Review of Systems   Constitutional: Negative for chills and fever. Respiratory: Negative for cough, sputum production, shortness of breath and wheezing. Cardiovascular: Positive for leg swelling. Negative for chest pain and palpitations. Musculoskeletal: Positive for falls. Negative for myalgias and neck pain. Neurological: Positive for tremors. Negative for dizziness, tingling, focal weakness and headaches. Vitals/Objective:     General: alert, cooperative, no distress, uses walker for gait stability    Mental  status: mental status: alert, oriented to person, place, and time, normal mood, behavior, speech, dress, motor activity, and thought processes   Resp: resp: normal effort and no respiratory distress   Ext:  Bilateral LE with moderate edema, grossly equal in size. No erythema   Neuro: neuro: no gross deficits   Skin: skin: no discoloration or lesions of concern on visible areas   Due to this being a TeleHealth evaluation, many elements of the physical examination are unable to be assessed. Assessment and Plan:   Time-based coding, delete if not needed: I spent at least 75 minutes with this new patient, and >50% of the time was spent counseling and/or coordinating care regarding multiple medication conditions and updating past medical, social surgical, family histories    1. Essential hypertension: stable   - Norvasc 5mg  - Cozaar 50mg daily     2.  Gait disorder/LE edema: worse since COVID diagnosis January 2021, requiring daily use of walker and with significant LE edema and neuropathy  - has cardiology followup 6/30  - will also touch base with nephrology regarding diuresis  - consider LE dopplers to r/o clot in case increased diuresis does not improve  - continue parkinson treatment and followup with Neurology     3. Rheumatoid arthritis, involving unspecified site, unspecified whether rheumatoid factor present ([facility]  - continue plaquenil and DMARD    4. Stage 3b chronic kidney disease ([facility]  - need to get records from Dr Kenji Reilly    5. Establishing care with new doctor, encounter for  - obtain last office notes from each specialist: can see Neuro notes, need cardiology, nephrology, pain management, rheumatology     6. Bilateral lower extremity edema  - will get in office appointment within a week of cardiology followup to monitor improvement if diuretic has been increased    Time spent in direct conversation with the patient to include medical condition(s) discussed, assessment and treatment plan:       We discussed the expected course, resolution and complications of the diagnosis(es) in detail. Medication risks, benefits, costs, interactions, and alternatives were discussed as indicated. I advised her to contact the office if her condition worsens, changes or fails to improve as anticipated. She expressed understanding with the diagnosis(es) and plan. Patient understands that this encounter was a temporary measure, and the importance of further follow up and examination was emphasized. Patient verbalized understanding. Patient informed to follow up: Follow-up and Dispositions  ·   Return in about 2 weeks (around 7/9/2021) for followup lower extremity edema. Routing History          Electronically Signed: David Mora MD    Providers location when delivering service: hospital    CPT Codes 59854-22636 for Established Patients may apply to this Telehealth Visit. POS code: 18.   Modifier GT      Pursuant to the emergency declaration under the [facility] waiver authority and the "[employer] and [employer] Act, this Virtual  Visit was conducted, with patient's consent, to reduce the patient's risk of exposure to COVID-19 and provide continuity of care for an established patient. Services were provided through a video synchronous discussion virtually to substitute for in-person clinic visit. History   Patients past medical, surgical and family histories were reviewed and updated.       Past Medical History:   Diagnosis Date    AF (paroxysmal atrial fibrillation) (HCC)     md [facility]    Bronchitis     Chronic back pain     md georgi [facility]    CKD (chronic kidney disease), stage III ([facility] 2019    selene solano md    COVID-19 virus detected 2021    High cholesterol     Hypertension     mary landaverde md    LBBB (left bundle branch block) 2012    Neurogenic dysfunction of the urinary bladder     Obesity     Rheumatoid arthritis ([facility]     tiffanie campbell md rheum -vcu    S/P TKR (total knee replacement), right     md yfn    Ulcer of right groin ([facility]      Past Surgical History:   Procedure Laterality Date    HC BLD ROTATABLE 4MM HR      HX BREAST REDUCTION Bilateral     2013    HX MOHS PROCEDURES      right    DE BREAST SURGERY PROCEDURE UNLISTED      breast reduction    DE CORRECT BUNION,SIMPLE      DE PLASTY KNEE,CONSTRAINED PROSTHESIS       Family History   Problem Relation Age of Onset    Dementia Mother     Alcohol abuse Father      Social History     Socioeconomic History    Marital status:      Spouse name: Not on file    Number of children: Not on file    Years of education: Not on file    Highest education level: Not on file   Occupational History    Not on file   Tobacco Use    Smoking status: Former Smoker     Quit date: 1993     Years since quittin.0    Smokeless tobacco: Never Used   Vaping Use    Vaping Use: Never used   Substance and Sexual Activity    Alcohol use: Yes     Comment: social    Drug use: No    Sexual activity: Yes     Partners: Male   Other Topics Concern    Not on file Social History Narrative    Habits:  She discontinued cigarettes about 25 years ago. She has occasional beer or glass of wine. No drug abuse.         Social History:  The patient is . Her daughter lives with her, who also takes care of her. She has four children, two sons and two daughters, alive and well. (Malick Wolff) 45year old daughter  of MI. She has 17 grandchildren and 6 great grandchildren. Completed her GED and some college. Zoroastrianism preference is [email or web address].     [phone]        Family History:  Father  77 of seizure disorder, alcohol abuse. Mother  66 with dementia. One brother . Social Determinants of Health     Financial Resource Strain:     Difficulty of Paying Living Expenses:    Food Insecurity:     Worried About Running Out of Food in the Last Year:     [address] in the Last Year:    Transportation Needs:     Lack of Transportation (Medical):      Lack of Transportation (Non-Medical):    Physical Activity:     Days of Exercise per Week:     Minutes of Exercise per Session:    Stress:     Feeling of Stress :    Social Connections:     Frequency of Communication with Friends and Family:     Frequency of Social Gatherings with Friends and Family:     Attends Zoroastrianism Services:     Active Member of Clubs or Organizations:     Attends Club or Organization Meetings:     Marital Status:    Intimate Partner Violence:     Fear of Current or Ex-Partner:     Emotionally Abused:     Physically Abused:     Sexually Abused:      Patient Active Problem List   Diagnosis Code    CKD (chronic kidney disease), stage III ([facility] [facility] N18.30    Obesity E66.9    Hypertension I10    High cholesterol E78.00    Chronic back pain M54.9, G89.29    Bronchitis J40    Arthritis M19.90    Rheumatoid arthritis ([facility] M06.9    LBBB (left bundle branch block) I44.7    Neurogenic dysfunction of the urinary bladder N31.9    COVID-19 virus detected U07.1    AF (paroxysmal atrial fibrillation) ([facility] I48.0    Gait disorder R26.9          Current Medications/Allergies   Medications and Allergies reviewed:    Current Outpatient Medications   Medication Sig Dispense Refill    losartan (COZAAR) 50 mg tablet Take 1 Tablet by mouth daily. 90 Tablet 3    furosemide (LASIX) 40 mg tablet Take 1 Tablet by mouth two (2) times a day. 180 Tablet 3    carbidopa-levodopa (Sinemet)  mg per tablet 1.5 tablet at 8am, 12noon and 4pm 135 Tablet 3    pregabalin (Lyrica) 50 mg capsule Take 50 mg by mouth three (3) times daily.  hydrOXYchloroQUINE (Plaquenil) 200 mg tablet Take 400 mg by mouth daily.  carvediloL (COREG) 12.5 mg tablet TAKE 1 TABLET BY MOUTH TWICE A DAY WITH MEALS 180 Tab 1    potassium chloride (KLOR-CON) 20 mEq pack TAKE 1 PACKET DAILY BY MOUTH 90 Packet 1    amLODIPine (NORVASC) 5 mg tablet Take 1 Tab by mouth daily. 30 Tab 11    leflunomide (ARAVA) 20 mg tablet Take 1 Tab by mouth daily. 30 Tab 1    bacitracin-neomycin-polymyxin b-hydrocortisone (CORTISPORIN) 1 % ointment Apply  to affected area two (2) times a day. 15 g 11    cetirizine (ZYRTEC) 10 mg tablet Take 1 Tablet by mouth daily as needed for Allergies. (Patient not taking: Reported on 6/25/2021) 30 Tablet 3    atorvastatin (LIPITOR) 40 mg tablet Take 1 Tablet by mouth nightly. (Patient not taking: Reported on 6/25/2021) 30 Tablet 11    diclofenac (VOLTAREN) 1 % gel Apply  to affected area four (4) times daily.  (Patient not taking: Reported on 6/25/2021) 1 Each 11     Allergies   Allergen Reactions    Pcn [Penicillins] Swelling

## 2021-06-25 NOTE — PROGRESS NOTES
[address] Medicine Residency Attending Addendum:  Dr. Galindo Degroot MD,  the patient and I were not physically present during this encounter. The resident and I are concurrently monitoring the patient care through appropriate telecommunication technology. I discussed the findings, assessment and plan with the resident and agree with the resident's findings and plan as documented in the resident's note.       Brayan Connolly MD

## 2021-06-29 PROBLEM — I51.89 GRADE I DIASTOLIC DYSFUNCTION: Status: ACTIVE | Noted: 2021-04-08

## 2021-07-05 ENCOUNTER — TRANSCRIBE ORDER (OUTPATIENT)
Dept: SCHEDULING | Age: 71
End: 2021-07-05

## 2021-07-05 DIAGNOSIS — R60.0 EDEMA, LEG: Primary | ICD-10-CM

## 2021-07-10 ENCOUNTER — HOSPITAL ENCOUNTER (OUTPATIENT)
Dept: VASCULAR SURGERY | Age: 71
Discharge: HOME | End: 2021-07-10
Attending: INTERNAL MEDICINE
Payer: MEDICARE

## 2021-07-10 DIAGNOSIS — R60.0 EDEMA, LEG: ICD-10-CM

## 2021-07-10 PROCEDURE — 93970 EXTREMITY STUDY: CPT

## 2021-07-12 ENCOUNTER — VIRTUAL VISIT (OUTPATIENT)
Dept: FAMILY MEDICINE CLINIC | Age: 71
End: 2021-07-12
Payer: MEDICARE

## 2021-07-12 DIAGNOSIS — M79.89 SWELLING OF LOWER LEG: Primary | ICD-10-CM

## 2021-07-12 PROCEDURE — 99442 PR PHYS/QHP TELEPHONE EVALUATION 11-20 MIN: CPT | Performed by: FAMILY MEDICINE

## 2021-07-12 NOTE — PROGRESS NOTES
Amanuel Palacio  66 y.o. female  1950  [address]  [id number]   Mesha Vanegas MD       Encounter Date and Time: July 12, 2021 at 1:33 PM    Consent: Amanuel Palacio, who was seen by synchronous (real-time) audio technology, and/or her healthcare decision maker, is aware that this patient-initiated, Telehealth encounter on 7/12/2021 is a billable service, with coverage as determined by her insurance carrier. She is aware that she may receive a bill and has provided verbal consent to proceed: Yes. Chief Complaint   Patient presents with    Leg Swelling     History of Present Illness   Patricia Montes De Oca is a 66 y.o. female was evaluated by synchronous (real-time) audio-video technology from home, through a secure patient portal.    Concern for feet and ankle swelling for 6 months. Had LE US of bilateral lower extremity venous duplex was negative for deep and superficial vein thrombosis on 7/10/21. States she had an EKG and ECHO 4/2021 at [facility] and was told these were normal.  Has Cardiologist, Dr. Matthew German and she states he doesn't think it's the heart. Has a Nephrologist and was told to see a Neurologist.      Review of Systems   Review of Systems   Respiratory: Negative for shortness of breath. Cardiovascular: Negative for chest pain. Vitals/Objective:     O: Patient speaking in complete sentences without effort. Normal speech and cooperative. Due to this being a TeleHealth evaluation, many elements of the physical examination are unable to be assessed. Assessment and Plan:       1. Swelling of lower leg  -No acute concerns. Will need evaluation in the clinic. May need urine studies to assess for protein and better care coordination between Cardiologist and Nephrologist.  Unable to see swelling as this was an audio encounter. Lymphedema playing a part? She will call to make in person appt.         Time spent in direct conversation with the patient to include medical condition(s) discussed, assessment and treatment plan:       We discussed the expected course, resolution and complications of the diagnosis(es) in detail. Medication risks, benefits, costs, interactions, and alternatives were discussed as indicated. I advised her to contact the office if her condition worsens, changes or fails to improve as anticipated. She expressed understanding with the diagnosis(es) and plan. Patient understands that this encounter was a temporary measure, and the importance of further follow up and examination was emphasized. Patient verbalized understanding. Patient informed to follow up:     Electronically Signed: Oriana Boone MD    CPT Codes 03712-51137 for Established Patients may apply to this Telehealth Visit. POS code: 18. Modifier GRAY Gimenez is a 66 y.o. female who was evaluated by an audio only encounter for concerns as above. Patient identification was verified prior to start of the visit. A caregiver was present when appropriate. Due to this being a TeleHealth encounter (During [facility] public health emergency), evaluation of the following organ systems was limited: Vitals/Constitutional/EENT/Resp/CV/GI//MS/Neuro/Skin/Heme-Lymph-Imm. Pursuant to the emergency declaration under the [facility] waiver authority and the [employer] and [employer] Act, this Virtual Visit was conducted, with patient's (and/or legal guardian's) consent, to reduce the patient's risk of exposure to COVID-19 and provide necessary medical care. Services were provided through a synchronous discussion virtually to substitute for in-person clinic visit. I was at home. The patient was at home. History   Patients past medical, surgical and family histories were reviewed and updated.       Past Medical History:   Diagnosis Date    AF (paroxysmal atrial fibrillation) (HCC) md [place]    Bronchitis     Chronic back pain     md ge[facility]i [facility]    CKD (chronic kidney disease), stage III ([facility] [address] [facility] 2019    selene solano md    COVID-19 virus detected 2021    Grade I diastolic dysfunction     ef 55 %    High cholesterol     Hypertension     mary landaverde md    LBBB (left bundle branch block) 2012    Neurogenic dysfunction of the urinary bladder     Obesity     [facility]     tiffanie campbell md rheum -[facility]    S/P TKR (total knee replacement), right     md yfn    Ulcer of right groin ([facility]      Past Surgical History:   Procedure Laterality Date    HC BLD ROTATABLE 4MM HR      HX BREAST REDUCTION Bilateral     2013    HX MOHS PROCEDURES      right    MN BREAST SURGERY PROCEDURE UNLISTED      breast reduction    MN CORRECT BUNION,SIMPLE      MN PLASTY KNEE,CONSTRAINED PROSTHESIS       Family History   Problem Relation Age of Onset    Dementia Mother     Alcohol abuse Father      Social History     Tobacco Use    Smoking status: Former Smoker     Quit date: 1993     Years since quittin.0    Smokeless tobacco: Never Used   Vaping Use    Vaping Use: Never used   Substance Use Topics    Alcohol use: Yes     Comment: social    Drug use: No     Patient Active Problem List   Diagnosis Code    CKD (chronic kidney disease), stage III ([facility] N18.30    Obesity E66.9    Hypertension I10    High cholesterol E78.00    Chronic back pain M54.9, G89.29    Bronchitis J40    Arthritis M19.90    Rheumatoid arthritis ([facility] M06.9    LBBB (left bundle branch block) I44.7    Neurogenic dysfunction of the urinary bladder N31.9    COVID-19 virus detected U07.1    AF (paroxysmal atrial fibrillation) ([facility] I48.0    Gait disorder R26.9    Grade I diastolic dysfunction J54.0          Current Medications/Allergies   Medications and Allergies reviewed:    Current Outpatient Medications   Medication Sig Dispense Refill  losartan (COZAAR) 50 mg tablet Take 1 Tablet by mouth daily. 90 Tablet 3    furosemide (LASIX) 40 mg tablet Take 1 Tablet by mouth two (2) times a day. 180 Tablet 3    cetirizine (ZYRTEC) 10 mg tablet Take 1 Tablet by mouth daily as needed for Allergies. (Patient not taking: Reported on 6/25/2021) 30 Tablet 3    atorvastatin (LIPITOR) 40 mg tablet Take 1 Tablet by mouth nightly. (Patient not taking: Reported on 6/25/2021) 30 Tablet 11    carbidopa-levodopa (Sinemet)  mg per tablet 1.5 tablet at 8am, 12noon and 4pm 135 Tablet 3    pregabalin (Lyrica) 50 mg capsule Take 50 mg by mouth three (3) times daily.  hydrOXYchloroQUINE (Plaquenil) 200 mg tablet Take 400 mg by mouth daily.  diclofenac (VOLTAREN) 1 % gel Apply  to affected area four (4) times daily. (Patient not taking: Reported on 6/25/2021) 1 Each 11    carvediloL (COREG) 12.5 mg tablet TAKE 1 TABLET BY MOUTH TWICE A DAY WITH MEALS 180 Tab 1    potassium chloride (KLOR-CON) 20 mEq pack TAKE 1 PACKET DAILY BY MOUTH 90 Packet 1    amLODIPine (NORVASC) 5 mg tablet Take 1 Tab by mouth daily. 30 Tab 11    leflunomide (ARAVA) 20 mg tablet Take 1 Tab by mouth daily. 30 Tab 1    bacitracin-neomycin-polymyxin b-hydrocortisone (CORTISPORIN) 1 % ointment Apply  to affected area two (2) times a day.  15 g 11     Allergies   Allergen Reactions    Pcn [Penicillins] Swelling

## 2021-08-17 ENCOUNTER — OFFICE VISIT (OUTPATIENT)
Dept: NEUROLOGY | Age: 71
End: 2021-08-17
Payer: MEDICARE

## 2021-08-17 VITALS
DIASTOLIC BLOOD PRESSURE: 100 MMHG | HEART RATE: 67 BPM | BODY MASS INDEX: 35.36 KG/M2 | OXYGEN SATURATION: 98 % | WEIGHT: 206 LBS | RESPIRATION RATE: 16 BRPM | SYSTOLIC BLOOD PRESSURE: 162 MMHG

## 2021-08-17 DIAGNOSIS — G20 PARKINSONISM, UNSPECIFIED PARKINSONISM TYPE (HCC): Primary | ICD-10-CM

## 2021-08-17 PROCEDURE — G8536 NO DOC ELDER MAL SCRN: HCPCS | Performed by: PSYCHIATRY & NEUROLOGY

## 2021-08-17 PROCEDURE — G8417 CALC BMI ABV UP PARAM F/U: HCPCS | Performed by: PSYCHIATRY & NEUROLOGY

## 2021-08-17 PROCEDURE — 3017F COLORECTAL CA SCREEN DOC REV: CPT | Performed by: PSYCHIATRY & NEUROLOGY

## 2021-08-17 PROCEDURE — G8432 DEP SCR NOT DOC, RNG: HCPCS | Performed by: PSYCHIATRY & NEUROLOGY

## 2021-08-17 PROCEDURE — G8755 DIAS BP > OR = 90: HCPCS | Performed by: PSYCHIATRY & NEUROLOGY

## 2021-08-17 PROCEDURE — 1101F PT FALLS ASSESS-DOCD LE1/YR: CPT | Performed by: PSYCHIATRY & NEUROLOGY

## 2021-08-17 PROCEDURE — 99214 OFFICE O/P EST MOD 30 MIN: CPT | Performed by: PSYCHIATRY & NEUROLOGY

## 2021-08-17 PROCEDURE — G8753 SYS BP > OR = 140: HCPCS | Performed by: PSYCHIATRY & NEUROLOGY

## 2021-08-17 PROCEDURE — 1090F PRES/ABSN URINE INCON ASSESS: CPT | Performed by: PSYCHIATRY & NEUROLOGY

## 2021-08-17 PROCEDURE — G9899 SCRN MAM PERF RSLTS DOC: HCPCS | Performed by: PSYCHIATRY & NEUROLOGY

## 2021-08-17 PROCEDURE — G8427 DOCREV CUR MEDS BY ELIG CLIN: HCPCS | Performed by: PSYCHIATRY & NEUROLOGY

## 2021-08-17 PROCEDURE — G8400 PT W/DXA NO RESULTS DOC: HCPCS | Performed by: PSYCHIATRY & NEUROLOGY

## 2021-08-17 NOTE — PROGRESS NOTES
[facility] and  [address] at [facility] at [address] [facility], [address] [address] [address] [address] [facility], [address] [place] [address]   [phone]              Chief Complaint   Patient presents with    Parkinsons Disease     Current Outpatient Medications   Medication Sig Dispense Refill    losartan (COZAAR) 50 mg tablet Take 1 Tablet by mouth daily. 90 Tablet 3    furosemide (LASIX) 40 mg tablet Take 1 Tablet by mouth two (2) times a day. 180 Tablet 3    carbidopa-levodopa (Sinemet)  mg per tablet 1.5 tablet at 8am, 12noon and 4pm 135 Tablet 3    pregabalin (Lyrica) 50 mg capsule Take 50 mg by mouth three (3) times daily.  hydrOXYchloroQUINE (Plaquenil) 200 mg tablet Take 400 mg by mouth daily.  carvediloL (COREG) 12.5 mg tablet TAKE 1 TABLET BY MOUTH TWICE A DAY WITH MEALS 180 Tab 1    potassium chloride (KLOR-CON) 20 mEq pack TAKE 1 PACKET DAILY BY MOUTH 90 Packet 1    leflunomide (ARAVA) 20 mg tablet Take 1 Tab by mouth daily. 30 Tab 1    cetirizine (ZYRTEC) 10 mg tablet Take 1 Tablet by mouth daily as needed for Allergies. (Patient not taking: Reported on 2021) 30 Tablet 3    atorvastatin (LIPITOR) 40 mg tablet Take 1 Tablet by mouth nightly. (Patient not taking: Reported on 2021) 30 Tablet 11    diclofenac (VOLTAREN) 1 % gel Apply  to affected area four (4) times daily. (Patient not taking: Reported on 2021) 1 Each 11    amLODIPine (NORVASC) 5 mg tablet Take 1 Tab by mouth daily. (Patient not taking: Reported on 2021) 30 Tab 11    bacitracin-neomycin-polymyxin b-hydrocortisone (CORTISPORIN) 1 % ointment Apply  to affected area two (2) times a day.  15 g 11      Allergies   Allergen Reactions    Pcn [Penicillins] Swelling     Social History     Tobacco Use    Smoking status: Former Smoker     Quit date: 1993     Years since quittin.1    Smokeless tobacco: Never Used   Vaping Use    Vaping Use: Never used   Substance Use Topics    Alcohol use: Yes     Comment: social    Drug use: No     78-year-old lady returns today for follow-up accompanied by her daughter for Parkinson's. Last visit we increased her Sinemet to a dose of 1.5 tablets 3 times daily. She has noticed that she is walking better. Daughter notes some extra movements. Examination  Visit Vitals  BP (!) 162/100 (BP 1 Location: Left upper arm, BP Patient Position: Sitting, BP Cuff Size: Large adult)   Pulse 67   Resp 16   Wt 93.4 kg (206 lb)   SpO2 98%   BMI 35.36 kg/m²     Awake alert oriented and conversant. Normal speech and language. Rest tremor of the right hand with cogwheeling at the wrist bilaterally. Dyskinesia of mild degree present. Ambulates with a slight hunch with a better stride than previous. Takes a 3 step turn. Steady    Impression/Plan  Parkinson's which has improved with higher dose of Sinemet  Some dyskinesia secondary to Sinemet i.e. side effect  At this juncture maintain current dose of Sinemet  We will have her go to the [facility]    Follow after    Lesley Hamm MD        This note was created using voice recognition software. Despite editing, there may be syntax errors.

## 2021-08-17 NOTE — PROGRESS NOTES
Chief Complaint   Patient presents with    Parkinsons Disease     Gait improved, tremor more noticeable in lower body now.     Having difficulty with \"her doctors and specialists being on same page\"  Dr. Lisette Castillo and Dion Sánchez added to pt care team so notes may be sent to them as well

## 2021-09-15 RX ORDER — CARBIDOPA AND LEVODOPA 25; 100 MG/1; MG/1
TABLET ORAL
Qty: 405 TABLET | Refills: 1 | Status: SHIPPED | OUTPATIENT
Start: 2021-09-15 | End: 2022-05-23

## 2021-10-18 ENCOUNTER — OFFICE VISIT (OUTPATIENT)
Dept: NEUROLOGY | Age: 71
End: 2021-10-18
Payer: MEDICARE

## 2021-10-18 VITALS
SYSTOLIC BLOOD PRESSURE: 136 MMHG | OXYGEN SATURATION: 96 % | WEIGHT: 206 LBS | HEART RATE: 76 BPM | DIASTOLIC BLOOD PRESSURE: 86 MMHG | BODY MASS INDEX: 35.36 KG/M2 | TEMPERATURE: 97.4 F | RESPIRATION RATE: 16 BRPM

## 2021-10-18 DIAGNOSIS — G20 PARKINSONISM, UNSPECIFIED PARKINSONISM TYPE (HCC): Primary | ICD-10-CM

## 2021-10-18 PROCEDURE — G9899 SCRN MAM PERF RSLTS DOC: HCPCS | Performed by: PSYCHIATRY & NEUROLOGY

## 2021-10-18 PROCEDURE — G8417 CALC BMI ABV UP PARAM F/U: HCPCS | Performed by: PSYCHIATRY & NEUROLOGY

## 2021-10-18 PROCEDURE — 99214 OFFICE O/P EST MOD 30 MIN: CPT | Performed by: PSYCHIATRY & NEUROLOGY

## 2021-10-18 PROCEDURE — 1101F PT FALLS ASSESS-DOCD LE1/YR: CPT | Performed by: PSYCHIATRY & NEUROLOGY

## 2021-10-18 PROCEDURE — G8536 NO DOC ELDER MAL SCRN: HCPCS | Performed by: PSYCHIATRY & NEUROLOGY

## 2021-10-18 PROCEDURE — G8432 DEP SCR NOT DOC, RNG: HCPCS | Performed by: PSYCHIATRY & NEUROLOGY

## 2021-10-18 PROCEDURE — 1090F PRES/ABSN URINE INCON ASSESS: CPT | Performed by: PSYCHIATRY & NEUROLOGY

## 2021-10-18 PROCEDURE — G8754 DIAS BP LESS 90: HCPCS | Performed by: PSYCHIATRY & NEUROLOGY

## 2021-10-18 PROCEDURE — G8752 SYS BP LESS 140: HCPCS | Performed by: PSYCHIATRY & NEUROLOGY

## 2021-10-18 PROCEDURE — 3017F COLORECTAL CA SCREEN DOC REV: CPT | Performed by: PSYCHIATRY & NEUROLOGY

## 2021-10-18 PROCEDURE — G8427 DOCREV CUR MEDS BY ELIG CLIN: HCPCS | Performed by: PSYCHIATRY & NEUROLOGY

## 2021-10-18 PROCEDURE — G8400 PT W/DXA NO RESULTS DOC: HCPCS | Performed by: PSYCHIATRY & NEUROLOGY

## 2021-10-18 RX ORDER — CARBIDOPA 25 MG/1
25 TABLET ORAL 3 TIMES DAILY
Qty: 90 TABLET | Refills: 3 | Status: SHIPPED | OUTPATIENT
Start: 2021-10-18 | End: 2022-01-12

## 2021-10-18 NOTE — PROGRESS NOTES
[facility] and  [address] at [facility] at [address] [facility], [address] [address] [address] [facility], [address] [place] [address]   [phone]              Chief Complaint   Patient presents with    Parkinsons Disease     Current Outpatient Medications   Medication Sig Dispense Refill    carbidopa-levodopa (SINEMET)  mg per tablet TAKE 1.5 TABLETS BY MOUTH AT 8 AM NOON AND 4  Tablet 1    losartan (COZAAR) 50 mg tablet Take 1 Tablet by mouth daily. 90 Tablet 3    furosemide (LASIX) 40 mg tablet Take 1 Tablet by mouth two (2) times a day. 180 Tablet 3    pregabalin (Lyrica) 50 mg capsule Take 50 mg by mouth three (3) times daily.  hydrOXYchloroQUINE (Plaquenil) 200 mg tablet Take 400 mg by mouth daily.  carvediloL (COREG) 12.5 mg tablet TAKE 1 TABLET BY MOUTH TWICE A DAY WITH MEALS 180 Tab 1    potassium chloride (KLOR-CON) 20 mEq pack TAKE 1 PACKET DAILY BY MOUTH 90 Packet 1    leflunomide (ARAVA) 20 mg tablet Take 1 Tab by mouth daily. 30 Tab 1    bacitracin-neomycin-polymyxin b-hydrocortisone (CORTISPORIN) 1 % ointment Apply  to affected area two (2) times a day. 15 g 11    cetirizine (ZYRTEC) 10 mg tablet Take 1 Tablet by mouth daily as needed for Allergies. (Patient not taking: Reported on 2021) 30 Tablet 3    atorvastatin (LIPITOR) 40 mg tablet Take 1 Tablet by mouth nightly. (Patient not taking: Reported on 2021) 30 Tablet 11    diclofenac (VOLTAREN) 1 % gel Apply  to affected area four (4) times daily. (Patient not taking: Reported on 2021) 1 Each 11    amLODIPine (NORVASC) 5 mg tablet Take 1 Tab by mouth daily.  (Patient not taking: Reported on 2021) 30 Tab 11      Allergies   Allergen Reactions    Pcn [Penicillins] Swelling     Social History     Tobacco Use    Smoking status: Former Smoker     Quit date: 1993     Years since quittin.3    Smokeless tobacco: Never Used   Vaping Use    Vaping Use: Never used   Substance Use Topics    Alcohol use: Yes     Comment: social    Drug use: No     73-year-old lady returns today for follow-up Parkinson's disease. Last visit with me was in August and at that time she had some dyskinesia of mild degree. We maintained her current dose of Sinemet. We recommended she go to the [email or web address] program.  About a week or so after that appointment she was hospitalized secondary to volume overload. She stayed about 4 days. Her medicines were adjusted. She never made it to the big program.  She is getting some therapy at home right now. Daughter has noticed some extra movements i.e. dyskinesia. She has been doing well with therapy. Still a bit bradykinetic at times. Daughter notes that she is having some difficulty with mood. She has lack of motivation at times. Good questions about Parkinson's today. She is also having side effects with the Sinemet of nausea and we discussed    Examination  Visit Vitals  /86 (BP 1 Location: Left upper arm, BP Patient Position: Sitting, BP Cuff Size: Large adult)   Pulse 76   Temp 97.4 °F (36.3 °C)   Resp 16   Wt 93.4 kg (206 lb)   SpO2 96%   BMI 35.36 kg/m²     She is awake alert and oriented. A bit hypophonic. No nystagmus with full versions. No rest tremor. Cogwheeling at the wrist.  Mild bradykinesia. Fair stride. Dyskinesia present. Impression/Plan  Parkinson's with dyskinesia  At this point maintain 1.5 tablets of Sinemet 3 times daily  Add Lodosyn 25 mg extra to each dose of Sinemet to counter the nausea  Once she is completed her post hospitalization therapy she will go to the [employer] program at [address] [place] [address] in about 3 months    Afshin Lanza MD        This note was created using voice recognition software. Despite editing, there may be syntax errors.

## 2021-11-02 ENCOUNTER — HOSPITAL ENCOUNTER (OUTPATIENT)
Dept: PHYSICAL THERAPY | Age: 71
Discharge: HOME | End: 2021-11-02
Payer: MEDICARE

## 2021-11-02 PROCEDURE — 97162 PT EVAL MOD COMPLEX 30 MIN: CPT

## 2021-11-02 PROCEDURE — 97535 SELF CARE MNGMENT TRAINING: CPT

## 2021-11-02 NOTE — PROGRESS NOTES
PT INITIAL EVALUATION NOTE - [facility] 2-15    Patient Name: Cristina Sales  Date:2021  : 1950  [x]  Patient  Verified  Payor: Jael Alberto / Plan: [facility] [address] [facility] HMO / Product Type: Managed Care Medicare /    In time: 11:10  Out time: 12:30  Total Treatment Time (min): 80  Total Timed Codes (min): 20  1:1 Treatment Time ( [address] only): 20   Visit #: 1     Treatment Area:  Other abnormal involuntary movements [R25.8]  Other abnormalities of gait and mobility [R26.89]    SUBJECTIVE  Pain Level (0-10 scale): Current- 0, Best- 0, Worst- back pain and B groin pain     Any medication changes, allergies to medications, adverse drug reactions, diagnosis change, or new procedure performed?: [] No    [x] Yes (see summary sheet for update)  Subjective:      Chief complaint: R hand pain, weakness following Covid in 2021, decreased activity level   Date of initial PD diagnosis: 2021  Medication timin am, 2 pm and 8pm, evaluation in off time; notes she sometimes has end of dose difficulties     Falls: 0, weakness after Covid caused fear of falling and weakness/giving way in legs, notes this weakness has improved   Freezing: denies currently but previously experienced, patient notes triggers to be doorway, standing up from toilet   Numbness/tingling: denies  Patient reported functional limitations: gross- donning socks and bra, pulling up her pants, gripping and turning steering wheel; fine- buttoning and zipping jacket    PLOF: Prior to diagnosis, patient needed help with bathing and getting up out of the chair, actively going to bingo on  and  and could do stairs before  Mechanism of Injury: PD  Previous Treatment/Compliance: First encounter for PT  PMHx/Surgical Hx: chronic back pain, obesity, HTN, bronchitis, high cholesterol, CKD, RA, ulcer of right groin, LBBB, neurogenic bladder, covid-19, A-fib, grade 1 diastolic dysfunction, s/p R TKR, bunion surgery, breast reduction  Work Hx: retired, enjoys going to [employer] Situation: [address], stairs at home but everything she needs is on one level, lives in the house with her daughter and     Pt Goals: \"Being able to use my R hand, comb my hair, and walk more, and drive the truck. \"   Barriers: None noted   Motivation: Daughter notes requirement for encouragement to achieve full potential  Substance use: None noted  Cognition: A & O x 3        OBJECTIVE/EXAMINATION  Gait:  Absent arm swing B, decreased step length R>L, lacking trunk rotation, bradykinesia and hypokinesia increased when ambulating without rollator     Posture: slouched in chair, moderate forward head/rounded shoulders     Tremor/dyskinesia:   R hand tremor, dyskinesia B hips in sitting      Rigidity: R>L UE, BLE WNL     Gross UE MMT: R flexion limited to roughly 100, abduction to roughly 95, R hand ulnar drift and pain, supination limited on R     Gross LE MMT: Grossly WNL      Gross UE ROM: WNL    Gross LE ROM:   Grossly 4/5 or better                            Sensation: Intact and equal bilaterally     Coordination:     Dysdiadochokinesia:WNL    Finger to nose: bradykinesia R side     Heel to shin: hypokinetic R side     Timed ADL's: donning jacket: 1 minute 2 seconds, hypokinetic and decreased dexterity of R hand    Functional Mobility              Transfers:       Sit-Stand: BUE assist required, explains sometimes able without             Stairs: Able to ascend/descend [address] her house, step-to pattern and UE assistance       Functional Tests:       Five time sit to stand: 21 seconds, BUE assistance (>16 seconds indicates increased fall risk for PD community)           3 step TUG   TU seconds (>12 seconds indicates increased fall risk) Patient demonstrates most difficulty with turn and sit <> stand, absent arm swing, decreased step length    Manual: 40 seconds (>13.2 indicates increased fall risk)     Cognitive: 38 seconds (>14.7 indicates increased fall risk) Significant decreased step length with cognitive task               20 min Self Care/Home Management:  []  See flow sheet : education on PD, how to challenge at home, LSVT protocol and variation from normal PT POC, importance of HEP completion    Rationale: explanation of LSVT and home exercise contribution   to improve the patients ability to perform program               With   [] TE   [] TA   [] Neuro   [] SC   [] other: Patient Education: [x] Review HEP    [] Progressed/Changed HEP based on:   [] positioning   [] body mechanics   [] transfers   [] heat/ice application    [] other:      Other Objective/Functional Measures: FOTO Functional Measure: 52/100                         Pain Level (0-10 scale) post treatment: 0    ASSESSMENT/Changes in Function:     [x]  See Plan of Mandie Joseph 25, DPT 11/2/2021

## 2021-11-04 NOTE — PROGRESS NOTES
Physical Therapy at [facility],   a part of  [facility]  [address] [facility], [address]  Phone: [phone]  Fax: [phone]    Plan of Care/Statement of Necessity for Physical Therapy Services  2-15    Patient name: Daniel Cancer  : 1950  Provider#: [id number]  Referral source: Inga Begum MD      Medical/Treatment Diagnosis: Other abnormal involuntary movements [R25.8]  Other abnormalities of gait and mobility [R26.89]     Prior Hospitalization: see medical history     Comorbidities: PD, chronic back pain, obesity, HTN, bronchitis, high cholesterol, CKD, RA, ulcer of right groin, LBBB, neurogenic bladder, covid-19, A-fib, grade 1 diastolic dysfunction  Prior Level of Function: Prior to diagnosis, patient needed help with bathing and getting up out of the chair, actively going to bingo on  and  and could do stairs  Medications: Verified on Patient Summary List  Start of Care: 19      Onset Date: 2021   The Plan of Care and following information is based on the information from the initial evaluation. Assessment/ key information: Patient presents with high fall risk and fear of falling demonstrated by decreased step length, hypokinesia, and bradykinesia with functional mobility, deficits exacerbated by cognitive demand. Notes difficulty with fine motor tasks including buttoning and zipping as well as gross motor difficulties with brushing her hair, pulling up her pants, and getting into and out of her truck. These tasks are made difficult by bradykinesia, hypokinesia, and R sided RA. She is an excellent candidate for treatment with the LSVT BIG® Protocol to improve gait speed and step length, balance, and overall functional mobility for all ADL's.           Evaluation Complexity History HIGH Complexity :3+ comorbidities / personal factors will impact the outcome/ POC ; Examination MEDIUM Complexity : 3 Standardized tests and measures addressing body structure, function, activity limitation and / or participation in recreation  ;Presentation MEDIUM Complexity : Evolving with changing characteristics  ; Clinical Decision Making MEDIUM Complexity : FOTO score of 26-74  Overall Complexity Rating: MEDIUM    Problem List: pain affecting function, decrease ROM, decrease strength, impaired gait/ balance, decrease ADL/ functional abilitiies, decrease activity tolerance and decrease transfer abilities   Treatment Plan may include any combination of the following: Therapeutic exercise, Therapeutic activities, Neuromuscular re-education, Physical agent/modality, Gait/balance training, Manual therapy, Patient education, Self Care training, Functional mobility training, Home safety training and Stair training  Patient / Family readiness to learn indicated by: asking questions, trying to perform skills and interest  Persons(s) to be included in education: patient (P)  Barriers to Learning/Limitations: None  Patient Goal (s): Being able to use my R hand, comb my hair, and walk more, and drive the truck  Patient Self Reported Health Status: fair  Rehabilitation Potential: excellent    Short Term Goals: To be accomplished in 16 treatments:  1. Pt will be able to arise from a chair without UE assistance to decrease caregiver burden. 2. 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   3. Pt will be able to don her pants without increased time and difficulty to increase independence with dressing. 4. Patient will be able to ambulate 300 feet with independence and appropriate gait mechanics to return to community ambulation.      Patient/ Caregiver education and instruction: self care, activity modification and exercises    [x]  Plan of care has been reviewed with PTA        Certification Period: 11/2/21-2/2/22  Abelina Claude, DPT 11/4/2021     ________________________________________________________________________    I certify that the above Therapy Services are being furnished while the patient is under my care. I agree with the treatment plan and certify that this therapy is necessary.     Physician's Signature:____________________  Date:____________Time: _________         Fariha Giang MD

## 2021-11-29 ENCOUNTER — HOSPITAL ENCOUNTER (OUTPATIENT)
Dept: PHYSICAL THERAPY | Age: 71
Discharge: HOME | End: 2021-11-29
Payer: MEDICARE

## 2021-11-29 PROCEDURE — 97535 SELF CARE MNGMENT TRAINING: CPT

## 2021-11-29 PROCEDURE — 97110 THERAPEUTIC EXERCISES: CPT

## 2021-11-29 NOTE — PROGRESS NOTES
Physical Therapy LSVT BIG DAILY NOTE    Patient Name: Judi Aragon  Date:2021  : 1950  [x]  Patient  Verified  Payor: Jero Hernandez / Plan: [facility] HMO / Product Type: Managed Care Medicare /    In time:12:15 p  Out time:1:35  Total Treatment Time (min): 80  Total Timed Codes (min): 80  1:1 Treatment Time ( [address] only): 80   Visit #:  2    Treatment Area: Other abnormal involuntary movements [R25.8]  Other abnormalities of gait and mobility [R26.89]    SUBJECTIVE  Pain Level (0-10 scale): 0  Any medication changes, allergies to medications, adverse drug reactions, diagnosis change, or new procedure performed?: [x] No    [] Yes (see summary sheet for update)  Subjective functional status/changes:   [] No changes reported  Patient reports that she forgot to bring her functional task recording form but has filled it out.        OBJECTIVE      70 min Therapeutic Exercise:  [x] See flow sheet :   Rationale: increase ROM, increase strength, improve coordination and improve balance to improve the patients ability to ambulate, transfer, perform ADL's    10 min Self Care/Home Management:  []  See flow sheet : instruction on home completion of exercises and carryover assignment, implementation of BIG into everyday activities   Rationale: increase ROM, increase strength, improve coordination and improve balance  to improve the patients ability to perform HEP             With   [] TE   [] TA   [] neuro   [] other: Patient Education: [x] Review HEP    [] Progressed/Changed HEP based on:   [] positioning  [] body mechanics   [] transfers   [] heat/ice application    [] other:      Other Objective/Functional Measures: Able to perform sit to stand on first attempt with forward trunk lean and reach, throughout session required 2-4 attempts      Daily Exercises/Functional Components: 70 minutes    Big Walking/Hierarchy Tasks: 0 minutes    Carryover Assignment: Select space in house to perform HEP, gather chairs and equipment     Pain Level (0-10 scale) post treatment: 0    Level of Perceived Exertion post treatment: 8    ASSESSMENT/Changes in Function: Session attended by daughter who assisted throughout, PT explained minimizing cues to encourage BIG calibration. All exercises performed as modified standing version. Limitations in R shoulder flexion improved with repetition, no increase in pain. Patient required therapeutic rest breaks between exercises and LE stretching. She reported pain in BLE but by end of session reported that she was feeling good, encouraged by improvement in sit to stand transfers. PT provided another functional task recording form, will review and add functional tasks and hierarchy next session. Patient will benefit from skilled PT services to modify and progress therapeutic interventions, address functional mobility deficits, address ROM deficits, address strength deficits, analyze and cue movement patterns, analyze and modify body mechanics/ergonomics, assess and modify postural abnormalities, address imbalance/dizziness and instruct in home and community integration to address rehab goals per plan of care    Progress towards goals / Updated goals:  Educated on HEP and caregiver educated on cueing and assistance for home exercises.      PLAN  []  Upgrade activities as tolerated     [x]  Continue plan of care  []  Update interventions per flow sheet       []  Discharge due to:_  []  Other:_      Susanne Morton DPT 11/29/2021

## 2021-11-30 ENCOUNTER — HOSPITAL ENCOUNTER (OUTPATIENT)
Dept: PHYSICAL THERAPY | Age: 71
Discharge: HOME | End: 2021-11-30
Payer: MEDICARE

## 2021-11-30 PROCEDURE — 97110 THERAPEUTIC EXERCISES: CPT

## 2021-11-30 PROCEDURE — 97116 GAIT TRAINING THERAPY: CPT

## 2021-11-30 NOTE — PROGRESS NOTES
Physical Therapy LSVT BIG DAILY NOTE    Patient Name: Myron Media  Date:2021  : 1950  [x]  Patient  Verified  Payor: [employer]IC[employer] / Plan: [facility] HMO / Product Type: Managed Care Medicare /    In time:11:10 p  Out time:12:10   Total Treatment Time (min): 60  Total Timed Codes (min): 60  1:1 Treatment Time ( [address] only): 60   Visit #:  3    Treatment Area: Other abnormal involuntary movements [R25.8]  Other abnormalities of gait and mobility [R26.89]    SUBJECTIVE  Pain Level (0-10 scale): 0  Any medication changes, allergies to medications, adverse drug reactions, diagnosis change, or new procedure performed?: [x] No    [] Yes (see summary sheet for update)  Subjective functional status/changes:   [] No changes reported  Patient reports that she was a little tired after last session but she felt good. OBJECTIVE      50 min Therapeutic Exercise:  [x] See flow sheet :   Rationale: increase ROM, increase strength, improve coordination and improve balance to improve the patients ability to ambulate, transfer, perform ADL's     10 min Gait Training:  Emphasis on BIG arm swing and increased step length, completed CGA without rollator    Rationale: increase ROM, increase strength, improve coordination and improve balance  to improve the patients ability to ambulate, transfer, perform ADL's             With   [] TE   [] TA   [] neuro   [] other: Patient Education: [x] Review HEP    [] Progressed/Changed HEP based on:   [] positioning  [] body mechanics   [] transfers   [] heat/ice application    [] other:      Other Objective/Functional Measures: Sit to stand with multiple attempts leaving waiting room, not yet calibrated. Improved throughout session, multiple attempts needed toward end when tired.        Daily Exercises/Functional Components: 45 minutes    Big Walking/Hierarchy Tasks: 15 minutes    Carryover Assignment:Gather comb and clothes for exercises, BIG walking to car from clinic    Pain Level (0-10 scale) post treatment: 0    Level of Perceived Exertion post treatment: 6    ASSESSMENT/Changes in Function: Session attended by daughter who assisted throughout. Patient demonstrated excellent exercise recall and improved performance in all daily exercises. Difficulty remains with arm swing throughout, max verbal and tactile cues required for full trunk rotation. BIG walking introduced with continued limited arm swing, R>L. Patient complained that hammer toes was giving her pain with standing exercises, requiring several rest breaks. Selected functional component tasks and hierarchy task to begin next session. Patient will benefit from skilled PT services to modify and progress therapeutic interventions, address functional mobility deficits, address ROM deficits, address strength deficits, analyze and cue movement patterns, analyze and modify body mechanics/ergonomics, assess and modify postural abnormalities, address imbalance/dizziness and instruct in home and community integration to address rehab goals per plan of care    Progress towards goals / Updated goals:  Excellent recall of exercises and improvement in execution, consistency with HEP performance.      PLAN  [x]  Upgrade activities as tolerated     [x]  Continue plan of care  [x]  Update interventions per flow sheet       []  Discharge due to:_  []  Other:_      Romain Sanders DPT 11/30/2021

## 2021-12-01 ENCOUNTER — HOSPITAL ENCOUNTER (OUTPATIENT)
Dept: PHYSICAL THERAPY | Age: 71
Discharge: HOME | End: 2021-12-01
Payer: MEDICARE

## 2021-12-01 PROCEDURE — 97110 THERAPEUTIC EXERCISES: CPT

## 2021-12-01 PROCEDURE — 97116 GAIT TRAINING THERAPY: CPT

## 2021-12-01 NOTE — PROGRESS NOTES
Physical Therapy LSVT BIG DAILY NOTE    Patient Name: Holley Painting  Date:2021  : 1950  [x]  Patient  Verified  Payor: Talya Tellez / Plan: [facility] [address] [facility] HMO / Product Type: Managed Care Medicare /    In time:12:20 p  Out time: 1:20  Total Treatment Time (min): 60  Total Timed Codes (min): 60  1:1 Treatment Time ([facility] only): 60   Visit #:  4    Treatment Area: Other abnormal involuntary movements [R25.8]  Other abnormalities of gait and mobility [R26.89]    SUBJECTIVE  Pain Level (0-10 scale): 0  Any medication changes, allergies to medications, adverse drug reactions, diagnosis change, or new procedure performed?: [x] No    [] Yes (see summary sheet for update)  Subjective functional status/changes:   [] No changes reported  Patient reports that walking out without her rollator was a little difficult last session.        OBJECTIVE      50 min Therapeutic Exercise:  [x] See flow sheet :   Rationale: increase ROM, increase strength, improve coordination and improve balance to improve the patients ability to ambulate, transfer, perform ADL's     10 min Gait Training:  Emphasis on BIG arm swing and increased step length, completed CGA without rollator    Rationale: increase ROM, increase strength, improve coordination and improve balance  to improve the patients ability to ambulate, transfer, perform ADL's             With   [] TE   [] TA   [] neuro   [] other: Patient Education: [x] Review HEP    [] Progressed/Changed HEP based on:   [] positioning  [] body mechanics   [] transfers   [] heat/ice application    [] other:      Other Objective/Functional Measures: Encouraged 1 time for all sit to stands throughout session, improved effort       Daily Exercises/Functional Components: 50 minutes    Big Walking/Hierarchy Tasks: 10 minutes    Carryover Assignment:BIG sit to stand for Maria Victoria to notice     Pain Level (0-10 scale) post treatment: 0    Level of Perceived Exertion post treatment: 9    ASSESSMENT/Changes in Function:     Overall improved execution of daily exercises, able to stay standing throughout both sets of forward step and reach and sideways strep and reach. 2 episodes of cramping in LLE, no reports of R shoulder pain throughout session. Addition of functional component tasks well executed and understood in clinic today, daughter not present to learn for home execution. Patient required max vc's to perform arm swing in gait segment, decreased step length when arm swing improved. Plan to add in hierarchy task of dressing next week, next session to focus on daily exericses at 6 repetitions, functional component tasks, and BIG ambulation of increased duration. Patient will benefit from skilled PT services to modify and progress therapeutic interventions, address functional mobility deficits, address ROM deficits, address strength deficits, analyze and cue movement patterns, analyze and modify body mechanics/ergonomics, assess and modify postural abnormalities, address imbalance/dizziness and instruct in home and community integration to address rehab goals per plan of care    Progress towards goals / Updated goals:  Improvement in performance of all exercises, significant fatigue present by end of standing series.      PLAN  [x]  Upgrade activities as tolerated     [x]  Continue plan of care  [x]  Update interventions per flow sheet       []  Discharge due to:_  []  Other:_      Mal Lerner DPT 12/1/2021

## 2021-12-02 ENCOUNTER — HOSPITAL ENCOUNTER (OUTPATIENT)
Dept: PHYSICAL THERAPY | Age: 71
Discharge: HOME | End: 2021-12-02
Payer: MEDICARE

## 2021-12-02 PROCEDURE — 97110 THERAPEUTIC EXERCISES: CPT

## 2021-12-02 NOTE — PROGRESS NOTES
Physical Therapy at [facility],   a part of [address]  [place]  [facility], [address]  Phone: [phone]      Fax:  [phone]    Progress Note    Name: Andreina Ge   : 1950   MD: Naomie Herrera MD       Treatment Diagnosis: Other abnormal involuntary movements [R25.8]  Other abnormalities of gait and mobility [R26.89]  Start of Care: 21    Visits from Start of Care: 5  Missed Visits: 0    Summary of Care:therapeutic exercise, gait training     Assessment / Recommendations: At time of reassessment, patient has completed her first week of LSVT BIG. She is performing the exercises in standing with a chair for support. She demonstrates good compliance with her HEP but no observable calibration for increased effort outside of sessions. She is able to perform sit to stand without UE assistance on the first attempt roughly 50% of the time and ambulation with increased step length and arm swing with max verbal and tactile cues. She recently developed LLE pain which limited her performance of weightbearing exercises, sx consistent with muscle soreness and plan to monitor/modify protocol prn. She is progressing well along initial POC and will benefit from 12 more sessions to complete LSVT protocol and achieve outstanding goals. 1. Pt will be able to arise from a chair without UE assistance to decrease caregiver burden. MET  2. 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 MET  3. Pt will be able to don her pants without increased time and difficulty to increase independence with dressing. No change, not yet addressed   4. Patient will be able to ambulate 300 feet with independence and appropriate gait mechanics to return to community ambulation.   Progressing toward    Nick Ruiz DPT 12/2/2021

## 2021-12-02 NOTE — PROGRESS NOTES
Physical Therapy LSVT BIG DAILY NOTE    Patient Name: Royal Waterman  Date:2021  : 1950  [x]  Patient  Verified  Payor: Annika Grey / Plan: [facility] HMO / Product Type: Managed Care Medicare /    In time:11:05 a  Out time: 12:05  Total Treatment Time (min): 60  Total Timed Codes (min): 60  1:1 Treatment Time ( [address] only): 60   Visit #:  5    Treatment Area: Other abnormal involuntary movements [R25.8]  Other abnormalities of gait and mobility [R26.89]    SUBJECTIVE  Pain Level (0-10 scale): 8  Any medication changes, allergies to medications, adverse drug reactions, diagnosis change, or new procedure performed?: [x] No    [] Yes (see summary sheet for update)  Subjective functional status/changes:   [] No changes reported  Patient reports that she has pain in her L leg today. She was unable to perform her carryover assignment due to the pain.        OBJECTIVE      55 min Therapeutic Exercise:  [x] See flow sheet :   Rationale: increase ROM, increase strength, improve coordination and improve balance to improve the patients ability to ambulate, transfer, perform ADL's     5 min Gait Training:  Emphasis on BIG arm swing and increased step length, completed CGA without rollator    Rationale: increase ROM, increase strength, improve coordination and improve balance  to improve the patients ability to ambulate, transfer, perform ADL's             With   [] TE   [] TA   [] neuro   [] other: Patient Education: [x] Review HEP    [] Progressed/Changed HEP based on:   [] positioning  [] body mechanics   [] transfers   [] heat/ice application    [] other:      Other Objective/Functional Measures: Pain in L leg objectively consistent with muscle soreness, improved with STM during seated rest breaks  5TSTS: 31 sec, no UE assistance required     Daily Exercises/Functional Components: 55 minutes    Big Walking/Hierarchy Tasks: 5 minutes    Carryover Assignment:   Thursday- BIG sit to stand for Maria Victoria to notice   Friday- BIG walking into doctor's office   Saturday- BIG chair pull out    Sunday- shoulder shake when donning/doffing jacket      Pain Level (0-10 scale) post treatment: 8    Level of Perceived Exertion post treatment: 9    ASSESSMENT/Changes in Function:   Continued good performance of seated sustained exercises, standing exercises with decreased performance 2/2 pain, increased therapeutic rest breaks required. Review of new functional component tasks helpful, jasmyngher in attendance to instruct for home performance. Able to progress reaching exercise with resistance, ready for green band with hair combing task. Decreased performance of BIG walking at end of session, unable to tolerate half the distance of last session or maintain arm swing/ step length. Immediate return to small steps when returned to rollator. Patient will benefit from skilled PT services to modify and progress therapeutic interventions, address functional mobility deficits, address ROM deficits, address strength deficits, analyze and cue movement patterns, analyze and modify body mechanics/ergonomics, assess and modify postural abnormalities, address imbalance/dizziness and instruct in home and community integration to address rehab goals per plan of care    Progress towards goals / Updated goals:  1. Pt will be able to arise from a chair without UE assistance to decrease caregiver burden. MET  2. 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 MET  3. Pt will be able to don her pants without increased time and difficulty to increase independence with dressing. No change, not yet addressed   4. Patient will be able to ambulate 300 feet with independence and appropriate gait mechanics to return to community ambulation.   Progressing toward         PLAN  [x]  Upgrade activities as tolerated     [x]  Continue plan of care  [x]  Update interventions per flow sheet       []  Discharge due to:_  []  Other:_      Susanne Morton DPT 12/2/2021

## 2021-12-06 ENCOUNTER — HOSPITAL ENCOUNTER (OUTPATIENT)
Dept: PHYSICAL THERAPY | Age: 71
Discharge: HOME | End: 2021-12-06
Payer: MEDICARE

## 2021-12-06 PROCEDURE — 97110 THERAPEUTIC EXERCISES: CPT

## 2021-12-06 NOTE — PROGRESS NOTES
Physical Therapy LSVT BIG DAILY NOTE    Patient Name: Nicolette Herrera  Date:2021  : 1950  [x]  Patient  Verified  Payor: Sarah Pineda / Plan: [facility] [address] [facility] HMO / Product Type: Managed Care Medicare /    In time:12:20 p  Out time: 1:15   Total Treatment Time (min): 55  Total Timed Codes (min): 55  1:1 Treatment Time ( [address] only): 55   Visit #:  6    Treatment Area: Other abnormal involuntary movements [R25.8]  Other abnormalities of gait and mobility [R26.89]    SUBJECTIVE  Pain Level (0-10 scale): 9.5  Any medication changes, allergies to medications, adverse drug reactions, diagnosis change, or new procedure performed?: [x] No    [] Yes (see summary sheet for update)  Subjective functional status/changes:   [] No changes reported  Patient reports that she has seen her MD and she has some sort of blockage in her kidneys. She is unsure if the leg pain is kidney related or from the exercises. She has taken pain medication and tried heating and icing her leg which have helped.  She was inconsistent in performance of her HEP or carryover tasks due to pain      OBJECTIVE      55 min Therapeutic Exercise:  [x] See flow sheet :   Rationale: increase ROM, increase strength, improve coordination and improve balance to improve the patients ability to ambulate, transfer, perform ADL's     0 min Gait Training:  Emphasis on BIG arm swing and increased step length, completed CGA without rollator    Rationale: increase ROM, increase strength, improve coordination and improve balance  to improve the patients ability to ambulate, transfer, perform ADL's             With   [] TE   [] TA   [] neuro   [] other: Patient Education: [x] Review HEP    [] Progressed/Changed HEP based on:   [] positioning  [] body mechanics   [] transfers   [] heat/ice application    [] other:      Other Objective/Functional Measures: Modified all standing exercises to seated due to exacerbated L leg pain; standing forward rock and reach only standing exercise  Seated packet provided for HEP    Daily Exercises/Functional Components: 50 minutes    Big Walking/Hierarchy Tasks: 5 minutes    Carryover Assignment:   BIG chair pull out         Pain Level (0-10 scale) post treatment: 9    Level of Perceived Exertion post treatment: 8    ASSESSMENT/Changes in Function:   Seated exercises well tolerated with fewer rest breaks, most difficulty with twisting exercises. Patient continued to note leg pain throughout session. Good tolerance for increased resistance with hair brushing functional component task. Patient will benefit from skilled PT services to modify and progress therapeutic interventions, address functional mobility deficits, address ROM deficits, address strength deficits, analyze and cue movement patterns, analyze and modify body mechanics/ergonomics, assess and modify postural abnormalities, address imbalance/dizziness and instruct in home and community integration to address rehab goals per plan of care    Progress towards goals / Updated goals: No noted progress in function or effort today 2/2 pain. 1. Pt will be able to arise from a chair without UE assistance to decrease caregiver burden. MET  2. 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 MET  3. Pt will be able to don her pants without increased time and difficulty to increase independence with dressing. No change, not yet addressed   4. Patient will be able to ambulate 300 feet with independence and appropriate gait mechanics to return to community ambulation.   Progressing toward         PLAN  [x]  Upgrade activities as tolerated     [x]  Continue plan of care  [x]  Update interventions per flow sheet       []  Discharge due to:_  []  Other:_      Renetta Foley DPT 12/6/2021

## 2021-12-07 ENCOUNTER — HOSPITAL ENCOUNTER (OUTPATIENT)
Dept: PHYSICAL THERAPY | Age: 71
Discharge: HOME | End: 2021-12-07
Payer: MEDICARE

## 2021-12-07 PROCEDURE — 97110 THERAPEUTIC EXERCISES: CPT

## 2021-12-07 PROCEDURE — 97112 NEUROMUSCULAR REEDUCATION: CPT

## 2021-12-07 NOTE — PROGRESS NOTES
Physical Therapy LSVT BIG DAILY NOTE    Patient Name: Franklyn Najjar  Date:2021  : 1950  [x]  Patient  Verified  Payor: Maykel Rubi / Plan: [address]  HMO / Product Type: Managed Care Medicare /    In time:11:05 a  Out time: 12:05   Total Treatment Time (min): 60  Total Timed Codes (min): 60  1:1 Treatment Time ( [address] only): 60   Visit #:  7    Treatment Area: Other abnormal involuntary movements [R25.8]  Other abnormalities of gait and mobility [R26.89]    SUBJECTIVE  Pain Level (0-10 scale): 9  Any medication changes, allergies to medications, adverse drug reactions, diagnosis change, or new procedure performed?: [x] No    [] Yes (see summary sheet for update)  Subjective functional status/changes:   [] No changes reported  Patient reports that she still has leg pain but it is a bit better. She was able to do about 3 of the exercises and her carryover assignment.        OBJECTIVE      30 min Therapeutic Exercise:  [x] See flow sheet :   Rationale: increase ROM, increase strength, improve coordination and improve balance to improve the patients ability to ambulate, transfer, perform ADL's      25 min Neuromuscular Re-education:  [x]  See flow sheet :   Rationale: increase strength, improve coordination, improve balance and increase proprioception  to improve the patients ability to ambulate, transfer, perform ADL's     5 min Gait Training:  Emphasis on BIG arm swing and increased step length, completed CGA without rollator at beginning of session and independent at end of session    Rationale: increase ROM, increase strength, improve coordination and improve balance  to improve the patients ability to ambulate, transfer, perform ADL's             With   [] TE   [] TA   [] neuro   [] other: Patient Education: [x] Review HEP    [] Progressed/Changed HEP based on:   [] positioning  [] body mechanics   [] transfers   [] heat/ice application    [] other:      Other Objective/Functional Measures: Overall reduction in leg pain noted, exercises continued in sitting but able to perform independent BIG walking at end of session  Introduced hierarchy task of donning and doffing jacket with significant decreased time compared to doffing at beginning of session, plan to time next session  Exercises have improved in understanding of task and execution, focus of cues has shifted to effort to promote neuromuscular adaptation     Daily Exercises/Functional Components: 55 minutes    Big Walking/Hierarchy Tasks: 10 minutes     Carryover Assignment:   BIG jacket off and on at her appointment to get blood work done         Pain Level (0-10 scale) post treatment: 8    Level of Perceived Exertion post treatment: 10    ASSESSMENT/Changes in Function:   Improved performance of all exercises noted today. Continued need for constant verbal cues and encouragement, occasional tactile cues for LE positioning. Excellent execution of donning and doffing jacket as introduction to hierarchy task, patient notably excited by improvement in execution. Altered order of exercises additionally improved execution, beginning with rock and reach. Patient additionally demonstrated improved understanding of force production requirements for functional component tasks. Patient will benefit from skilled PT services to modify and progress therapeutic interventions, address functional mobility deficits, address ROM deficits, address strength deficits, analyze and cue movement patterns, analyze and modify body mechanics/ergonomics, assess and modify postural abnormalities, address imbalance/dizziness and instruct in home and community integration to address rehab goals per plan of care    Progress towards goals / Updated goals:   1. Pt will be able to arise from a chair without UE assistance to decrease caregiver burden. MET  2.  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 MET  3. Pt will be able to don her pants without increased time and difficulty to increase independence with dressing. Progressing toward. 4. Patient will be able to ambulate 300 feet with independence and appropriate gait mechanics to return to community ambulation.   Progressing toward         PLAN  [x]  Upgrade activities as tolerated     [x]  Continue plan of care  [x]  Update interventions per flow sheet       []  Discharge due to:_  []  Other:_      Jane Costello, MAR 12/7/2021

## 2021-12-08 ENCOUNTER — HOSPITAL ENCOUNTER (OUTPATIENT)
Dept: PHYSICAL THERAPY | Age: 71
Discharge: HOME | End: 2021-12-08
Payer: MEDICARE

## 2021-12-08 PROCEDURE — 97110 THERAPEUTIC EXERCISES: CPT

## 2021-12-08 PROCEDURE — 97112 NEUROMUSCULAR REEDUCATION: CPT

## 2021-12-08 NOTE — PROGRESS NOTES
Physical Therapy LSVT BIG DAILY NOTE    Patient Name: Holley Painting  Date:2021  : 1950  [x]  Patient  Verified  Payor: Talya Tellez / Plan: [address] [facility] HMO / Product Type: Managed Care Medicare /    In time:11:05 a  Out time: 12:05   Total Treatment Time (min): 60  Total Timed Codes (min): 60  1:1 Treatment Time ( [address] only): 60   Visit #:  8    Treatment Area: Other abnormal involuntary movements [R25.8]  Other abnormalities of gait and mobility [R26.89]    SUBJECTIVE  Pain Level (0-10 scale): 8  Any medication changes, allergies to medications, adverse drug reactions, diagnosis change, or new procedure performed?: [x] No    [] Yes (see summary sheet for update)  Subjective functional status/changes:   [] No changes reported  Patient reports that her L leg is giving her a fit today. OBJECTIVE      30 min Therapeutic Exercise:  [x] See flow sheet :   Rationale: increase ROM, increase strength, improve coordination and improve balance to improve the patients ability to ambulate, transfer, perform ADL's      30 min Neuromuscular Re-education:  [x]  See flow sheet : gait with verbal cues for increased step length, arm swing, posture. Etc. Rationale: increase strength, improve coordination, improve balance and increase proprioception  to improve the patients ability to ambulate, transfer, perform ADL's           With   [] TE   [] TA   [] neuro   [] other: Patient Education: [x] Review HEP    [] Progressed/Changed HEP based on:   [] positioning  [] body mechanics   [] transfers   [] heat/ice application    [] other:      Other Objective/Functional Measures: Able to begin session with 2 laps of BIG walking, one length at end of session with reduction in R>L arm swing 2/2 leg pain and fatigue.     Progression to standing for forward stepping    Daily Exercises/Functional Components: 50 minutes    Big Walking/Hierarchy Tasks: 10 minutes     Carryover Assignment: Complete homework exercises      Pain Level (0-10 scale) post treatment: 8    Level of Perceived Exertion post treatment: 10    ASSESSMENT/Changes in Function:   Continued pain in standing, good execution of seated exercises with improved effort. Multiple attempts to stand throughout session, regression from last weeks function. Discussed utilization of knee brace on L knee to decrease pain. R arm consistently smaller, improvement with verbal cues for ~2 arm swings before returns to reduced excursion. Calibrated to include BIG posture in gait at beginning of session. Patient will benefit from skilled PT services to modify and progress therapeutic interventions, address functional mobility deficits, address ROM deficits, address strength deficits, analyze and cue movement patterns, analyze and modify body mechanics/ergonomics, assess and modify postural abnormalities, address imbalance/dizziness and instruct in home and community integration to address rehab goals per plan of care    Progress towards goals / Updated goals:   1. Pt will be able to arise from a chair without UE assistance to decrease caregiver burden. MET  2. 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 MET  3. Pt will be able to don her pants without increased time and difficulty to increase independence with dressing. Progressing toward. 4. Patient will be able to ambulate 300 feet with independence and appropriate gait mechanics to return to community ambulation.   Progressing toward         PLAN  [x]  Upgrade activities as tolerated     [x]  Continue plan of care  [x]  Update interventions per flow sheet       []  Discharge due to:_  []  Other:_      Shasta Lei DPT 12/8/2021

## 2021-12-09 ENCOUNTER — HOSPITAL ENCOUNTER (OUTPATIENT)
Dept: PHYSICAL THERAPY | Age: 71
Discharge: HOME | End: 2021-12-09
Payer: MEDICARE

## 2021-12-09 PROCEDURE — 97110 THERAPEUTIC EXERCISES: CPT

## 2021-12-09 PROCEDURE — 97112 NEUROMUSCULAR REEDUCATION: CPT

## 2021-12-09 NOTE — PROGRESS NOTES
Physical Therapy LSVT BIG DAILY NOTE    Patient Name: Maciej Turkish  Date:2021  : 1950  [x]  Patient  Verified  Payor: Angie Best / Plan: [facility] [address] [facility] HMO / Product Type: Managed Care Medicare /    In time:11:10 a  Out time: 12:10  Total Treatment Time (min): 60  Total Timed Codes (min): 60  1:1 Treatment Time ( [address] only): 60   Visit #:  9    Treatment Area: Other abnormal involuntary movements [R25.8]  Other abnormalities of gait and mobility [R26.89]    SUBJECTIVE  Pain Level (0-10 scale): 8  Any medication changes, allergies to medications, adverse drug reactions, diagnosis change, or new procedure performed?: [x] No    [] Yes (see summary sheet for update)  Subjective functional status/changes:   [] No changes reported  Patient reports that her L leg is still bothering her and she had a very stressful day yesterday, unable to perform her exercises. OBJECTIVE      30 min Therapeutic Exercise:  [x] See flow sheet :   Rationale: increase ROM, increase strength, improve coordination and improve balance to improve the patients ability to ambulate, transfer, perform ADL's      30 min Neuromuscular Re-education:  [x]  See flow sheet : gait with verbal cues for increased step length, arm swing, posture. Etc. Rationale: increase strength, improve coordination, improve balance and increase proprioception  to improve the patients ability to ambulate, transfer, perform ADL's           With   [] TE   [] TA   [] neuro   [] other: Patient Education: [x] Review HEP    [] Progressed/Changed HEP based on:   [] positioning  [] body mechanics   [] transfers   [] heat/ice application    [] other:      Other Objective/Functional Measures: Max encouragement to perform sit to stands in 1 attempt, able to perfrom 3 reps and several more throughout session.   Requires consistent reinforcement of 1 to stand for performance with cues 10/10 effort, arm swing, strong legs, commit to standing up in one attempt    Daily Exercises/Functional Components: 50 minutes    Big Walking/Hierarchy Tasks: 10 minutes     Carryover Assignment: BIG sit to stand, BIG and forceful placing chips in bingo-like game, BIG reaching to wash herself, BIG sitting during commercials      Pain Level (0-10 scale) post treatment: 6    Level of Perceived Exertion post treatment: 10    ASSESSMENT/Changes in Function:   Improved tolerance for standing throughout session, less mention of pain noted. Unable to progress through all exercises 2/2 long discussion and encouragement regarding sit to stands, improved execution following conversation. Patient was also educated on why homework is important and encouraged to perform HEP over the weekend. Patient will benefit from skilled PT services to modify and progress therapeutic interventions, address functional mobility deficits, address ROM deficits, address strength deficits, analyze and cue movement patterns, analyze and modify body mechanics/ergonomics, assess and modify postural abnormalities, address imbalance/dizziness and instruct in home and community integration to address rehab goals per plan of care    Progress towards goals / Updated goals:   1. Pt will be able to arise from a chair without UE assistance to decrease caregiver burden. MET  2. 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 MET  3. Pt will be able to don her pants without increased time and difficulty to increase independence with dressing. Progressing toward. 4. Patient will be able to ambulate 300 feet with independence and appropriate gait mechanics to return to community ambulation.   Progressing toward         PLAN  [x]  Upgrade activities as tolerated     [x]  Continue plan of care  [x]  Update interventions per flow sheet       []  Discharge due to:_  [] Other:_      Jane Costello, DPT 12/9/2021

## 2021-12-13 ENCOUNTER — HOSPITAL ENCOUNTER (OUTPATIENT)
Dept: PHYSICAL THERAPY | Age: 71
Discharge: HOME | End: 2021-12-13
Payer: MEDICARE

## 2021-12-13 PROCEDURE — 97112 NEUROMUSCULAR REEDUCATION: CPT

## 2021-12-13 PROCEDURE — 97110 THERAPEUTIC EXERCISES: CPT

## 2021-12-13 NOTE — PROGRESS NOTES
Physical Therapy [facility] BIG DAILY NOTE    Patient Name: Reji Al  Date:2021  : 1950  [x]  Patient  Verified  Payor: BLUE CROSS MEDICARE / Plan: [address]  HMO / Product Type: Managed Care Medicare /    In time:12:20 p  Out time: 1:20  Total Treatment Time (min): 60  Total Timed Codes (min): 60  1:1 Treatment Time ( [address] only): 60   Visit #:  10    Treatment Area: Other abnormal involuntary movements [R25.8]  Other abnormalities of gait and mobility [R26.89]    SUBJECTIVE  Pain Level (0-10 scale): 6  Any medication changes, allergies to medications, adverse drug reactions, diagnosis change, or new procedure performed?: [x] No    [] Yes (see summary sheet for update)  Subjective functional status/changes:   [] No changes reported  Patient tearful at beginning of session. \"I just don't feel good today, my leg is hurting and I'm frustrated about my phone and I got this fluid pill that makes me wet myself. I've had to change 3 times today. \"  She was able to perform her carryover assignment of standing up BIG from her living room chair but notes its more difficulty as the chair is squishy. She noted some improvement in reaching while bathing but still unable to \"really wash. \" She remains inconsistent in performance of exercises. \"I did some of them. \"       OBJECTIVE      30 min Therapeutic Exercise:  [x] See flow sheet :   Rationale: increase ROM, increase strength, improve coordination and improve balance to improve the patients ability to ambulate, transfer, perform ADL's      30 min Neuromuscular Re-education:  [x]  See flow sheet : gait with verbal cues for increased step length, arm swing, posture. Etc.     Rationale: increase strength, improve coordination, improve balance and increase proprioception  to improve the patients ability to ambulate, transfer, perform ADL's           With   [] TE   [] TA   [] neuro   [] other: Patient Education: [x] Review HEP    [] Progressed/Changed HEP based on:   [] positioning  [] body mechanics   [] transfers   [] heat/ice application    [] other:      Other Objective/Functional Measures: Able to perform 5 sit to stand on the first attempt and carried throughout session without cues. Tactile and verbal cues for R arm swing posteriorly in ambulation with improved execution/excursion/effort. Added \"washing\" to reaching task, continues to require intermittent verbal cues for effort on functional component tasks. Improved tolerance for weightbearing throughout session. Daily Exercises/Functional Components: 45 minutes     Big Walking/Hierarchy Tasks: 15 minutes     Carryover Assignment: BIG hair brushing      Pain Level (0-10 scale) post treatment: 6    Level of Perceived Exertion post treatment: 10    ASSESSMENT/Changes in Function:   Able to perform sit to stand 5x in a row first attempt. Patient expressed not wanting to perform hair brushing exercise as she attempted brushing her hair last night and felt belittled as her caregivers accused her of not trying. Patient was encouraged to try the brushing again tonight as it will be easier without the kinks to build confidence, as well as to bring hairbrush with her next session. Patient will benefit from skilled PT services to modify and progress therapeutic interventions, address functional mobility deficits, address ROM deficits, address strength deficits, analyze and cue movement patterns, analyze and modify body mechanics/ergonomics, assess and modify postural abnormalities, address imbalance/dizziness and instruct in home and community integration to address rehab goals per plan of care    Progress towards goals / Updated goals:   1. Pt will be able to arise from a chair without UE assistance to decrease caregiver burden. MET  2.  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 Inconsistent  3. Pt will be able to don her pants without increased time and difficulty to increase independence with dressing. Progressing toward. 4. Patient will be able to ambulate 300 feet with independence and appropriate gait mechanics to return to community ambulation.   Progressing toward         PLAN  [x]  Upgrade activities as tolerated     [x]  Continue plan of care  [x]  Update interventions per flow sheet       []  Discharge due to:_  []  Other:_      Renetta Foley DPT 12/13/2021

## 2021-12-14 ENCOUNTER — HOSPITAL ENCOUNTER (OUTPATIENT)
Dept: PHYSICAL THERAPY | Age: 71
Discharge: HOME | End: 2021-12-14
Payer: MEDICARE

## 2021-12-14 PROCEDURE — 97112 NEUROMUSCULAR REEDUCATION: CPT

## 2021-12-14 PROCEDURE — 97110 THERAPEUTIC EXERCISES: CPT

## 2021-12-14 NOTE — PROGRESS NOTES
Physical Therapy [facility] BIG DAILY NOTE    Patient Name: Carie Myrick  Date:2021  : 1950  [x]  Patient  Verified  Payor: [employer] / Plan: [address] [facility] HMO / Product Type: Managed Care Medicare /    In time:11:20 a  Out time: 12:15 pm  Total Treatment Time (min): 55  Total Timed Codes (min): 55  1:1 Treatment Time ( [address] only): 55  Visit #:  11    Treatment Area: Other abnormal involuntary movements [R25.8]  Other abnormalities of gait and mobility [R26.89]    SUBJECTIVE  Pain Level (0-10 scale): 4  Any medication changes, allergies to medications, adverse drug reactions, diagnosis change, or new procedure performed?: [x] No    [] Yes (see summary sheet for update)  Subjective functional status/changes:   [] No changes reported  \"Doin pretty good. \" Patient reports that she tried brushing her hair last night but just can't do it. OBJECTIVE      35 min Therapeutic Exercise:  [x] See flow sheet :   Rationale: increase ROM, increase strength, improve coordination and improve balance to improve the patients ability to ambulate, transfer, perform ADL's      30 min Neuromuscular Re-education:  [x]  See flow sheet : gait with verbal cues for increased step length, arm swing, posture. Etc. Rationale: increase strength, improve coordination, improve balance and increase proprioception  to improve the patients ability to ambulate, transfer, perform ADL's           With   [] TE   [] TA   [] neuro   [] other: Patient Education: [x] Review HEP    [] Progressed/Changed HEP based on:   [] positioning  [] body mechanics   [] transfers   [] heat/ice application    [] other:      Other Objective/Functional Measures: Able to brush hair with cues for increased push against head and mentality shift.    Pain in groin with sustained reaching exercise, withheld     25 seconds doffing jacket, 27 seconds trial with distraction   43 seconds donning jacket, 1:04 seconds trial difficulty with zipper    Cues for larger grasp with R hand aligning zipper        Daily Exercises/Functional Components: 40 minutes     Big Walking/Hierarchy Tasks: 15 minutes     Carryover Assignment: BIG sit to stand from soft chair in living room      Pain Level (0-10 scale) post treatment: 6    Level of Perceived Exertion post treatment: 10    ASSESSMENT/Changes in Function:   No change in tolerance for standing, her daughter is getting her a knee brace tonight. Improved execution of hair brushing exercise, increased confidence. Patient will benefit from skilled PT services to modify and progress therapeutic interventions, address functional mobility deficits, address ROM deficits, address strength deficits, analyze and cue movement patterns, analyze and modify body mechanics/ergonomics, assess and modify postural abnormalities, address imbalance/dizziness and instruct in home and community integration to address rehab goals per plan of care    Progress towards goals / Updated goals:   1. Pt will be able to arise from a chair without UE assistance to decrease caregiver burden. MET  2. 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 Inconsistent  3. Pt will be able to don her pants without increased time and difficulty to increase independence with dressing. Progressing toward. 4. Patient will be able to ambulate 300 feet with independence and appropriate gait mechanics to return to community ambulation.   Progressing toward         PLAN  [x]  Upgrade activities as tolerated     [x]  Continue plan of care  [x]  Update interventions per flow sheet       []  Discharge due to:_  []  Other:_      Kishor Daniel DPT 12/14/2021

## 2021-12-15 ENCOUNTER — HOSPITAL ENCOUNTER (OUTPATIENT)
Dept: PHYSICAL THERAPY | Age: 71
Discharge: HOME | End: 2021-12-15
Payer: MEDICARE

## 2021-12-15 PROCEDURE — 97112 NEUROMUSCULAR REEDUCATION: CPT

## 2021-12-15 PROCEDURE — 97110 THERAPEUTIC EXERCISES: CPT

## 2021-12-15 NOTE — PROGRESS NOTES
Physical Therapy [facility] BIG DAILY NOTE    Patient Name: Nasim Mckeon  Date:12/15/2021  : 1950  [x]  Patient  Verified  Payor: BLUE CROSS MEDICARE / Plan: [facility] [address]  HMO / Product Type: Managed Care Medicare /    In time:12:20 p  Out time: 1:20 pm  Total Treatment Time (min): 60  Total Timed Codes (min): 60  1:1 Treatment Time ( [address] only): 60  Visit #:  12    Treatment Area: Other abnormal involuntary movements [R25.8]  Other abnormalities of gait and mobility [R26.89]    SUBJECTIVE  Pain Level (0-10 scale): 4  Any medication changes, allergies to medications, adverse drug reactions, diagnosis change, or new procedure performed?: [x] No    [] Yes (see summary sheet for update)  Subjective functional status/changes:   [] No changes reported  Patient reports she was able to stand up from her soft chair in the living room but it was much harder. \"I did it about 4 times. I was able to brush my hair too. I dressed myself too, only thing I couldn't do was my shoes\" She received a knee brace but it was too tight so she's going to be getting another. OBJECTIVE      30 min Therapeutic Exercise:  [x] See flow sheet :   Rationale: increase ROM, increase strength, improve coordination and improve balance to improve the patients ability to ambulate, transfer, perform ADL's      30 min Neuromuscular Re-education:  [x]  See flow sheet : gait with verbal cues for increased step length, arm swing, posture. Etc.     Rationale: increase strength, improve coordination, improve balance and increase proprioception  to improve the patients ability to ambulate, transfer, perform ADL's           With   [] TE   [] TA   [] neuro   [] other: Patient Education: [x] Review HEP    [] Progressed/Changed HEP based on:   [] positioning  [] body mechanics   [] transfers   [] heat/ice application    [] other:      Other Objective/Functional Measures: 3 lengths BIG walking, good R arm swing without cues diminished with fatigue   Flicks added, performed more as clasping, temporarily corrected with verbal and visual cues but quickly reverted, poor execution on R  Able to progress forward step and reach to unsupported, one minor retro LOB with good recovery, to L but not R side stepping  Attempt at rock and reach without support, fearful and B arm swing in unison despite cues       22 seconds doffing    52 seconds donning- working on zipper with big grab on R and forceful alignment on L  Cues for larger grasp with R hand aligning zipper        Daily Exercises/Functional Components: 40 minutes     Big Walking/Hierarchy Tasks: 20 minutes     Carryover Assignment: BIG grocery putting away       Pain Level (0-10 scale) post treatment: 4    Level of Perceived Exertion post treatment: 10    ASSESSMENT/Changes in Function:   Pt eager to brush her hair. Demonstrates improved endurance for ambulation at beginning of session and R arm swing without cuing. Patient will benefit from skilled PT services to modify and progress therapeutic interventions, address functional mobility deficits, address ROM deficits, address strength deficits, analyze and cue movement patterns, analyze and modify body mechanics/ergonomics, assess and modify postural abnormalities, address imbalance/dizziness and instruct in home and community integration to address rehab goals per plan of care    Progress towards goals / Updated goals:   1. Pt will be able to arise from a chair without UE assistance to decrease caregiver burden. MET  2. 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 Inconsistent  3. Pt will be able to don her pants without increased time and difficulty to increase independence with dressing. Progressing toward.   4. Patient will be able to ambulate 300 feet with independence and appropriate gait mechanics to return to community ambulation.   Progressing toward         PLAN  [x]  Upgrade activities as tolerated     [x]  Continue plan of care  [x]  Update interventions per flow sheet       []  Discharge due to:_  []  Other:_      Yesenia Zuleta DPT 12/15/2021

## 2021-12-16 ENCOUNTER — HOSPITAL ENCOUNTER (OUTPATIENT)
Dept: PHYSICAL THERAPY | Age: 71
Discharge: HOME | End: 2021-12-16
Payer: MEDICARE

## 2021-12-16 PROCEDURE — 97112 NEUROMUSCULAR REEDUCATION: CPT

## 2021-12-16 PROCEDURE — 97110 THERAPEUTIC EXERCISES: CPT

## 2021-12-16 NOTE — PROGRESS NOTES
Physical Therapy [facility] BIG DAILY NOTE    Patient Name: Mac Regalado  Date:2021  : 1950  [x]  Patient  Verified  Payor: BLUE CROSS MEDICARE / Plan: [facility] HMO / Product Type: Managed Care Medicare /    In time:11:15 a  Out time: 12:05 pm  Total Treatment Time (min): 50  Total Timed Codes (min): 50  1:1 Treatment Time ([facility] only): 50  Visit #:  13    Treatment Area: Other abnormal involuntary movements [R25.8]  Other abnormalities of gait and mobility [R26.89]    SUBJECTIVE  Pain Level (0-10 scale): 4  Any medication changes, allergies to medications, adverse drug reactions, diagnosis change, or new procedure performed?: [x] No    [] Yes (see summary sheet for update)  Subjective functional status/changes:   [] No changes reported  Patient reports she is late as she was at an MD appt. She is unclear in her description of the diagnosis she recieved but noted a peripheral deficiency to her kidney or her L leg which is suspected of causing her pain. She is scheduled to see the MD again on the . OBJECTIVE      25 min Therapeutic Exercise:  [x] See flow sheet :   Rationale: increase ROM, increase strength, improve coordination and improve balance to improve the patients ability to ambulate, transfer, perform ADL's      25 min Neuromuscular Re-education:  [x]  See flow sheet : gait with verbal cues for increased step length, arm swing, posture. Etc.     Rationale: increase strength, improve coordination, improve balance and increase proprioception  to improve the patients ability to ambulate, transfer, perform ADL's           With   [] TE   [] TA   [] neuro   [] other: Patient Education: [x] Review HEP    [] Progressed/Changed HEP based on:   [] positioning  [] body mechanics   [] transfers   [] heat/ice application    [] other:      Other Objective/Functional Measures: 2 lengths BIG walking, less R arm swing noted compared to yesterday  BIG hand for side stepping with support  Backward step with improvement in return to erect posture with cues, continued need for verbal cues for R arm extension   Improved performance of sideways rock and reach with encouragement and neuro plasticity education   Overall decreased effort into R arm even with verbal and tactile cues         Daily Exercises/Functional Components: 40 minutes     Big Walking/Hierarchy Tasks: 10 minutes     Carryover Assignment: BIG brushing your hair, BIG standing up and sitting down, BIG bath, BIG walking        Pain Level (0-10 scale) post treatment: 4    Level of Perceived Exertion post treatment: 10    ASSESSMENT/Changes in Function:   Patient less engaged throughout session due to recent diagnosis. Session conducted with assumption of PVD, activity to sx onset followed by seated rest.      Patient will benefit from skilled PT services to modify and progress therapeutic interventions, address functional mobility deficits, address ROM deficits, address strength deficits, analyze and cue movement patterns, analyze and modify body mechanics/ergonomics, assess and modify postural abnormalities, address imbalance/dizziness and instruct in home and community integration to address rehab goals per plan of care    Progress towards goals / Updated goals:   1. Pt will be able to arise from a chair without UE assistance to decrease caregiver burden. MET  2. 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 Inconsistent  3. Pt will be able to don her pants without increased time and difficulty to increase independence with dressing. Progressing toward. 4. Patient will be able to ambulate 300 feet with independence and appropriate gait mechanics to return to community ambulation.   Progressing toward         PLAN  [x]  Upgrade activities as tolerated     [x]  Continue plan of care  [x] Update interventions per flow sheet       []  Discharge due to:_  []  Other:_      Jane Costello, DPT 12/16/2021

## 2021-12-20 ENCOUNTER — HOSPITAL ENCOUNTER (OUTPATIENT)
Dept: PHYSICAL THERAPY | Age: 71
Discharge: HOME | End: 2021-12-20
Payer: MEDICARE

## 2021-12-20 PROCEDURE — 97112 NEUROMUSCULAR REEDUCATION: CPT

## 2021-12-20 PROCEDURE — 97110 THERAPEUTIC EXERCISES: CPT

## 2021-12-20 NOTE — PROGRESS NOTES
Physical Therapy [facility] BIG DAILY NOTE    Patient Name: Brian Roy  Date:2021  : 1950  [x]  Patient  Verified  Payor: BLUE CROSS MEDICARE / Plan: [facility] HMO / Product Type: Managed Care Medicare /    In time: 12:25 pm Out time: 1:25 pm  Total Treatment Time (min): 60  Total Timed Codes (min): 60  1:1 Treatment Time ( [address] only): 60  Visit #:  14    Treatment Area: Other abnormal involuntary movements [R25.8]  Other abnormalities of gait and mobility [R26.89]    SUBJECTIVE  Pain Level (0-10 scale): \"pretty good\"   Any medication changes, allergies to medications, adverse drug reactions, diagnosis change, or new procedure performed?: [x] No    [] Yes (see summary sheet for update)  Subjective functional status/changes:   [] No changes reported  Patient reports that she went to the kidney MD this morning, \"Now I gotta see someone on the  and go from there. They may have to do a vein or something. \"   She reports that she attempted bathing over the weekend. \"I did some. It went a little better. \"      OBJECTIVE      30 min Therapeutic Exercise:  [x] See flow sheet :   Rationale: increase ROM, increase strength, improve coordination and improve balance to improve the patients ability to ambulate, transfer, perform ADL's      30 min Neuromuscular Re-education:  [x]  See flow sheet : gait with verbal cues for increased step length, arm swing, posture. Etc.     Rationale: increase strength, improve coordination, improve balance and increase proprioception  to improve the patients ability to ambulate, transfer, perform ADL's           With   [] TE   [] TA   [] neuro   [] other: Patient Education: [x] Review HEP    [] Progressed/Changed HEP based on:   [] positioning  [] body mechanics   [] transfers   [] heat/ice application    [] other:      Other Objective/Functional Measures: 3.5 laps BIG walking, improvement in step length and R arm swing   Multiple attempts for 5vsit to stands in a row, loss of rocking only sustained trunk lean noted   Progression to BTB for functional component tasks    Daily Exercises/Functional Components: 45 minutes     Big Walking/Hierarchy Tasks: 15 minutes     Carryover Assignment: BIG chair pull out        Pain Level (0-10 scale) post treatment: 4    Level of Perceived Exertion post treatment: 10    ASSESSMENT/Changes in Function:   Overall improvement in effort noted throughout session, increased tolerance for weightbearing. Noted calibration with donning jacket at end of session, patient noting she did not have to think about it. Patient will benefit from skilled PT services to modify and progress therapeutic interventions, address functional mobility deficits, address ROM deficits, address strength deficits, analyze and cue movement patterns, analyze and modify body mechanics/ergonomics, assess and modify postural abnormalities, address imbalance/dizziness and instruct in home and community integration to address rehab goals per plan of care    Progress towards goals / Updated goals:   1. Pt will be able to arise from a chair without UE assistance to decrease caregiver burden. MET  2. 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 Inconsistent  3. Pt will be able to don her pants without increased time and difficulty to increase independence with dressing. Progressing toward. 4. Patient will be able to ambulate 300 feet with independence and appropriate gait mechanics to return to community ambulation.   Progressing toward         PLAN  [x]  Upgrade activities as tolerated     [x]  Continue plan of care  [x]  Update interventions per flow sheet       []  Discharge due to:_  []  Other:_      Criselda Corea DPT 12/20/2021

## 2021-12-21 ENCOUNTER — HOSPITAL ENCOUNTER (OUTPATIENT)
Dept: PHYSICAL THERAPY | Age: 71
Discharge: HOME | End: 2021-12-21
Payer: MEDICARE

## 2021-12-21 PROCEDURE — 97110 THERAPEUTIC EXERCISES: CPT

## 2021-12-21 PROCEDURE — 97112 NEUROMUSCULAR REEDUCATION: CPT

## 2021-12-21 NOTE — PROGRESS NOTES
Physical Therapy [facility] BIG DAILY NOTE    Patient Name: Nasim Mckeon  Date:2021  : 1950  [x]  Patient  Verified  Payor: Carlos Enrique Choudhary / Plan: [address]  HMO / Product Type: Managed Care Medicare /    In time: 11:10 am Out time: 12:05 pm  Total Treatment Time (min): 55  Total Timed Codes (min): 55  1:1 Treatment Time ( [address] only): 55  Visit #:  15    Treatment Area: Other abnormal involuntary movements [R25.8]  Other abnormalities of gait and mobility [R26.89]    SUBJECTIVE  Pain Level (0-10 scale): \"pretty good\"   Any medication changes, allergies to medications, adverse drug reactions, diagnosis change, or new procedure performed?: [x] No    [] Yes (see summary sheet for update)  Subjective functional status/changes:   [] No changes reported  Patient reports that the carpet in her dining room makes the chair pulling out difficult. She was picking 2 bags of greens. OBJECTIVE      25 min Therapeutic Exercise:  [x] See flow sheet :   Rationale: increase ROM, increase strength, improve coordination and improve balance to improve the patients ability to ambulate, transfer, perform ADL's      30 min Neuromuscular Re-education:  [x]  See flow sheet : gait with verbal cues for increased step length, arm swing, posture. Etc.     Rationale: increase strength, improve coordination, improve balance and increase proprioception  to improve the patients ability to ambulate, transfer, perform ADL's           With   [] TE   [] TA   [] neuro   [] other: Patient Education: [x] Review HEP    [] Progressed/Changed HEP based on:   [] positioning  [] body mechanics   [] transfers   [] heat/ice application    [] other:      Other Objective/Functional Measures: 3.5 laps BIG walking, improvement in step length and R arm swing even with cognitive challenge  Requires continued encouragement and cues for arm swing to perform sit to stand, tendency to rock once prior to stand        Daily Exercises/Functional Components: 40 minutes     Big Walking/Hierarchy Tasks: 15 minutes     Carryover Assignment: Every sit to  1 rep     Pain Level (0-10 scale) post treatment: 4    Level of Perceived Exertion post treatment: 10    ASSESSMENT/Changes in Function:   Improvement in R arm swing with ambulation, decreased effort by end of session due to fatigue. Patient will benefit from skilled PT services to modify and progress therapeutic interventions, address functional mobility deficits, address ROM deficits, address strength deficits, analyze and cue movement patterns, analyze and modify body mechanics/ergonomics, assess and modify postural abnormalities, address imbalance/dizziness and instruct in home and community integration to address rehab goals per plan of care    Progress towards goals / Updated goals:   1. Pt will be able to arise from a chair without UE assistance to decrease caregiver burden. MET  2. 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 Inconsistent  3. Pt will be able to don her pants without increased time and difficulty to increase independence with dressing. Progressing toward. 4. Patient will be able to ambulate 300 feet with independence and appropriate gait mechanics to return to community ambulation.   Progressing toward         PLAN  [x]  Upgrade activities as tolerated     [x]  Continue plan of care  [x]  Update interventions per flow sheet       []  Discharge due to:_  []  Other:_      Jaquelin Russell DPT 12/21/2021

## 2021-12-22 ENCOUNTER — HOSPITAL ENCOUNTER (OUTPATIENT)
Dept: PHYSICAL THERAPY | Age: 71
Discharge: HOME | End: 2021-12-22
Payer: MEDICARE

## 2021-12-22 PROCEDURE — 97110 THERAPEUTIC EXERCISES: CPT

## 2021-12-22 PROCEDURE — 97112 NEUROMUSCULAR REEDUCATION: CPT

## 2021-12-22 NOTE — PROGRESS NOTES
Physical Therapy LSVT BIG DAILY NOTE    Patient Name: Maximo Castro  Date:2021  : 1950  [x]  Patient  Verified  Payor: [employer] / Plan: [address]  HMO / Product Type: Managed Care Medicare /    In time: 12:17 pm Out time: 1:15 pm  Total Treatment Time (min): 58  Total Timed Codes (min): 58  1:1 Treatment Time ([facility] only): 58  Visit #:  16    Treatment Area: Other abnormal involuntary movements [R25.8]  Other abnormalities of gait and mobility [R26.89]    SUBJECTIVE  Pain Level (0-10 scale): \"pretty good\"   Any medication changes, allergies to medications, adverse drug reactions, diagnosis change, or new procedure performed?: [x] No    [] Yes (see summary sheet for update)  Subjective functional status/changes:   [] No changes reported  Patient reports that she was christy to pull her chair out in one pull and do some dishes last night. OBJECTIVE      28 min Therapeutic Exercise:  [x] See flow sheet :   Rationale: increase ROM, increase strength, improve coordination and improve balance to improve the patients ability to ambulate, transfer, perform ADL's      30 min Neuromuscular Re-education:  [x]  See flow sheet : gait with verbal cues for increased step length, arm swing, posture. Etc. Rationale: increase strength, improve coordination, improve balance and increase proprioception  to improve the patients ability to ambulate, transfer, perform ADL's           With   [] TE   [] TA   [] neuro   [] other: Patient Education: [x] Review HEP    [] Progressed/Changed HEP based on:   [] positioning  [] body mechanics   [] transfers   [] heat/ice application    [] other:      Other Objective/Functional Measures: 3 lengths BIG walking to start,  3 laps with cognitive challenge with increased difficulty swinging R arm. 270 feet without rest, intermittent cues for increased R arm swing.    Good tolerance for additon of 5# weight to sit to stand, 5 performed without and 2 with   Overall improved effort noted, decreased rest breaks and no complaints of leg pain     Daily Exercises/Functional Components: 43 minutes     Big Walking/Hierarchy Tasks: 15 minutes     Carryover Assignment: BIG hair brushing tonight      Pain Level (0-10 scale) post treatment: 4    Level of Perceived Exertion post treatment: 10    ASSESSMENT/Changes in Function:   Best session in terms of effort and task execution, on track for d/c tomorrow. PT and patient discussed steps following discharge, plan to reiterate and answer questions tomorrow. Patient will benefit from skilled PT services to modify and progress therapeutic interventions, address functional mobility deficits, address ROM deficits, address strength deficits, analyze and cue movement patterns, analyze and modify body mechanics/ergonomics, assess and modify postural abnormalities, address imbalance/dizziness and instruct in home and community integration to address rehab goals per plan of care    Progress towards goals / Updated goals:   1. Pt will be able to arise from a chair without UE assistance to decrease caregiver burden. MET  2. 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 Inconsistent  3. Pt will be able to don her pants without increased time and difficulty to increase independence with dressing. Progressing toward. 4. Patient will be able to ambulate 300 feet with independence and appropriate gait mechanics to return to community ambulation.   Progressing toward         PLAN  [x]  Upgrade activities as tolerated     [x]  Continue plan of care  [x]  Update interventions per flow sheet       []  Discharge due to:_  []  Other:_      Renetta Foley DPT 12/22/2021

## 2021-12-23 ENCOUNTER — HOSPITAL ENCOUNTER (OUTPATIENT)
Dept: PHYSICAL THERAPY | Age: 71
Discharge: HOME | End: 2021-12-23
Payer: MEDICARE

## 2021-12-23 PROCEDURE — 97110 THERAPEUTIC EXERCISES: CPT

## 2021-12-23 PROCEDURE — 97112 NEUROMUSCULAR REEDUCATION: CPT

## 2021-12-23 NOTE — PROGRESS NOTES
Physical Therapy LSVT BIG DAILY NOTE    Patient Name: Ashley Pressley  Date:2021  : 1950  [x]  Patient  Verified  Payor: BLUE [employer] MEDICARE / Plan: [facility] HMO / Product Type: Managed Care Medicare /    In time: 11:10 am Out time: 12:05 pm  Total Treatment Time (min): 55  Total Timed Codes (min): 55  1:1 Treatment Time ( [address] only): 55  Visit #:  17    Treatment Area: Other abnormal involuntary movements [R25.8]  Other abnormalities of gait and mobility [R26.89]    SUBJECTIVE  Pain Level (0-10 scale): \"fine\"   Any medication changes, allergies to medications, adverse drug reactions, diagnosis change, or new procedure performed?: [x] No    [] Yes (see summary sheet for update)  Subjective functional status/changes:   [] No changes reported  Patient reports that she did a lot of BIG walking around her house and was able to do some of the exercises at home. OBJECTIVE      30 min Therapeutic Exercise:  [x] See flow sheet :   Rationale: increase ROM, increase strength, improve coordination and improve balance to improve the patients ability to ambulate, transfer, perform ADL's      25 min Neuromuscular Re-education:  [x]  See flow sheet : gait with verbal cues for increased step length, arm swing, posture. Etc. Rationale: increase strength, improve coordination, improve balance and increase proprioception  to improve the patients ability to ambulate, transfer, perform ADL's           With   [] TE   [] TA   [] neuro   [] other: Patient Education: [x] Review HEP    [] Progressed/Changed HEP based on:   [] positioning  [] body mechanics   [] transfers   [] heat/ice application    [] other:      Other Objective/Functional Measures:      Visual feedback for BIG walking and difference in arm swing, increased understanding of increased effort required on R side. Reviewed all exercises at 4 reps to demonstrate home performance, patient seemed encouraged to perform HEP. 5TSTS: 21. 42 without UE assistance     FOTO: 56, inconsistency in person answering questions     TU seconds   Manual: 21 seconds   Cognitive: 18 seconds - significant reduced R arm swing     Daily Exercises/Functional Components: 35 minutes     Big Walking/Hierarchy Tasks: 20 minutes     Carryover Assignment:      Pain Level (0-10 scale) post treatment: \"fine\"    Level of Perceived Exertion post treatment: 10    ASSESSMENT/Changes in Function:   See discharge note. Progress towards goals / Updated goals:   1. Pt will be able to arise from a chair without UE assistance to decrease caregiver burden. MET  2. 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 Inconsistent but improved   3. Pt will be able to don her pants without increased time and difficulty to increase independence with dressing. Changed to jacket- MET   4. Patient will be able to ambulate 300 feet with independence and appropriate gait mechanics to return to community ambulation.   MET with intermittent cues for arm swing         PLAN  []  Upgrade activities as tolerated     []  Continue plan of care  []  Update interventions per flow sheet       [x]  Discharge due to: protocol finished, goals met   []  Other:_      Kathie Spring DPT 2021

## 2021-12-27 NOTE — PROGRESS NOTES
Physical Therapy at [facility],   a part of  [facility]  [address] [facility]  [place], [address]  Phone: [phone] Fax: [phone]      Discharge Summary 2-15    Patient name: Dottie Granado  : 1950  Provider#: [id number]  Referral source: Arely Mclaughlin MD      Medical/Treatment Diagnosis: Other abnormal involuntary movements [R25.8]  Other abnormalities of gait and mobility [R26.89]     Prior Hospitalization: see medical history     Comorbidities: See Plan of Care  Prior Level of Function: See Plan of Care  Medications: Verified on Patient Summary List    Start of Care: 21      Onset Date:   Visits from Start of Care: 17     Missed Visits: 0  Reporting Period : 21 to 21    Assessment/Summary of care: At time of discharge patient has improved gait mechanics, safety, and endurance for independent ambulation. She improved her 5 time sit to stand test to performance without UE assistance in the same time frame as previously performed with arm rest push off. Standard TUG improved by 5 seconds, TUG with a manual task of carrying water improved by 19 seconds, and TUG with a cognitive task improved by 20 seconds. She has been instructed on the importance of continuing with her HEP to maintain progress and verbalized understanding. Plan to follow up in roughly 3 months to check in on status. 1. Pt will be able to arise from a chair without UE assistance to decrease caregiver burden. MET  2. 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 Inconsistent but improved   3. Pt will be able to don her pants without increased time and difficulty to increase independence with dressing. Changed to donning jacket- MET   4.  Patient will be able to ambulate 300 feet with independence and appropriate gait mechanics to return to community ambulation.   MET with intermittent cues for R arm swing          RECOMMENDATIONS:  [x]Discontinue therapy: [x]Patient has reached or is progressing toward set goals     []Patient is non-compliant or has abdicated     []Due to lack of appreciable progress towards set goals     []Other  Mal Lerner DPT 12/27/2021

## 2022-01-12 RX ORDER — CARBIDOPA 25 MG/1
25 TABLET ORAL 3 TIMES DAILY
Qty: 270 TABLET | Refills: 1 | Status: SHIPPED | OUTPATIENT
Start: 2022-01-12 | End: 2022-06-06

## 2022-01-12 RX ORDER — CETIRIZINE HYDROCHLORIDE 10 MG/1
TABLET, FILM COATED ORAL
Qty: 90 TABLET | Refills: 1 | Status: SHIPPED | OUTPATIENT
Start: 2022-01-12

## 2022-01-31 ENCOUNTER — OFFICE VISIT (OUTPATIENT)
Dept: NEUROLOGY | Age: 72
End: 2022-01-31
Payer: MEDICARE

## 2022-01-31 VITALS
RESPIRATION RATE: 18 BRPM | WEIGHT: 196 LBS | BODY MASS INDEX: 33.46 KG/M2 | DIASTOLIC BLOOD PRESSURE: 78 MMHG | HEIGHT: 64 IN | SYSTOLIC BLOOD PRESSURE: 138 MMHG | HEART RATE: 72 BPM

## 2022-01-31 DIAGNOSIS — G24.9 DYSKINESIA DUE TO PARKINSON'S DISEASE (HCC): ICD-10-CM

## 2022-01-31 DIAGNOSIS — G20 DYSKINESIA DUE TO PARKINSON'S DISEASE (HCC): ICD-10-CM

## 2022-01-31 DIAGNOSIS — G20 PARKINSONISM, UNSPECIFIED PARKINSONISM TYPE (HCC): Primary | ICD-10-CM

## 2022-01-31 PROCEDURE — G8417 CALC BMI ABV UP PARAM F/U: HCPCS | Performed by: PSYCHIATRY & NEUROLOGY

## 2022-01-31 PROCEDURE — G8753 SYS BP > OR = 140: HCPCS | Performed by: PSYCHIATRY & NEUROLOGY

## 2022-01-31 PROCEDURE — G8400 PT W/DXA NO RESULTS DOC: HCPCS | Performed by: PSYCHIATRY & NEUROLOGY

## 2022-01-31 PROCEDURE — G8427 DOCREV CUR MEDS BY ELIG CLIN: HCPCS | Performed by: PSYCHIATRY & NEUROLOGY

## 2022-01-31 PROCEDURE — G8432 DEP SCR NOT DOC, RNG: HCPCS | Performed by: PSYCHIATRY & NEUROLOGY

## 2022-01-31 PROCEDURE — 99214 OFFICE O/P EST MOD 30 MIN: CPT | Performed by: PSYCHIATRY & NEUROLOGY

## 2022-01-31 PROCEDURE — 1101F PT FALLS ASSESS-DOCD LE1/YR: CPT | Performed by: PSYCHIATRY & NEUROLOGY

## 2022-01-31 PROCEDURE — G8536 NO DOC ELDER MAL SCRN: HCPCS | Performed by: PSYCHIATRY & NEUROLOGY

## 2022-01-31 PROCEDURE — 1090F PRES/ABSN URINE INCON ASSESS: CPT | Performed by: PSYCHIATRY & NEUROLOGY

## 2022-01-31 PROCEDURE — G8755 DIAS BP > OR = 90: HCPCS | Performed by: PSYCHIATRY & NEUROLOGY

## 2022-01-31 PROCEDURE — G9899 SCRN MAM PERF RSLTS DOC: HCPCS | Performed by: PSYCHIATRY & NEUROLOGY

## 2022-01-31 PROCEDURE — 3017F COLORECTAL CA SCREEN DOC REV: CPT | Performed by: PSYCHIATRY & NEUROLOGY

## 2022-01-31 NOTE — PATIENT INSTRUCTIONS
Decrease your Sinemet from 1.5 tablets at 8 AM, 12 noon and 4 PM to a dose of 1 tablet at the same times    Continue your low-dose and    Call or send a message in the patient portal in 2 weeks to let me know how this is doing    We will leave follow-up appointment open for right now until we see how this does

## 2022-01-31 NOTE — PROGRESS NOTES
[facility] and  [address] at [facility] at [address] [facility], [address] [facility] [address] [facility], [address] [place] [address]   [phone]              Chief Complaint   Patient presents with    Tremors     body movements have increased  from previous appt. Current Outpatient Medications   Medication Sig Dispense Refill    carbidopa (Lodosyn) 25 mg tab tablet TAKE 1 TABLET BY MOUTH THREE (3) TIMES DAILY. TAKE WITH SINEMET 270 Tablet 1    Allergy Relief, cetirizine, 10 mg tablet TAKE 1 TABLET BY MOUTH DAILY AS NEEDED FOR ALLERGIES. 90 Tablet 1    carbidopa-levodopa (SINEMET)  mg per tablet TAKE 1.5 TABLETS BY MOUTH AT 8 AM NOON AND 4  Tablet 1    losartan (COZAAR) 50 mg tablet Take 1 Tablet by mouth daily. 90 Tablet 3    furosemide (LASIX) 40 mg tablet Take 1 Tablet by mouth two (2) times a day. 180 Tablet 3    pregabalin (Lyrica) 50 mg capsule Take 50 mg by mouth three (3) times daily.  hydrOXYchloroQUINE (Plaquenil) 200 mg tablet Take 400 mg by mouth daily.  carvediloL (COREG) 12.5 mg tablet TAKE 1 TABLET BY MOUTH TWICE A DAY WITH MEALS 180 Tab 1    potassium chloride (KLOR-CON) 20 mEq pack TAKE 1 PACKET DAILY BY MOUTH 90 Packet 1    leflunomide (ARAVA) 20 mg tablet Take 1 Tab by mouth daily. 30 Tab 1    bacitracin-neomycin-polymyxin b-hydrocortisone (CORTISPORIN) 1 % ointment Apply  to affected area two (2) times a day. 15 g 11      Allergies   Allergen Reactions    Pcn [Penicillins] Swelling     Social History     Tobacco Use    Smoking status: Former Smoker     Quit date: 1993     Years since quittin.6    Smokeless tobacco: Never Used   Vaping Use    Vaping Use: Never used   Substance Use Topics    Alcohol use: Yes     Comment: social    Drug use: No     64-year-old lady who returns today for follow-up. She has Parkinson's.   Last visit with me was in October and at that time she was maintained on Sinemet and we added some low-dose then to counter her nausea. We sent her to the big program.    Review of the big program notes last being December 8 of last year finds that she had continued pain in standing but with good execution of seated exercises with improved effort. She had some regression from the previous weeks function that was attributed to left knee pain. Her goals of being able to rise from a chair without upper extremity assistance was met. She was to complete her home exercise program.  She was progressing to being able to don her pants without increased time and difficulty and to be more independent in dressing and her ambulation to 300 feet independently was progressing    Today she is with her daughter. She has been having extra movements. This gets worse if she tries to do something. Tolerating the medicine better with less nausea. Having some sleepiness    Examination  Visit Vitals  BP (!) 159/91   Pulse 72   Resp 18   Ht 5' 4\" (1.626 m)   Wt 88.9 kg (196 lb)   BMI 33.64 kg/m²     Very pleasant lady. She is awake and alert. Normal speech and language. Normal cognition. She is dyskinetic today. Mild cogwheeling at the wrist.  Mild bradykinesia. Arises from the chair unassisted. Has a good stride    Impression/Plan  Parkinson's with dyskinesias/worsening in that respect  Decrease Sinemet to a dose of 1 tablet 3 times daily  She will let me know in 2 weeks how this is doing and if she is doing better in terms of dyskinesia and her overall bradykinesia and walking is not affected we will maintain that  If she is not doing better or if her ambulation has gotten worse then we will go back to the 1.5 3 times daily dose and add Dave Leroy MD        This note was created using voice recognition software. Despite editing, there may be syntax errors.

## 2022-01-31 NOTE — PROGRESS NOTES
Visit Vitals  /78 (BP 1 Location: Left upper arm, BP Patient Position: Sitting) Comment: daughter states patient has position HTN   Pulse 72   Resp 18   Ht 5' 4\" (1.626 m)   Wt 88.9 kg (196 lb)   BMI 33.64 kg/m²

## 2022-03-19 PROBLEM — I51.89 GRADE I DIASTOLIC DYSFUNCTION: Status: ACTIVE | Noted: 2021-04-08

## 2022-03-19 PROBLEM — U07.1 COVID-19 VIRUS DETECTED: Status: ACTIVE | Noted: 2021-01-27

## 2022-03-20 PROBLEM — R26.9 GAIT DISORDER: Status: ACTIVE | Noted: 2021-05-03

## 2022-03-25 LAB — SARS-COV-2, NAA: NEGATIVE

## 2022-05-23 RX ORDER — CARBIDOPA AND LEVODOPA 25; 100 MG/1; MG/1
TABLET ORAL
Qty: 405 TABLET | Refills: 1 | Status: SHIPPED | OUTPATIENT
Start: 2022-05-23

## 2022-05-28 RX ORDER — LOSARTAN POTASSIUM 50 MG/1
TABLET ORAL
Qty: 90 TABLET | Refills: 3 | Status: SHIPPED | OUTPATIENT
Start: 2022-05-28

## 2022-06-06 RX ORDER — CARBIDOPA 25 MG/1
25 TABLET ORAL 3 TIMES DAILY
Qty: 270 TABLET | Refills: 1 | Status: SHIPPED | OUTPATIENT
Start: 2022-06-06

## 2022-12-17 RX ORDER — CARBIDOPA 25 MG/1
25 TABLET ORAL 3 TIMES DAILY
Qty: 270 TABLET | Refills: 1 | Status: SHIPPED | OUTPATIENT
Start: 2022-12-17

## 2023-02-06 ENCOUNTER — TELEPHONE (OUTPATIENT)
Dept: NEUROLOGY | Age: 73
End: 2023-02-06

## 2023-02-22 RX ORDER — CARBIDOPA AND LEVODOPA 25; 100 MG/1; MG/1
TABLET ORAL
Qty: 270 TABLET | Refills: 2 | Status: SHIPPED | OUTPATIENT
Start: 2023-02-22

## 2023-02-23 ENCOUNTER — OFFICE VISIT (OUTPATIENT)
Dept: NEUROLOGY | Age: 73
End: 2023-02-23
Payer: MEDICARE

## 2023-02-23 VITALS
HEART RATE: 67 BPM | DIASTOLIC BLOOD PRESSURE: 84 MMHG | TEMPERATURE: 96.8 F | RESPIRATION RATE: 20 BRPM | OXYGEN SATURATION: 95 % | SYSTOLIC BLOOD PRESSURE: 132 MMHG

## 2023-02-23 DIAGNOSIS — G24.9 DYSKINESIA DUE TO PARKINSON'S DISEASE (HCC): ICD-10-CM

## 2023-02-23 DIAGNOSIS — G20 PARKINSONISM, UNSPECIFIED PARKINSONISM TYPE (HCC): Primary | ICD-10-CM

## 2023-02-23 DIAGNOSIS — G20 DYSKINESIA DUE TO PARKINSON'S DISEASE (HCC): ICD-10-CM

## 2023-02-23 PROCEDURE — 3075F SYST BP GE 130 - 139MM HG: CPT

## 2023-02-23 PROCEDURE — 3079F DIAST BP 80-89 MM HG: CPT

## 2023-02-23 PROCEDURE — 3017F COLORECTAL CA SCREEN DOC REV: CPT

## 2023-02-23 PROCEDURE — 1123F ACP DISCUSS/DSCN MKR DOCD: CPT

## 2023-02-23 PROCEDURE — 99214 OFFICE O/P EST MOD 30 MIN: CPT

## 2023-02-23 PROCEDURE — G8432 DEP SCR NOT DOC, RNG: HCPCS

## 2023-02-23 PROCEDURE — G8427 DOCREV CUR MEDS BY ELIG CLIN: HCPCS

## 2023-02-23 PROCEDURE — G8400 PT W/DXA NO RESULTS DOC: HCPCS

## 2023-02-23 PROCEDURE — 1101F PT FALLS ASSESS-DOCD LE1/YR: CPT

## 2023-02-23 PROCEDURE — G8421 BMI NOT CALCULATED: HCPCS

## 2023-02-23 PROCEDURE — G8536 NO DOC ELDER MAL SCRN: HCPCS

## 2023-02-23 PROCEDURE — 1090F PRES/ABSN URINE INCON ASSESS: CPT

## 2023-02-23 RX ORDER — FLUTICASONE PROPIONATE 50 MCG
SPRAY, SUSPENSION (ML) NASAL
COMMUNITY

## 2023-02-23 RX ORDER — DORZOLAMIDE HYDROCHLORIDE AND TIMOLOL MALEATE 20; 5 MG/ML; MG/ML
SOLUTION/ DROPS OPHTHALMIC
COMMUNITY
Start: 2023-01-19

## 2023-02-23 RX ORDER — ISRADIPINE 2.5 MG/1
CAPSULE ORAL
COMMUNITY
Start: 2022-04-13

## 2023-02-23 RX ORDER — SODIUM CHLORIDE 20 MG/ML
SOLUTION OPHTHALMIC
COMMUNITY
Start: 2023-01-19

## 2023-02-23 RX ORDER — ERGOCALCIFEROL 1.25 MG/1
CAPSULE ORAL
COMMUNITY
Start: 2022-03-01

## 2023-02-23 RX ORDER — FLUTICASONE PROPIONATE AND SALMETEROL 250; 50 UG/1; UG/1
POWDER RESPIRATORY (INHALATION)
COMMUNITY
Start: 2023-01-28

## 2023-02-23 RX ORDER — FAMOTIDINE 20 MG/1
20 TABLET, FILM COATED ORAL DAILY
COMMUNITY
Start: 2022-11-21

## 2023-02-23 RX ORDER — BUPROPION HYDROCHLORIDE 100 MG/1
100 TABLET, EXTENDED RELEASE ORAL DAILY
COMMUNITY
Start: 2023-01-10

## 2023-02-23 RX ORDER — OFLOXACIN 3 MG/ML
SOLUTION/ DROPS OPHTHALMIC
COMMUNITY
Start: 2023-01-19

## 2023-02-23 RX ORDER — BRIMONIDINE TARTRATE 1 MG/ML
SOLUTION/ DROPS OPHTHALMIC
COMMUNITY
Start: 2023-02-10

## 2023-02-23 RX ORDER — ASCORBIC ACID 500 MG
1 TABLET ORAL
COMMUNITY

## 2023-02-23 RX ORDER — PREDNISOLONE ACETATE 10 MG/ML
SUSPENSION/ DROPS OPHTHALMIC
COMMUNITY
Start: 2023-01-19

## 2023-02-23 RX ORDER — ZINC GLUCONATE 50 MG
TABLET ORAL AS NEEDED
COMMUNITY

## 2023-02-23 RX ORDER — ACETAMINOPHEN 500 MG
2 TABLET ORAL EVERY 8 HOURS
COMMUNITY

## 2023-02-23 NOTE — PROGRESS NOTES
Yrn Felix is a 74 y.o. female who presents with the following  Chief Complaint   Patient presents with    Follow-up     Parkinson's        HPI  Patient is here today with her daughter for Parkinson's follow-up. Patient was last seen January 31, 2022  by Dr. Macarena Arenas for increased dyskinesias. Dr. Macarena Arenas decreased the patient's Sinemet from 1-1/2 pills 3 times a day to to 1 tab 3 times a day to see if this would  make her dyskinesias and ambulation better. Today the patient's daughter stated that they did decrease the Sinemet to 1 tab 3 times a day and it did decrease her dyskinesias however she is having more \"twitching\" with intentional movement such as getting dressed. She has not had a ground-level fall but she did fall into her closet and scraped her back. Patient did have bilateral cataract removal since her last visit here. She also had COVID last month. Patient's daughter states that the patient has been being lazy and unmotivated when it comes to doing things. Her PCP prescribed Wellbutrin mid January to see if this would help her mood however, she states that she is not seeing much of an improvement yet. The patient and her daughter are interested in getting back into physical therapy and occupational therapy. She has been successful with PT and the big program and would like to participate in that again. Allergies   Allergen Reactions    Pcn [Penicillins] Swelling       Current Outpatient Medications   Medication Sig    empagliflozin (JARDIANCE) 10 mg tablet Take 10 mg by mouth Every morning. buPROPion SR (WELLBUTRIN SR) 100 mg SR tablet Take 100 mg by mouth daily. famotidine (PEPCID) 20 mg tablet Take 20 mg by mouth daily.     Wixela Inhub 250-50 mcg/dose diskus inhaler INHALE 1 PUFF BY INHALATION ROUTE TWICE A DAY APPROXIMATELY 12 HOURS APART AT THE SAME TIME EACH DAY    fluticasone propionate (FLONASE) 50 mcg/actuation nasal spray fluticasone propionate 50 mcg/actuation nasal spray,suspension   SPRAY 2 SPRAYS INTO EACH NOSTRIL EVERY DAY    isradipine (DYNACIRC) 2.5 mg capsule isradipine 2.5 mg capsule   TAKE 1-2 CAPSULE BY ORAL ROUTE EVERY DAY    ascorbic acid, vitamin C, (VITAMIN C) 500 mg tablet Take 1 Tablet by mouth. zinc gluconate 50 mg tablet as needed. ergocalciferol (ERGOCALCIFEROL) 1,250 mcg (50,000 unit) capsule TAKE 1 CAPSULE BY MOUTH ONE TIME PER WEEK    ofloxacin (OCUFLOX) 0.3 % ophthalmic solution INSTIL 1 DROP 4 TIMES A DAY INTO RIGHT EYE FOR POST OP USE    prednisoLONE acetate (PRED FORTE) 1 % ophthalmic suspension INSTIL 1 DROP EVERY 2 HOURS WHILE AWAKE INTO RIGHT EYE FOR POST OP USE    Carlos 128 2 % ophthalmic solution INSTILL 1 DROP INTO RIGHT EYE 4 TIMES A DAY    dorzolamide-timoloL (COSOPT) 22.3-6.8 mg/mL ophthalmic solution INSTILL 1 DROP INTO RIGHT EYE TWICE A DAY    Alphagan P 0.1 % ophthalmic solution INSTILL 1 DROP INTO RIGHT EYE 3 TIMES A DAY    acetaminophen (TYLENOL) 500 mg tablet Take 2 Tablets by mouth every eight (8) hours. carbidopa-levodopa (SINEMET)  mg per tablet TAKE 1.5 TABLETS BY MOUTH AT 8 AM NOON AND 4 PM    carbidopa (Lodosyn) 25 mg tab tablet TAKE 1 TABLET BY MOUTH THREE (3) TIMES DAILY. TAKE WITH SINEMET    losartan (COZAAR) 50 mg tablet TAKE 1 TABLET BY MOUTH EVERY DAY    Allergy Relief, cetirizine, 10 mg tablet TAKE 1 TABLET BY MOUTH DAILY AS NEEDED FOR ALLERGIES. furosemide (LASIX) 40 mg tablet Take 1 Tablet by mouth two (2) times a day. pregabalin (LYRICA) 50 mg capsule Take 75 mg by mouth two (2) times a day.    hydrOXYchloroQUINE (PLAQUENIL) 200 mg tablet Take 400 mg by mouth daily. carvediloL (COREG) 12.5 mg tablet TAKE 1 TABLET BY MOUTH TWICE A DAY WITH MEALS (Patient taking differently: 25 mg.)    potassium chloride (KLOR-CON) 20 mEq pack TAKE 1 PACKET DAILY BY MOUTH    leflunomide (ARAVA) 20 mg tablet Take 1 Tab by mouth daily.     bacitracin-neomycin-polymyxin b-hydrocortisone (CORTISPORIN) 1 % ointment Apply to affected area two (2) times a day. No current facility-administered medications for this visit. Social History     Tobacco Use   Smoking Status Former    Types: Cigarettes    Quit date: 1993    Years since quittin.7   Smokeless Tobacco Never       Past Medical History:   Diagnosis Date    AF (paroxysmal atrial fibrillation) ([facility])     md reno    Bronchitis     Chronic back pain     md georgi [facility]    CKD (chronic kidney disease), stage III ([facility] [address] 2019    selene solano md    COVID-19 virus detected 2021    Grade I diastolic dysfunction     ef 55 %    High cholesterol     Hypertension     mary landaverde md    LBBB (left bundle branch block) 2012    Neurogenic dysfunction of the urinary bladder     Obesity     Rheumatoid arthritis ([facility] [address]     tiffanie campbell md rheum -vcu    S/P TKR (total knee replacement), right     md yfn    [facility]        Past Surgical History:   Procedure Laterality Date    HC BLD ROTATABLE 4MM HR      HX BREAST REDUCTION Bilateral         HX MOHS PROCEDURES      right    [place] BREAST SURGERY PROCEDURE UNLISTED      breast reduction    [place] CORRECT BUNION,SIMPLE      [place] PLASTY KNEE,CONSTRAINED PROSTHESIS         Family History   Problem Relation Age of Onset    Dementia Mother     Alcohol abuse Father        Social History     Socioeconomic History    Marital status:    Tobacco Use    Smoking status: Former     Types: Cigarettes     Quit date: 1993     Years since quittin.7    Smokeless tobacco: Never   Vaping Use    Vaping Use: Never used   Substance and Sexual Activity    Alcohol use: Yes     Comment: social    Drug use: No    Sexual activity: Yes     Partners: Male   Social History Narrative    Habits:  She discontinued cigarettes about 25 years ago. She has occasional beer or glass of wine. No drug abuse. Social History:  The patient is .   Her daughter lives with her, who also takes care of her. She has four children, two sons and two daughters, alive and well. (Chencho Wilhelm) 45year old daughter  of MI. She has 17 grandchildren and 6 great grandchildren. Completed her GED and some college. Mu-ism preference is Qasim.     [phone]        Family History:  Father  77 of seizure disorder, alcohol abuse. Mother  66 with dementia. One brother . Review of Systems   Constitutional: Negative. Neurological:  Positive for tremors and weakness. Psychiatric/Behavioral:  Positive for depression. Remainder of comprehensive review is negative. Physical Exam :    Visit Vitals  /84 (BP 1 Location: Left upper arm, BP Patient Position: Sitting, BP Cuff Size: Adult)   Pulse 67   Temp 96.8 °F (36 °C)   Resp 20   SpO2 95%       General: Well defined, nourished, and groomed individual in no acute distress. Neck: Supple, nontender, no bruits, no pain with resistance to active range of motion. Musculoskeletal: Extremities revealed no edema and had full range of motion of joints. Psych: Good mood and bright affect    NEUROLOGICAL EXAMINATION:    Mental Status: Alert and oriented to person, place, and time    Cranial Nerves:    II, III, IV, VI: Visual acuity grossly intact. Visual fields are normal.    Pupils are equal, round, and reactive to light and accommodation. Extra-ocular movements are full and fluid. Fundoscopic exam was benign, no ptosis or nystagmus. V-XII: Hearing is grossly intact. Facial features are symmetric, with normal sensation and strength. The palate rises symmetrically and the tongue protrudes midline. Motor Examination: Normal tone, bulk, and strength, 3/5 muscle strength throughout. Coordination: Finger to nose was normal. No resting or intention tremor. Mild cogwheeling at the wrist.    Gait and Station: [facility] with a walker. No pronator drift.  No muscle wasting or fasiculations noted. Reflexes: DTRs 2+ throughout. Results for orders placed or performed in visit on 09/09/22   NOVEL CORONAVIRUS (COVID-19)   Result Value Ref Range    SARS-CoV-2, ANGELY Negative        Orders Placed This Encounter    REFERRAL TO PHYSICAL THERAPY     Referral Priority:   Routine     Referral Type:   PT/OT/ST     Referral Reason:   Specialty Services Required     Requested Specialty:   Physical Therapy     Number of Visits Requested:   1    empagliflozin (JARDIANCE) 10 mg tablet     Sig: Take 10 mg by mouth Every morning. buPROPion SR (WELLBUTRIN SR) 100 mg SR tablet     Sig: Take 100 mg by mouth daily. famotidine (PEPCID) 20 mg tablet     Sig: Take 20 mg by mouth daily. Wixela Inhub 250-50 mcg/dose diskus inhaler     Sig: INHALE 1 PUFF BY INHALATION ROUTE TWICE A DAY APPROXIMATELY 12 HOURS APART AT THE SAME TIME EACH DAY    fluticasone propionate (FLONASE) 50 mcg/actuation nasal spray     Sig: fluticasone propionate 50 mcg/actuation nasal spray,suspension   SPRAY 2 SPRAYS INTO EACH NOSTRIL EVERY DAY    isradipine (DYNACIRC) 2.5 mg capsule     Sig: isradipine 2.5 mg capsule   TAKE 1-2 CAPSULE BY ORAL ROUTE EVERY DAY    ascorbic acid, vitamin C, (VITAMIN C) 500 mg tablet     Sig: Take 1 Tablet by mouth. zinc gluconate 50 mg tablet     Sig: as needed.     ergocalciferol (ERGOCALCIFEROL) 1,250 mcg (50,000 unit) capsule     Sig: TAKE 1 CAPSULE BY MOUTH ONE TIME PER WEEK    ofloxacin (OCUFLOX) 0.3 % ophthalmic solution     Sig: INSTIL 1 DROP 4 TIMES A DAY INTO RIGHT EYE FOR POST OP USE    prednisoLONE acetate (PRED FORTE) 1 % ophthalmic suspension     Sig: INSTIL 1 DROP EVERY 2 HOURS WHILE AWAKE INTO RIGHT EYE FOR POST OP USE    Carlos 128 2 % ophthalmic solution     Sig: INSTILL 1 DROP INTO RIGHT EYE 4 TIMES A DAY    dorzolamide-timoloL (COSOPT) 22.3-6.8 mg/mL ophthalmic solution     Sig: INSTILL 1 DROP INTO RIGHT EYE TWICE A DAY    Alphagan P 0.1 % ophthalmic solution     Sig: INSTILL 1 DROP INTO RIGHT EYE 3 TIMES A DAY    acetaminophen (TYLENOL) 500 mg tablet     Sig: Take 2 Tablets by mouth every eight (8) hours. 1. Parkinsonism, unspecified Parkinsonism type ([address]    2. Dyskinesia due to Parkinson's disease ([address]      Since patient is having increased tremor with activity during the day, suggested that we increase the Sinemet to a pill and a half for her morning and afternoon doses and we will leave her evening dose at 1 pill. Patient's daughter feels this is a good idea. We will have the patient return in about 9 weeks to assess if this helped or brought back the dyskinesias.                   This note will not be viewable in [facility]

## 2023-03-29 ENCOUNTER — TRANSCRIBE ORDER (OUTPATIENT)
Dept: MAMMOGRAPHY | Age: 73
End: 2023-03-29

## 2023-03-29 DIAGNOSIS — Z12.31 OTHER SCREENING MAMMOGRAM: Primary | ICD-10-CM

## 2023-03-30 ENCOUNTER — HOSPITAL ENCOUNTER (OUTPATIENT)
Dept: MAMMOGRAPHY | Age: 73
Discharge: HOME | End: 2023-03-30
Payer: MEDICARE

## 2023-03-30 DIAGNOSIS — Z12.31 OTHER SCREENING MAMMOGRAM: ICD-10-CM

## 2023-03-30 PROCEDURE — 77067 SCR MAMMO BI INCL CAD: CPT

## 2023-04-26 NOTE — PROGRESS NOTES
Prema Galindo is a 74 y.o. female who presents with the following  Chief Complaint   Patient presents with    Follow-up     Medication evaluation- increased sinemet     Last office visit note  HPI  Patient is here today with her daughter for Parkinson's follow-up. Patient was last seen January 31, 2022  by Dr. Rodney Tinajero for increased dyskinesias. Dr. Rodney Tinajero decreased the patient's Sinemet from 1-1/2 pills 3 times a day to to 1 tab 3 times a day to see if this would  make her dyskinesias and ambulation better. Today the patient's daughter stated that they did decrease the Sinemet to 1 tab 3 times a day and it did decrease her dyskinesias however she is having more \"twitching\" with intentional movement such as getting dressed. She has not had a ground-level fall but she did fall into her closet and scraped her back. Patient did have bilateral cataract removal since her last visit here. She also had COVID last month. Patient's daughter states that the patient has been being lazy and unmotivated when it comes to doing things. Her PCP prescribed Wellbutrin mid January to see if this would help her mood however, she states that she is not seeing much of an improvement yet. The patient and her daughter are interested in getting back into physical therapy and occupational therapy. She has been successful with PT and the big program and would like to participate in that again. Since patient is having increased tremor with activity during the day, suggested that we increase the Sinemet to a pill and a half for her morning and afternoon doses and we will leave her evening dose at 1 pill. Patient's daughter feels this is a good idea. We will have the patient return in about 9 weeks to assess if this helped or brought back the dyskinesias    Today's office visit note  HPI:  Patient is here today with her daughter for Parkinson's follow up.  Since her last visit, she increased her Sinemet to 1 1/2 pills at 8 & 12 and kept the 4 pm  dose at 1 pill. The patient states that her twitching movements are worse when she is nervous however, the patient's daughter states that is occurring more than that. Patient states that if she is doing a lot her legs will stiffen up. The patient's daughter states that she does not want to change her medication dose again at this visit because the patient is going through a lot of other health problems at this time. She was recently admitted to [facility] for issues with not eating and nausea with a 15 lb weight loss. She had a cardiac cath on April 18 without stent placement. She had a CT of her abdomen that showed a stone in her bladder and a mass that turned out to be a cyst after biopsy. She is due for an endoscopy on May 1. She did not get physical therapy yet because of the hospitalizations. She stated that when she left the hospital they did order physical therapy for her and this has been set up to start in her home and then they plan to transition that to an outpatient setting. Patient stated that she has fallen once in March and once in April. Again the patient's daughter states that the patient is being unmotivated to do any activity. Neither of them feel that the Wellbutrin is doing anything to improve her mood. Allergies   Allergen Reactions    Pcn [Penicillins] Swelling       Current Outpatient Medications   Medication Sig    empagliflozin (JARDIANCE) 10 mg tablet Take 10 mg by mouth Every morning. buPROPion SR (WELLBUTRIN SR) 100 mg SR tablet Take 100 mg by mouth daily. famotidine (PEPCID) 20 mg tablet Take 20 mg by mouth daily.     Wixela Inhub 250-50 mcg/dose diskus inhaler INHALE 1 PUFF BY INHALATION ROUTE TWICE A DAY APPROXIMATELY 12 HOURS APART AT THE SAME TIME EACH DAY    fluticasone propionate (FLONASE) 50 mcg/actuation nasal spray fluticasone propionate 50 mcg/actuation nasal spray,suspension   SPRAY 2 SPRAYS INTO EACH NOSTRIL EVERY DAY    isradipine [facility]) 2.5 mg capsule isradipine 2.5 mg capsule   TAKE 1-2 CAPSULE BY ORAL ROUTE EVERY DAY    ascorbic acid, vitamin C, (VITAMIN C) 500 mg tablet Take 1 Tablet by mouth. zinc gluconate 50 mg tablet as needed. ergocalciferol (ERGOCALCIFEROL) 1,250 mcg (50,000 unit) capsule TAKE 1 CAPSULE BY MOUTH ONE TIME PER WEEK    ofloxacin (OCUFLOX) 0.3 % ophthalmic solution INSTIL 1 DROP 4 TIMES A DAY INTO RIGHT EYE FOR POST OP USE    prednisoLONE acetate (PRED FORTE) 1 % ophthalmic suspension INSTIL 1 DROP EVERY 2 HOURS WHILE AWAKE INTO RIGHT EYE FOR POST OP USE    Carlos 128 2 % ophthalmic solution INSTILL 1 DROP INTO RIGHT EYE 4 TIMES A DAY    dorzolamide-timoloL (COSOPT) 22.3-6.8 mg/mL ophthalmic solution INSTILL 1 DROP INTO RIGHT EYE TWICE A DAY    Alphagan P 0.1 % ophthalmic solution INSTILL 1 DROP INTO RIGHT EYE 3 TIMES A DAY    acetaminophen (TYLENOL) 500 mg tablet Take 2 Tablets by mouth every eight (8) hours. carbidopa-levodopa (SINEMET)  mg per tablet TAKE 1.5 TABLETS BY MOUTH AT 8 AM NOON AND 4 PM    carbidopa (Lodosyn) 25 mg tab tablet TAKE 1 TABLET BY MOUTH THREE (3) TIMES DAILY. TAKE WITH SINEMET    losartan (COZAAR) 50 mg tablet TAKE 1 TABLET BY MOUTH EVERY DAY    Allergy Relief, cetirizine, 10 mg tablet TAKE 1 TABLET BY MOUTH DAILY AS NEEDED FOR ALLERGIES. furosemide (LASIX) 40 mg tablet Take 1 Tablet by mouth two (2) times a day. pregabalin (LYRICA) 50 mg capsule Take 75 mg by mouth two (2) times a day.    hydrOXYchloroQUINE (PLAQUENIL) 200 mg tablet Take 400 mg by mouth daily. carvediloL (COREG) 12.5 mg tablet TAKE 1 TABLET BY MOUTH TWICE A DAY WITH MEALS (Patient taking differently: 25 mg.)    potassium chloride (KLOR-CON) 20 mEq pack TAKE 1 PACKET DAILY BY MOUTH    leflunomide (ARAVA) 20 mg tablet Take 1 Tab by mouth daily. bacitracin-neomycin-polymyxin b-hydrocortisone (CORTISPORIN) 1 % ointment Apply  to affected area two (2) times a day.      No current facility-administered medications for this visit. Social History     Tobacco Use   Smoking Status Former    Types: Cigarettes    Quit date: 1993    Years since quittin.8   Smokeless Tobacco Never       Past Medical History:   Diagnosis Date    AF (paroxysmal atrial fibrillation) ([facility])     md reno    Bronchitis     Chronic back pain     md georgi [facility]    CKD (chronic kidney disease), stage III ([facility] [address] 2019    selene solano md    COVID-19 virus detected 2021    Grade I diastolic dysfunction     ef 55 %    High cholesterol     Hypertension     mary landaverde md    LBBB (left bundle branch block) 2012    Neurogenic dysfunction of the urinary bladder     Obesity     Rheumatoid arthritis ([facility] [address]     tiffanie campbell md rheum -vcu    S/P TKR (total knee replacement), right     md yfn    Ulcer of right groin ([facility] [address]        Past Surgical History:   Procedure Laterality Date    HC BLD ROTATABLE 4MM HR      HX BREAST REDUCTION Bilateral         HX MOHS PROCEDURES      right    [place] ARTHROPLASTY KNEE HINGE PROSTHESIS      [place] CORRECT BUNION,SIMPLE      [place] UNLISTED PROCEDURE BREAST      breast reduction       Family History   Problem Relation Age of Onset    Dementia Mother     Alcohol abuse Father        Social History     Socioeconomic History    Marital status:    Tobacco Use    Smoking status: Former     Types: Cigarettes     Quit date: 1993     Years since quittin.8    Smokeless tobacco: Never   Vaping Use    Vaping Use: Never used   Substance and Sexual Activity    Alcohol use: Yes     Comment: social    Drug use: No    Sexual activity: Yes     Partners: Male   Social History Narrative    Habits:  She discontinued cigarettes about 25 years ago. She has occasional beer or glass of wine. No drug abuse. Social History:  The patient is . Her daughter lives with her, who also takes care of her.   She has four children, two sons and two daughters, alive and well. Jason Gandara) 45year old daughter  of MI. She has 17 grandchildren and 6 great grandchildren. Completed her GED and some college. Mormon preference is Qasim.     [phone]        Family History:  Father  77 of seizure disorder, alcohol abuse. Mother  66 with dementia. One brother . Review of Systems   Musculoskeletal:  Positive for falls. Neurological:  Positive for tremors and weakness. Psychiatric/Behavioral:  Positive for depression. Remainder of comprehensive review is negative. Physical Exam :    Visit Vitals  /88 (BP 1 Location: Left upper arm, BP Patient Position: Sitting, BP Cuff Size: Adult)   Pulse 72   Temp (!) 96.6 °F (35.9 °C)   Resp 20   SpO2 98%       General: Well defined, nourished, and groomed individual in no acute distress. Neck: Supple, nontender, no bruits, no pain with resistance to active range of motion. Musculoskeletal: Extremities revealed no edema and had full range of motion of joints. Psych: Good mood and bright affect    NEUROLOGICAL EXAMINATION:    Mental Status: Alert and oriented to person, place, and time    Cranial Nerves:    II, III, IV, VI: Visual acuity grossly intact. Visual fields are normal.    Pupils are equal, round, and reactive to light and accommodation. Extra-ocular movements are full and fluid. Fundoscopic exam was benign, no ptosis or nystagmus. V-XII: Hearing is grossly intact. Facial features are symmetric, with normal sensation and strength. The palate rises symmetrically and the tongue protrudes midline. Motor Examination: Normal tone, bulk, and strength, 3/5 muscle strength throughout. Coordination: Finger to nose was normal. No resting or intention tremor. Cogwheeling R>L. Gait and Station: [employer] with a walker. No pronator drift. No muscle wasting or fasiculations noted. Reflexes: DTRs 2+ throughout.     Results for orders placed or performed in visit on 09/09/22   NOVEL CORONAVIRUS (COVID-19)   Result Value Ref Range    SARS-CoV-2, ANGELY Negative        No orders of the defined types were placed in this encounter. 1. Parkinsonism, unspecified Parkinsonism type ([facility] [address]    2. Dyskinesia due to Parkinson's disease [facility])    Per the patient's daughter's request, the patient will continue to take Sinemet 1-1/2 pills 8 AM, 12 PM and 1 pill at 4 PM.  Physical therapy has been set up by the hospital after her admission. Patient has a follow-up with Dr. Irene Moreno in 2 months that she will keep.               This note will not be viewable in [employer]

## 2023-04-27 ENCOUNTER — OFFICE VISIT (OUTPATIENT)
Dept: NEUROLOGY | Age: 73
End: 2023-04-27
Payer: MEDICARE

## 2023-04-27 VITALS
DIASTOLIC BLOOD PRESSURE: 88 MMHG | SYSTOLIC BLOOD PRESSURE: 130 MMHG | HEART RATE: 72 BPM | RESPIRATION RATE: 20 BRPM | TEMPERATURE: 96.6 F | OXYGEN SATURATION: 98 %

## 2023-04-27 DIAGNOSIS — G20 DYSKINESIA DUE TO PARKINSON'S DISEASE (HCC): ICD-10-CM

## 2023-04-27 DIAGNOSIS — G24.9 DYSKINESIA DUE TO PARKINSON'S DISEASE (HCC): ICD-10-CM

## 2023-04-27 DIAGNOSIS — G20 PARKINSONISM, UNSPECIFIED PARKINSONISM TYPE (HCC): Primary | ICD-10-CM

## 2023-04-27 PROCEDURE — G8421 BMI NOT CALCULATED: HCPCS

## 2023-04-27 PROCEDURE — 99214 OFFICE O/P EST MOD 30 MIN: CPT

## 2023-04-27 PROCEDURE — G8432 DEP SCR NOT DOC, RNG: HCPCS

## 2023-04-27 PROCEDURE — G9899 SCRN MAM PERF RSLTS DOC: HCPCS

## 2023-04-27 PROCEDURE — 3017F COLORECTAL CA SCREEN DOC REV: CPT

## 2023-04-27 PROCEDURE — 1123F ACP DISCUSS/DSCN MKR DOCD: CPT

## 2023-04-27 PROCEDURE — G8427 DOCREV CUR MEDS BY ELIG CLIN: HCPCS

## 2023-04-27 PROCEDURE — 3079F DIAST BP 80-89 MM HG: CPT

## 2023-04-27 PROCEDURE — 1090F PRES/ABSN URINE INCON ASSESS: CPT

## 2023-04-27 PROCEDURE — 1101F PT FALLS ASSESS-DOCD LE1/YR: CPT

## 2023-04-27 PROCEDURE — G8536 NO DOC ELDER MAL SCRN: HCPCS

## 2023-04-27 PROCEDURE — G8400 PT W/DXA NO RESULTS DOC: HCPCS

## 2023-04-27 PROCEDURE — 3075F SYST BP GE 130 - 139MM HG: CPT

## 2023-06-27 ENCOUNTER — OFFICE VISIT (OUTPATIENT)
Age: 73
End: 2023-06-27
Payer: MEDICARE

## 2023-06-27 VITALS — DIASTOLIC BLOOD PRESSURE: 74 MMHG | HEART RATE: 70 BPM | OXYGEN SATURATION: 90 % | SYSTOLIC BLOOD PRESSURE: 112 MMHG

## 2023-06-27 DIAGNOSIS — G20 PARKINSON'S DISEASE (HCC): Primary | ICD-10-CM

## 2023-06-27 DIAGNOSIS — R26.9 GAIT ABNORMALITY: ICD-10-CM

## 2023-06-27 PROCEDURE — 3074F SYST BP LT 130 MM HG: CPT | Performed by: PSYCHIATRY & NEUROLOGY

## 2023-06-27 PROCEDURE — 3078F DIAST BP <80 MM HG: CPT | Performed by: PSYCHIATRY & NEUROLOGY

## 2023-06-27 PROCEDURE — 99214 OFFICE O/P EST MOD 30 MIN: CPT | Performed by: PSYCHIATRY & NEUROLOGY

## 2023-06-27 PROCEDURE — 1123F ACP DISCUSS/DSCN MKR DOCD: CPT | Performed by: PSYCHIATRY & NEUROLOGY

## 2023-06-29 RX ORDER — LOSARTAN POTASSIUM 50 MG/1
TABLET ORAL
Qty: 1 TABLET | Refills: 0 | Status: SHIPPED | OUTPATIENT
Start: 2023-06-29

## 2023-08-17 RX ORDER — CARBIDOPA 25 MG/1
TABLET ORAL
Qty: 270 TABLET | Refills: 1 | Status: SHIPPED | OUTPATIENT
Start: 2023-08-17

## 2023-09-01 ENCOUNTER — TRANSCRIBE ORDERS (OUTPATIENT)
Facility: HOSPITAL | Age: 73
End: 2023-09-01

## 2023-09-01 DIAGNOSIS — N17.9 ACUTE RENAL FAILURE, UNSPECIFIED ACUTE RENAL FAILURE TYPE (HCC): Primary | ICD-10-CM

## 2023-10-30 PROBLEM — D63.1 ANEMIA DUE TO CHRONIC RENAL FAILURE TREATED WITH ERYTHROPOIETIN, STAGE 4 (SEVERE) (HCC): Status: ACTIVE | Noted: 2023-10-30

## 2023-10-30 PROBLEM — E61.1 IRON DEFICIENCY: Status: ACTIVE | Noted: 2023-10-30

## 2023-10-30 PROBLEM — N18.4 ANEMIA DUE TO CHRONIC RENAL FAILURE TREATED WITH ERYTHROPOIETIN, STAGE 4 (SEVERE) (HCC): Status: ACTIVE | Noted: 2023-10-30

## 2023-11-09 ENCOUNTER — HOSPITAL ENCOUNTER (OUTPATIENT)
Facility: HOSPITAL | Age: 73
Setting detail: INFUSION SERIES
End: 2023-11-09

## 2023-11-13 ENCOUNTER — HOSPITAL ENCOUNTER (OUTPATIENT)
Facility: HOSPITAL | Age: 73
Setting detail: INFUSION SERIES
End: 2023-11-13

## 2023-11-16 ENCOUNTER — HOSPITAL ENCOUNTER (OUTPATIENT)
Facility: HOSPITAL | Age: 73
Setting detail: INFUSION SERIES
End: 2023-11-16

## 2023-11-20 ENCOUNTER — HOSPITAL ENCOUNTER (OUTPATIENT)
Facility: HOSPITAL | Age: 73
Setting detail: INFUSION SERIES
End: 2023-11-20

## 2023-11-24 ENCOUNTER — HOSPITAL ENCOUNTER (OUTPATIENT)
Facility: HOSPITAL | Age: 73
Setting detail: INFUSION SERIES
End: 2023-11-24

## 2024-01-02 ENCOUNTER — OFFICE VISIT (OUTPATIENT)
Age: 74
End: 2024-01-02
Payer: MEDICARE

## 2024-01-02 DIAGNOSIS — G20.B2 PARKINSON'S DISEASE WITH DYSKINESIA AND FLUCTUATING MANIFESTATIONS: Primary | ICD-10-CM

## 2024-01-02 PROCEDURE — 1123F ACP DISCUSS/DSCN MKR DOCD: CPT | Performed by: PSYCHIATRY & NEUROLOGY

## 2024-01-02 PROCEDURE — 99214 OFFICE O/P EST MOD 30 MIN: CPT | Performed by: PSYCHIATRY & NEUROLOGY

## 2024-01-02 RX ORDER — PANTOPRAZOLE SODIUM 40 MG/1
TABLET, DELAYED RELEASE ORAL
COMMUNITY
Start: 2023-12-23

## 2024-01-02 RX ORDER — RASAGILINE 1 MG/1
1 TABLET ORAL DAILY
Qty: 30 TABLET | Refills: 4 | Status: SHIPPED | OUTPATIENT
Start: 2024-01-02

## 2024-01-02 RX ORDER — HYDRALAZINE HYDROCHLORIDE 25 MG/1
TABLET, FILM COATED ORAL
COMMUNITY
Start: 2023-12-23

## 2024-01-02 RX ORDER — OXYCODONE HYDROCHLORIDE 5 MG/1
TABLET ORAL
COMMUNITY

## 2024-01-02 RX ORDER — APIXABAN 2.5 MG/1
2.5 TABLET, FILM COATED ORAL 2 TIMES DAILY
COMMUNITY

## 2024-01-02 NOTE — PROGRESS NOTES
[facility] at [facility] at [address] [place] [address] [address] [facility] [address] [place] [address] [phone]              Chief Complaint   Patient presents with    Follow-up     Current Outpatient Medications   Medication Sig Dispense Refill    oxyCODONE (ROXICODONE) 5 MG immediate release tablet TAKE 1 TABLET BY ORAL ROUTE EVERY 6 HOURS AS NEEDED FOR RIGHT GREAT TOE PAIN      ELIQUIS 2.5 MG TABS tablet Take 1 tablet by mouth 2 times daily      pantoprazole (PROTONIX) 40 MG tablet TAKE 1 TABLET BY MOUTH EVERY DAY FOR ACID REFLUX      hydrALAZINE (APRESOLINE) 25 MG tablet PLEASE SEE ATTACHED FOR DETAILED DIRECTIONS      rasagiline mesylate (AZILECT) 1 MG TABS Take 1 tablet by mouth daily 30 tablet 4    carbidopa (LODOSYN) 25 MG TABS tablet TAKE 1 TABLET BY MOUTH THREE (3) TIMES DAILY. TAKE WITH SINEMET 270 tablet 1    carbidopa-levodopa (SINEMET)  MG per tablet TAKE 1.5 TABLETS BY MOUTH AT 8 AM NOON AND 4 PM (Patient taking differently: TAKE TABLET BY MOUTH AT 8 AM 1 tab, NOON .5 tab, AND 4 PM .5 tab) 360 tablet 3    acetaminophen (TYLENOL) 500 MG tablet Take 2 tablets by mouth in the morning and 2 tablets at noon and 2 tablets in the evening.      bacitracin-neomycin-polymyxin b-hydrocortisone 1 % ointment Apply topically 2 times daily      brimonidine (ALPHAGAN P) 0.1 % SOLN INSTILL 1 DROP INTO RIGHT EYE 3 TIMES A DAY      buPROPion (WELLBUTRIN SR) 100 MG extended release tablet Take by mouth daily      carvedilol (COREG) 12.5 MG tablet TAKE 1 TABLET BY MOUTH TWICE A DAY WITH MEALS      cetirizine (ZYRTEC) 10 MG tablet TAKE 1 TABLET BY MOUTH DAILY AS NEEDED FOR ALLERGIES.      fluticasone (FLONASE) 50 MCG/ACT nasal spray fluticasone propionate 50 mcg/actuation nasal spray,suspension   SPRAY 2 SPRAYS INTO EACH NOSTRIL EVERY DAY      fluticasone-salmeterol (ADVAIR) 250-50

## 2024-01-02 NOTE — PROGRESS NOTES
Would like to discuss medication, said she missed doses for several days and symptoms were better  Would like to discuss the parkinsons Dx  Said they believe outside factors were making symptoms worse as well, multiple cases of covid, and mental issues  Has fallen recently

## 2024-01-31 DIAGNOSIS — G20.B2 PARKINSON'S DISEASE WITH DYSKINESIA AND FLUCTUATING MANIFESTATIONS: Primary | ICD-10-CM

## 2024-01-31 RX ORDER — RASAGILINE 1 MG/1
1 TABLET ORAL DAILY
Qty: 90 TABLET | Refills: 1 | Status: SHIPPED | OUTPATIENT
Start: 2024-01-31

## 2024-02-06 ENCOUNTER — TRANSCRIBE ORDERS (OUTPATIENT)
Facility: HOSPITAL | Age: 74
End: 2024-02-06

## 2024-02-06 DIAGNOSIS — Z12.31 SCREENING MAMMOGRAM, ENCOUNTER FOR: Primary | ICD-10-CM

## 2024-02-13 ENCOUNTER — TELEPHONE (OUTPATIENT)
Age: 74
End: 2024-02-13

## 2024-02-16 ENCOUNTER — TELEPHONE (OUTPATIENT)
Age: 74
End: 2024-02-16

## 2024-03-19 RX ORDER — CARBIDOPA 25 MG/1
TABLET ORAL
Qty: 270 TABLET | Refills: 1 | Status: SHIPPED | OUTPATIENT
Start: 2024-03-19

## 2024-04-02 ENCOUNTER — OFFICE VISIT (OUTPATIENT)
Age: 74
End: 2024-04-02
Payer: MEDICARE

## 2024-04-02 VITALS
HEIGHT: 64 IN | BODY MASS INDEX: 29.88 KG/M2 | SYSTOLIC BLOOD PRESSURE: 124 MMHG | HEART RATE: 68 BPM | OXYGEN SATURATION: 94 % | RESPIRATION RATE: 16 BRPM | DIASTOLIC BLOOD PRESSURE: 74 MMHG | WEIGHT: 175 LBS

## 2024-04-02 DIAGNOSIS — G20.B2 PARKINSON'S DISEASE WITH DYSKINESIA AND FLUCTUATING MANIFESTATIONS (HCC): Primary | ICD-10-CM

## 2024-04-02 DIAGNOSIS — R11.0 NAUSEA: ICD-10-CM

## 2024-04-02 PROCEDURE — 99214 OFFICE O/P EST MOD 30 MIN: CPT | Performed by: PSYCHIATRY & NEUROLOGY

## 2024-04-02 PROCEDURE — 1123F ACP DISCUSS/DSCN MKR DOCD: CPT | Performed by: PSYCHIATRY & NEUROLOGY

## 2024-04-02 PROCEDURE — 3078F DIAST BP <80 MM HG: CPT | Performed by: PSYCHIATRY & NEUROLOGY

## 2024-04-02 PROCEDURE — 3074F SYST BP LT 130 MM HG: CPT | Performed by: PSYCHIATRY & NEUROLOGY

## 2024-04-02 ASSESSMENT — PATIENT HEALTH QUESTIONNAIRE - PHQ9
SUM OF ALL RESPONSES TO PHQ QUESTIONS 1-9: 0
1. LITTLE INTEREST OR PLEASURE IN DOING THINGS: NOT AT ALL
SUM OF ALL RESPONSES TO PHQ QUESTIONS 1-9: 0
2. FEELING DOWN, DEPRESSED OR HOPELESS: NOT AT ALL
SUM OF ALL RESPONSES TO PHQ9 QUESTIONS 1 & 2: 0

## 2024-04-02 NOTE — PROGRESS NOTES
Cephalexin Nausea And Vomiting and Other (See Comments)     Memory issues.     Social History     Tobacco Use    Smoking status: Former     Current packs/day: 0.00     Types: Cigarettes     Quit date: 1993     Years since quittin.8    Smokeless tobacco: Never   Vaping Use    Vaping Use: Never used   Substance Use Topics    Alcohol use: Yes    Drug use: No     73-year-old lady following up Parkinson's.  I last saw her in January.  She had worsening bradykinesia and balance but dyskinesia with higher doses of Sinemet.  We maintained Sinemet and we added Azilect.  There was some misunderstanding in terms of which medicines she was supposed to be taking together.  I spoke with her daughter by phone today and we straighten that out.  She is not getting much of the home health physical therapy.  Still has some nausea with her meds.  She is nauseated today.  I have asked the nurse to give her some Zofran.    Examination  Resp 16   Ht 1.626 m (5' 4\")   Wt 79.4 kg (175 lb)   BMI 30.04 kg/m²   She is awake and alert.  She is hypophonic.  Bradykinetic with cogwheeling at the wrist bilaterally.  Right hand tremor.  Uses her walker    Impression/Plan  Parkinson's still with fluctuating symptoms and dyskinesia at higher doses of Sinemet  Discussed medications and we will continue the carbidopa along with the Sinemet 3 times daily and the Azilect once daily  Outpatient physical therapy  Follow-up in about 3 months    Significant nausea today  Zofran injection    Caty Espino MD        This note was created using voice recognition software. Despite editing, there may be syntax errors.

## 2024-04-09 ENCOUNTER — HOSPITAL ENCOUNTER (OUTPATIENT)
Facility: HOSPITAL | Age: 74
Discharge: HOME | End: 2024-04-12
Attending: INTERNAL MEDICINE
Payer: MEDICARE

## 2024-04-09 DIAGNOSIS — Z12.31 SCREENING MAMMOGRAM, ENCOUNTER FOR: ICD-10-CM

## 2024-04-09 PROCEDURE — 77067 SCR MAMMO BI INCL CAD: CPT

## 2024-06-12 ENCOUNTER — TELEPHONE (OUTPATIENT)
Age: 74
End: 2024-06-12

## 2024-06-13 NOTE — TELEPHONE ENCOUNTER
Return call to daughter- pt was recently hospitalized at [facility] - was admitted for 2 days  released last Thursday. Daughter states that they thought she was having a stroke but the neurology consult at [facility] told them that it was PD exacerbations. States meds were not changed.   Was told to follow up with us as soon as possible. Advised Dr Espino is out of the office.   Pt has 7/9/24 appt with Dr Espino. Wants to see if they can see Taylor for sooner follow up as they have seen her above.   Please advise if need/can be seen sooner or ok to wait for Kenzie?

## 2024-06-13 NOTE — TELEPHONE ENCOUNTER
S/w pt's daughter.  Pt was seen at [facility] for stroke-like sx.  She did see neuro there who felt that the sx may be caused by the rasagiline and advised pt discuss with Dr. Espino.  Pt has stage IV kidney dz and unstable BP.      Given bupropion and rasagiline may cause incr BP and daughter stated she had not really noticed an added benefit since starting the rasagiline, advised her to go ahead and stop giving it (verified with Gene Diaz NP)    Title BuPROPion / Monoamine Oxidase Inhibitors  Risk Rating X: Avoid combination  Summary Monoamine Oxidase Inhibitors may enhance the hypertensive effect of BuPROPion. Severity Major Reliability Rating Fair    Pt has appt with Dr. Espino on 7/9/24.  [facility] notes are in care everywhere.  Will discuss other therapies at appt.

## 2024-06-21 ENCOUNTER — TELEPHONE (OUTPATIENT)
Age: 74
End: 2024-06-21

## 2024-06-21 NOTE — TELEPHONE ENCOUNTER
Called patient no answer. Left message stating that I was calling to reschedule the appointment on 7/9/2024 due to Dr. Espino being out of office. Asked patient to call back to reschedule.

## 2024-06-21 NOTE — TELEPHONE ENCOUNTER
Called patient back spoke with daughter got appointment rescheduled for 7/16/2024 8:15 am with 8:00 am arrival time.

## 2024-06-21 NOTE — TELEPHONE ENCOUNTER
Patient's daughter called to re-schedule, not happy with appt. offered in   Dec.,\"unacceptable\", \"possible stroke\"    Requesting call back

## 2024-07-09 ENCOUNTER — HOSPITAL ENCOUNTER (OUTPATIENT)
Facility: HOSPITAL | Age: 74
Setting detail: INFUSION SERIES
Discharge: HOME | End: 2024-07-09
Payer: MEDICARE

## 2024-07-09 VITALS
HEART RATE: 68 BPM | SYSTOLIC BLOOD PRESSURE: 137 MMHG | TEMPERATURE: 97.3 F | DIASTOLIC BLOOD PRESSURE: 78 MMHG | RESPIRATION RATE: 18 BRPM

## 2024-07-09 DIAGNOSIS — N18.4 ANEMIA DUE TO CHRONIC RENAL FAILURE TREATED WITH ERYTHROPOIETIN, STAGE 4 (SEVERE) (HCC): Primary | ICD-10-CM

## 2024-07-09 DIAGNOSIS — E61.1 IRON DEFICIENCY: ICD-10-CM

## 2024-07-09 DIAGNOSIS — D63.1 ANEMIA DUE TO CHRONIC RENAL FAILURE TREATED WITH ERYTHROPOIETIN, STAGE 4 (SEVERE) (HCC): Primary | ICD-10-CM

## 2024-07-09 LAB
ALBUMIN SERPL-MCNC: 3.2 G/DL (ref 3.5–5)
ANION GAP SERPL CALC-SCNC: 5 MMOL/L (ref 5–15)
BASOPHILS # BLD: 0 K/UL (ref 0–0.1)
BASOPHILS NFR BLD: 1 % (ref 0–1)
BUN SERPL-MCNC: 51 MG/DL (ref 6–20)
BUN/CREAT SERPL: 19 (ref 12–20)
CALCIUM SERPL-MCNC: 8.4 MG/DL (ref 8.5–10.1)
CHLORIDE SERPL-SCNC: 111 MMOL/L (ref 97–108)
CO2 SERPL-SCNC: 24 MMOL/L (ref 21–32)
CREAT SERPL-MCNC: 2.62 MG/DL (ref 0.55–1.02)
DIFFERENTIAL METHOD BLD: ABNORMAL
EOSINOPHIL # BLD: 0.1 K/UL (ref 0–0.4)
EOSINOPHIL NFR BLD: 2 % (ref 0–7)
ERYTHROCYTE [DISTWIDTH] IN BLOOD BY AUTOMATED COUNT: 13.5 % (ref 11.5–14.5)
GLUCOSE SERPL-MCNC: 93 MG/DL (ref 65–100)
HCT VFR BLD AUTO: 29.2 % (ref 35–47)
HGB BLD-MCNC: 9.2 G/DL (ref 11.5–16)
IMM GRANULOCYTES # BLD AUTO: 0 K/UL (ref 0–0.04)
IMM GRANULOCYTES NFR BLD AUTO: 0 % (ref 0–0.5)
LYMPHOCYTES # BLD: 1.1 K/UL (ref 0.8–3.5)
LYMPHOCYTES NFR BLD: 23 % (ref 12–49)
MCH RBC QN AUTO: 31.7 PG (ref 26–34)
MCHC RBC AUTO-ENTMCNC: 31.5 G/DL (ref 30–36.5)
MCV RBC AUTO: 100.7 FL (ref 80–99)
MONOCYTES # BLD: 0.4 K/UL (ref 0–1)
MONOCYTES NFR BLD: 8 % (ref 5–13)
NEUTS SEG # BLD: 3.4 K/UL (ref 1.8–8)
NEUTS SEG NFR BLD: 66 % (ref 32–75)
NRBC # BLD: 0 K/UL (ref 0–0.01)
NRBC BLD-RTO: 0 PER 100 WBC
PHOSPHATE SERPL-MCNC: 3.4 MG/DL (ref 2.6–4.7)
PLATELET # BLD AUTO: 123 K/UL (ref 150–400)
PMV BLD AUTO: 11.9 FL (ref 8.9–12.9)
POTASSIUM SERPL-SCNC: 4.7 MMOL/L (ref 3.5–5.1)
RBC # BLD AUTO: 2.9 M/UL (ref 3.8–5.2)
SODIUM SERPL-SCNC: 140 MMOL/L (ref 136–145)
WBC # BLD AUTO: 5.1 K/UL (ref 3.6–11)

## 2024-07-09 PROCEDURE — 2580000003 HC RX 258: Performed by: INTERNAL MEDICINE

## 2024-07-09 PROCEDURE — 80069 RENAL FUNCTION PANEL: CPT

## 2024-07-09 PROCEDURE — 36415 COLL VENOUS BLD VENIPUNCTURE: CPT

## 2024-07-09 PROCEDURE — 96372 THER/PROPH/DIAG INJ SC/IM: CPT

## 2024-07-09 PROCEDURE — 85025 COMPLETE CBC W/AUTO DIFF WBC: CPT

## 2024-07-09 PROCEDURE — 96365 THER/PROPH/DIAG IV INF INIT: CPT

## 2024-07-09 PROCEDURE — 6360000002 HC RX W HCPCS: Performed by: INTERNAL MEDICINE

## 2024-07-09 RX ORDER — SODIUM CHLORIDE 9 MG/ML
5-250 INJECTION, SOLUTION INTRAVENOUS PRN
OUTPATIENT
Start: 2024-07-09

## 2024-07-09 RX ORDER — SODIUM CHLORIDE 9 MG/ML
5-250 INJECTION, SOLUTION INTRAVENOUS PRN
Status: DISCONTINUED | OUTPATIENT
Start: 2024-07-09 | End: 2024-07-10 | Stop reason: HOSPADM

## 2024-07-09 RX ADMIN — EPOETIN ALFA-EPBX 40000 UNITS: 40000 INJECTION, SOLUTION INTRAVENOUS; SUBCUTANEOUS at 15:21

## 2024-07-09 RX ADMIN — SODIUM CHLORIDE 200 MG: 9 INJECTION, SOLUTION INTRAVENOUS at 15:17

## 2024-07-09 RX ADMIN — SODIUM CHLORIDE 50 ML/HR: 9 INJECTION, SOLUTION INTRAVENOUS at 15:16

## 2024-07-09 NOTE — PLAN OF CARE
[facility] Short Note                       Date: 2024    Name: Wero Miguel    MRN: [id number]         : 1950      Pt admit to [facility] for Retacrit/Venofer ambulatory with walker in stable condition. Assessment completed and documented in flowsheets. No acute concerns at this time.  Please review pending lab results in CC.    Ms. Merediths vitals were reviewed prior to and after treatment.   Patient Vitals for the past 12 hrs:   Temp Pulse Resp BP   24 1541 -- -- 18 --   24 1440 97.3 °F (36.3 °C) 75 18 109/72     Lab results were obtained and reviewed. Labs within parameter for treatment.  Recent Results (from the past 12 hour(s))   Renal Function Panel    Collection Time: 24  2:47 PM   Result Value Ref Range    Sodium 140 136 - 145 mmol/L    Potassium 4.7 3.5 - 5.1 mmol/L    Chloride 111 (H) 97 - 108 mmol/L    CO2 24 21 - 32 mmol/L    Anion Gap 5 5 - 15 mmol/L    Glucose 93 65 - 100 mg/dL    BUN 51 (H) 6 - 20 MG/DL    Creatinine 2.62 (H) 0.55 - 1.02 MG/DL    BUN/Creatinine Ratio 19 12 - 20      Est, Glom Filt Rate 18 (L) >60 ml/min/1.73m2    Calcium 8.4 (L) 8.5 - 10.1 MG/DL    Phosphorus 3.4 2.6 - 4.7 MG/DL    Albumin 3.2 (L) 3.5 - 5.0 g/dL   CBC with Auto Differential    Collection Time: 24  2:47 PM   Result Value Ref Range    WBC 5.1 3.6 - 11.0 K/uL    RBC 2.90 (L) 3.80 - 5.20 M/uL    Hemoglobin 9.2 (L) 11.5 - 16.0 g/dL    Hematocrit 29.2 (L) 35.0 - 47.0 %    .7 (H) 80.0 - 99.0 FL    MCH 31.7 26.0 - 34.0 PG    MCHC 31.5 30.0 - 36.5 g/dL    RDW 13.5 11.5 - 14.5 %    Platelets 123 (L) 150 - 400 K/uL    MPV 11.9 8.9 - 12.9 FL    Nucleated RBCs 0.0 0  WBC    nRBC 0.00 0.00 - 0.01 K/uL    Neutrophils % 66 32 - 75 %    Lymphocytes % 23 12 - 49 %    Monocytes % 8 5 - 13 %    Eosinophils % 2 0 - 7 %    Basophils % 1 0 - 1 %    Immature Granulocytes % 0 0.0 - 0.5 %    Neutrophils Absolute 3.4 1.8 - 8.0 K/UL    Lymphocytes Absolute 1.1 0.8 - 3.5 K/UL    Monocytes Absolute 0.4  0.0 - 1.0 K/UL    Eosinophils Absolute 0.1 0.0 - 0.4 K/UL    Basophils Absolute 0.0 0.0 - 0.1 K/UL    Immature Granulocytes Absolute 0.0 0.00 - 0.04 K/UL    Differential Type AUTOMATED       Medications Administered         0.9 % sodium chloride infusion Admin Date  07/09/2024 Action  New Bag Dose  50 mL/hr Rate  50 mL/hr Route  IntraVENous Administered By  Edilma Leahy RN        epoetin olayinka-epbx (RETACRIT) injection 40,000 Units Admin Date  07/09/2024 Action  Given Dose  40,000 Units Rate   Route  SubCUTAneous Administered By  Edilma Leahy RN        iron sucrose (VENOFER) 200 mg in sodium chloride 0.9 % 100 mL IVPB Admin Date  07/09/2024 Action  New Bag Dose  200 mg Rate  480 mL/hr Route  IntraVENous Administered By  Edilma Leahy RN          PIV positive blood return noted, flushed and removed prior to discharge.    Ms. Miguel tolerated the infusion, and had no complaints.    [facility] and is aware of future appointments.     Future Appointments   Date Time Provider Department Center   7/16/2024  8:15 AM Caty Espino MD [id number]   8/6/2024  2:00 PM [facility]   8/7/2024 10:30 AM [facility]   9/3/2024  2:30 PM [facility]   10/1/2024  2:30 PM [facility]   4/10/2025 10:30 AM [facility] 1 [facility] [place]       Edilma Leahy RN  July 9, 2024  3:41 PM    Problem: Safety - Adult  Goal: Free from fall injury  Outcome: Progressing

## 2024-07-16 ENCOUNTER — OFFICE VISIT (OUTPATIENT)
Age: 74
End: 2024-07-16
Payer: MEDICARE

## 2024-07-16 VITALS
OXYGEN SATURATION: 95 % | BODY MASS INDEX: 29.88 KG/M2 | RESPIRATION RATE: 14 BRPM | HEART RATE: 88 BPM | WEIGHT: 175 LBS | HEIGHT: 64 IN | SYSTOLIC BLOOD PRESSURE: 121 MMHG | DIASTOLIC BLOOD PRESSURE: 72 MMHG

## 2024-07-16 DIAGNOSIS — G20.B2 PARKINSON'S DISEASE WITH DYSKINESIA AND FLUCTUATING MANIFESTATIONS (HCC): Primary | ICD-10-CM

## 2024-07-16 PROCEDURE — 99215 OFFICE O/P EST HI 40 MIN: CPT | Performed by: PSYCHIATRY & NEUROLOGY

## 2024-07-16 PROCEDURE — 3078F DIAST BP <80 MM HG: CPT | Performed by: PSYCHIATRY & NEUROLOGY

## 2024-07-16 PROCEDURE — 1123F ACP DISCUSS/DSCN MKR DOCD: CPT | Performed by: PSYCHIATRY & NEUROLOGY

## 2024-07-16 PROCEDURE — 3074F SYST BP LT 130 MM HG: CPT | Performed by: PSYCHIATRY & NEUROLOGY

## 2024-07-16 RX ORDER — MULTIVIT-MIN/FERROUS GLUCONATE 9 MG/15 ML
15 LIQUID (ML) ORAL DAILY
COMMUNITY

## 2024-07-16 NOTE — PROGRESS NOTES
Vomiting and Other (See Comments)     Memory issues.     Social History     Tobacco Use    Smoking status: Former     Current packs/day: 0.00     Types: Cigarettes     Quit date: 1993     Years since quittin.0    Smokeless tobacco: Never   Vaping Use    Vaping Use: Never used   Substance Use Topics    Alcohol use: Yes    Drug use: No     74-year-old lady following up for Parkinson's.  I saw her last in April.  We maintained Sinemet along with extra carbidopa and Azilect.  We requested physical therapy.  She was hypophonic bradykinetic with cogwheeling and a right hand tremor.  She is here with her daughter today.  Both provide history.  They recount her hospitalization at [facility].  She has been getting good physical and Occupational Therapy at home.  She is actually been able to walk in the apartment without her walker at times.  She still has difficulty with confidence level.  She is doing better with her walker.  Sinemet was increased to 1 tablet 3 times daily and she still doing the carbidopa as well.  Azilect has been discontinued.      From the [facility] system there is documentation from  where patient was said to be oriented x 3 with intact cranial nerves normal speech.  She was said to have right upper and lower extremity weaker than the left lower extremity with a right-sided facial droop.  She was said to been seen by neurology but I do not see a neurology note.  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

## 2024-08-02 ENCOUNTER — TELEPHONE (OUTPATIENT)
Age: 74
End: 2024-08-02

## 2024-08-02 NOTE — TELEPHONE ENCOUNTER
Return call to nurse  who had us listed as pt's PCP erroneously. Let her know that PCP is Dr Sierra and she will give him a call.

## 2024-08-07 ENCOUNTER — HOSPITAL ENCOUNTER (OUTPATIENT)
Facility: HOSPITAL | Age: 74
Setting detail: INFUSION SERIES
Discharge: HOME | End: 2024-08-07
Payer: MEDICARE

## 2024-08-07 VITALS
BODY MASS INDEX: 30.19 KG/M2 | OXYGEN SATURATION: 97 % | DIASTOLIC BLOOD PRESSURE: 71 MMHG | TEMPERATURE: 97 F | SYSTOLIC BLOOD PRESSURE: 120 MMHG | HEART RATE: 71 BPM | WEIGHT: 176.8 LBS | RESPIRATION RATE: 18 BRPM | HEIGHT: 64 IN

## 2024-08-07 DIAGNOSIS — N18.4 ANEMIA DUE TO CHRONIC RENAL FAILURE TREATED WITH ERYTHROPOIETIN, STAGE 4 (SEVERE) (HCC): ICD-10-CM

## 2024-08-07 DIAGNOSIS — D63.1 ANEMIA DUE TO CHRONIC RENAL FAILURE TREATED WITH ERYTHROPOIETIN, STAGE 4 (SEVERE) (HCC): ICD-10-CM

## 2024-08-07 LAB
ALBUMIN SERPL-MCNC: 3.3 G/DL (ref 3.5–5)
ANION GAP SERPL CALC-SCNC: 3 MMOL/L (ref 5–15)
BASOPHILS # BLD: 0.1 K/UL (ref 0–0.1)
BASOPHILS NFR BLD: 1 % (ref 0–1)
BUN SERPL-MCNC: 46 MG/DL (ref 6–20)
BUN/CREAT SERPL: 24 (ref 12–20)
CALCIUM SERPL-MCNC: 8.6 MG/DL (ref 8.5–10.1)
CHLORIDE SERPL-SCNC: 108 MMOL/L (ref 97–108)
CO2 SERPL-SCNC: 28 MMOL/L (ref 21–32)
CREAT SERPL-MCNC: 1.93 MG/DL (ref 0.55–1.02)
DIFFERENTIAL METHOD BLD: ABNORMAL
EOSINOPHIL # BLD: 0.1 K/UL (ref 0–0.4)
EOSINOPHIL NFR BLD: 2 % (ref 0–7)
ERYTHROCYTE [DISTWIDTH] IN BLOOD BY AUTOMATED COUNT: 13.9 % (ref 11.5–14.5)
GLUCOSE SERPL-MCNC: 84 MG/DL (ref 65–100)
HCT VFR BLD AUTO: 33.9 % (ref 35–47)
HGB BLD-MCNC: 10.6 G/DL (ref 11.5–16)
IMM GRANULOCYTES # BLD AUTO: 0 K/UL (ref 0–0.04)
IMM GRANULOCYTES NFR BLD AUTO: 0 % (ref 0–0.5)
LYMPHOCYTES # BLD: 1.5 K/UL (ref 0.8–3.5)
LYMPHOCYTES NFR BLD: 20 % (ref 12–49)
MCH RBC QN AUTO: 31.7 PG (ref 26–34)
MCHC RBC AUTO-ENTMCNC: 31.3 G/DL (ref 30–36.5)
MCV RBC AUTO: 101.5 FL (ref 80–99)
MONOCYTES # BLD: 0.5 K/UL (ref 0–1)
MONOCYTES NFR BLD: 6 % (ref 5–13)
NEUTS SEG # BLD: 5.4 K/UL (ref 1.8–8)
NEUTS SEG NFR BLD: 71 % (ref 32–75)
NRBC # BLD: 0 K/UL (ref 0–0.01)
NRBC BLD-RTO: 0 PER 100 WBC
PHOSPHATE SERPL-MCNC: 3 MG/DL (ref 2.6–4.7)
PLATELET # BLD AUTO: 127 K/UL (ref 150–400)
PMV BLD AUTO: 11.9 FL (ref 8.9–12.9)
POTASSIUM SERPL-SCNC: 4 MMOL/L (ref 3.5–5.1)
RBC # BLD AUTO: 3.34 M/UL (ref 3.8–5.2)
SODIUM SERPL-SCNC: 139 MMOL/L (ref 136–145)
WBC # BLD AUTO: 7.6 K/UL (ref 3.6–11)

## 2024-08-07 PROCEDURE — 36415 COLL VENOUS BLD VENIPUNCTURE: CPT

## 2024-08-07 PROCEDURE — 85025 COMPLETE CBC W/AUTO DIFF WBC: CPT

## 2024-08-07 PROCEDURE — 80069 RENAL FUNCTION PANEL: CPT

## 2024-08-07 ASSESSMENT — PAIN SCALES - GENERAL: PAINLEVEL_OUTOF10: 0

## 2024-08-07 NOTE — PROGRESS NOTES
[facility]        Date: 2024    Name: Wero Miguel    MRN: [id number]         : 1950    Ms. Miguel Arrived in wheelchair accompanied by daughter and in no distress for Retacrit (HELD) Regimen.  Assessment was completed, no acute issues at this time, no new complaints voiced.  Lab drawn peripherally by CHRISTOFER Bran and sent for processing.     HGB 10.6, out of parameter for today treatment.     Ms. Miguel's vitals were reviewed.  Patient Vitals for the past 12 hrs:   Temp Pulse Resp BP SpO2   24 1045 97 °F (36.1 °C) 71 18 120/71 97 %         Lab results were obtained and reviewed.  Recent Results (from the past 12 hour(s))   CBC with Auto Differential    Collection Time: 24 10:52 AM   Result Value Ref Range    WBC 7.6 3.6 - 11.0 K/uL    RBC 3.34 (L) 3.80 - 5.20 M/uL    Hemoglobin 10.6 (L) 11.5 - 16.0 g/dL    Hematocrit 33.9 (L) 35.0 - 47.0 %    .5 (H) 80.0 - 99.0 FL    MCH 31.7 26.0 - 34.0 PG    MCHC 31.3 30.0 - 36.5 g/dL    RDW 13.9 11.5 - 14.5 %    Platelets 127 (L) 150 - 400 K/uL    MPV 11.9 8.9 - 12.9 FL    Nucleated RBCs 0.0 0  WBC    nRBC 0.00 0.00 - 0.01 K/uL    Neutrophils % 71 32 - 75 %    Lymphocytes % 20 12 - 49 %    Monocytes % 6 5 - 13 %    Eosinophils % 2 0 - 7 %    Basophils % 1 0 - 1 %    Immature Granulocytes % 0 0.0 - 0.5 %    Neutrophils Absolute 5.4 1.8 - 8.0 K/UL    Lymphocytes Absolute 1.5 0.8 - 3.5 K/UL    Monocytes Absolute 0.5 0.0 - 1.0 K/UL    Eosinophils Absolute 0.1 0.0 - 0.4 K/UL    Basophils Absolute 0.1 0.0 - 0.1 K/UL    Immature Granulocytes Absolute 0.0 0.00 - 0.04 K/UL    Differential Type AUTOMATED     Renal Function Panel    Collection Time: 24 10:52 AM   Result Value Ref Range    Sodium 139 136 - 145 mmol/L    Potassium 4.0 3.5 - 5.1 mmol/L    Chloride 108 97 - 108 mmol/L    CO2 28 21 - 32 mmol/L    Anion Gap 3 (L) 5 - 15 mmol/L    Glucose 84 65 - 100 mg/dL    BUN 46 (H) 6 - 20 MG/DL    Creatinine 1.93

## 2024-08-08 ENCOUNTER — TELEPHONE (OUTPATIENT)
Age: 74
End: 2024-08-08

## 2024-08-08 NOTE — TELEPHONE ENCOUNTER
Return call to Chair at PT who states pt is progressing well with her PD therapy. V.O. given per Dr Espino to extend for more sessions.

## 2024-08-25 DIAGNOSIS — G20.B2 PARKINSON'S DISEASE WITH DYSKINESIA AND FLUCTUATING MANIFESTATIONS (HCC): Primary | ICD-10-CM

## 2024-08-26 RX ORDER — CARBIDOPA AND LEVODOPA 25; 100 MG/1; MG/1
TABLET ORAL
Qty: 360 TABLET | Refills: 3 | Status: SHIPPED | OUTPATIENT
Start: 2024-08-26

## 2024-08-26 NOTE — TELEPHONE ENCOUNTER
Called pt's daughter to verify carb/levo dose.  She is working to get back up to 1.5 tabs TID so this is how they would like the rx sent.

## 2024-08-29 PROBLEM — N63.10 MASS OF RIGHT BREAST: Status: ACTIVE | Noted: 2024-08-29

## 2024-08-29 RX ORDER — SODIUM CHLORIDE 9 MG/ML
5-250 INJECTION, SOLUTION INTRAVENOUS PRN
OUTPATIENT
Start: 2024-09-05

## 2024-08-29 RX ORDER — SODIUM CHLORIDE 0.9 % (FLUSH) 0.9 %
5-40 SYRINGE (ML) INJECTION PRN
OUTPATIENT
Start: 2024-09-05

## 2024-09-03 ENCOUNTER — HOSPITAL ENCOUNTER (OUTPATIENT)
Facility: HOSPITAL | Age: 74
Setting detail: INFUSION SERIES
End: 2024-09-03

## 2024-09-05 ENCOUNTER — HOSPITAL ENCOUNTER (OUTPATIENT)
Facility: HOSPITAL | Age: 74
Setting detail: INFUSION SERIES
End: 2024-09-05

## 2024-09-05 DIAGNOSIS — N63.10 MASS OF RIGHT BREAST, UNSPECIFIED QUADRANT: Primary | ICD-10-CM

## 2024-09-30 RX ORDER — CARBIDOPA 25 MG/1
TABLET ORAL
Qty: 270 TABLET | Refills: 1 | Status: SHIPPED | OUTPATIENT
Start: 2024-09-30

## 2024-10-03 ENCOUNTER — TELEPHONE (OUTPATIENT)
Age: 74
End: 2024-10-03

## 2024-10-03 NOTE — TELEPHONE ENCOUNTER
HIPAA Verified (if caller is someone other than patient):           Reason for Call:     Reason For Call:   [facility] ([facility])  just wanted to let the office know the patient has been discharged from Home Health.        Details from Caller:           Message: (any additional details from patient/caller not covered above)          Level 1 Calls - attempted to reach practice?      Reason Call Marked High Priority (if applicable):

## 2025-03-14 ENCOUNTER — HOSPITAL ENCOUNTER (OUTPATIENT)
Facility: HOSPITAL | Age: 75
Setting detail: INFUSION SERIES
Discharge: HOME | End: 2025-03-14
Payer: MEDICARE

## 2025-03-14 VITALS
OXYGEN SATURATION: 95 % | RESPIRATION RATE: 18 BRPM | TEMPERATURE: 97.9 F | HEART RATE: 80 BPM | SYSTOLIC BLOOD PRESSURE: 147 MMHG | DIASTOLIC BLOOD PRESSURE: 87 MMHG

## 2025-03-14 DIAGNOSIS — N18.4 ANEMIA DUE TO CHRONIC RENAL FAILURE TREATED WITH ERYTHROPOIETIN, STAGE 4 (SEVERE): Primary | ICD-10-CM

## 2025-03-14 DIAGNOSIS — D63.1 ANEMIA DUE TO CHRONIC RENAL FAILURE TREATED WITH ERYTHROPOIETIN, STAGE 4 (SEVERE): Primary | ICD-10-CM

## 2025-03-14 LAB
ALBUMIN SERPL-MCNC: 3.3 G/DL (ref 3.5–5)
ANION GAP SERPL CALC-SCNC: 7 MMOL/L (ref 2–12)
BASOPHILS # BLD: 0.05 K/UL (ref 0–0.1)
BASOPHILS NFR BLD: 0.6 % (ref 0–1)
BUN SERPL-MCNC: 34 MG/DL (ref 6–20)
BUN/CREAT SERPL: 17 (ref 12–20)
CALCIUM SERPL-MCNC: 9 MG/DL (ref 8.5–10.1)
CHLORIDE SERPL-SCNC: 109 MMOL/L (ref 97–108)
CO2 SERPL-SCNC: 25 MMOL/L (ref 21–32)
CREAT SERPL-MCNC: 2.04 MG/DL (ref 0.55–1.02)
DIFFERENTIAL METHOD BLD: ABNORMAL
EOSINOPHIL # BLD: 0.17 K/UL (ref 0–0.4)
EOSINOPHIL NFR BLD: 2.1 % (ref 0–7)
ERYTHROCYTE [DISTWIDTH] IN BLOOD BY AUTOMATED COUNT: 13.8 % (ref 11.5–14.5)
FERRITIN SERPL-MCNC: 448 NG/ML (ref 8–252)
GLUCOSE SERPL-MCNC: 85 MG/DL (ref 65–100)
HCT VFR BLD AUTO: 28.7 % (ref 35–47)
HGB BLD-MCNC: 9.1 G/DL (ref 11.5–16)
IMM GRANULOCYTES # BLD AUTO: 0.02 K/UL (ref 0–0.04)
IMM GRANULOCYTES NFR BLD AUTO: 0.2 % (ref 0–0.5)
IRON SATN MFR SERPL: 28 % (ref 20–50)
IRON SERPL-MCNC: 56 UG/DL (ref 35–150)
LYMPHOCYTES # BLD: 1.49 K/UL (ref 0.8–3.5)
LYMPHOCYTES NFR BLD: 18.5 % (ref 12–49)
MCH RBC QN AUTO: 32 PG (ref 26–34)
MCHC RBC AUTO-ENTMCNC: 31.7 G/DL (ref 30–36.5)
MCV RBC AUTO: 101.1 FL (ref 80–99)
MONOCYTES # BLD: 0.67 K/UL (ref 0–1)
MONOCYTES NFR BLD: 8.3 % (ref 5–13)
NEUTS SEG # BLD: 5.65 K/UL (ref 1.8–8)
NEUTS SEG NFR BLD: 70.3 % (ref 32–75)
NRBC # BLD: 0 K/UL (ref 0–0.01)
NRBC BLD-RTO: 0 PER 100 WBC
PHOSPHATE SERPL-MCNC: 2.7 MG/DL (ref 2.6–4.7)
PLATELET # BLD AUTO: 104 K/UL (ref 150–400)
PMV BLD AUTO: 12.2 FL (ref 8.9–12.9)
POTASSIUM SERPL-SCNC: 3.8 MMOL/L (ref 3.5–5.1)
RBC # BLD AUTO: 2.84 M/UL (ref 3.8–5.2)
SODIUM SERPL-SCNC: 141 MMOL/L (ref 136–145)
TIBC SERPL-MCNC: 200 UG/DL (ref 250–450)
WBC # BLD AUTO: 8.1 K/UL (ref 3.6–11)

## 2025-03-14 PROCEDURE — 83550 IRON BINDING TEST: CPT

## 2025-03-14 PROCEDURE — 6360000002 HC RX W HCPCS: Performed by: INTERNAL MEDICINE

## 2025-03-14 PROCEDURE — 85025 COMPLETE CBC W/AUTO DIFF WBC: CPT

## 2025-03-14 PROCEDURE — 96372 THER/PROPH/DIAG INJ SC/IM: CPT

## 2025-03-14 PROCEDURE — 82728 ASSAY OF FERRITIN: CPT

## 2025-03-14 PROCEDURE — 80069 RENAL FUNCTION PANEL: CPT

## 2025-03-14 PROCEDURE — 83540 ASSAY OF IRON: CPT

## 2025-03-14 RX ADMIN — EPOETIN ALFA-EPBX 40000 UNITS: 40000 INJECTION, SOLUTION INTRAVENOUS; SUBCUTANEOUS at 15:40

## 2025-03-14 ASSESSMENT — PAIN DESCRIPTION - DESCRIPTORS: DESCRIPTORS: ACHING

## 2025-03-14 ASSESSMENT — PAIN DESCRIPTION - LOCATION: LOCATION: TOE (COMMENT WHICH ONE)

## 2025-03-14 ASSESSMENT — PAIN DESCRIPTION - PAIN TYPE: TYPE: CHRONIC PAIN

## 2025-03-14 ASSESSMENT — PAIN SCALES - GENERAL: PAINLEVEL_OUTOF10: 4

## 2025-03-14 ASSESSMENT — PAIN DESCRIPTION - ORIENTATION: ORIENTATION: RIGHT

## 2025-03-14 NOTE — PROGRESS NOTES
[facility] Peds/Adult Note                       Date: 2025    Name: Wero Miguel    MRN: [id number]         : 1950    1440 Patient arrives for Retacrit q4wks without acute problems. Please see EPIC for complete assessment and education provided.    Vital signs stable throughout and prior to discharge. Patient tolerated procedure well and was discharged without incident.  Patient/Family member is aware of next [facility] appointment on 25.        Ms. Miguel's vitals were reviewed prior to and after treatment.   Patient Vitals for the past 12 hrs:   Temp Pulse Resp BP SpO2   25 1443 97.9 °F (36.6 °C) 80 18 (!) 147/87 95 %         Lab results were obtained and reviewed.  Recent Results (from the past 12 hours)   CBC with Auto Differential    Collection Time: 25  2:58 PM   Result Value Ref Range    WBC 8.1 3.6 - 11.0 K/uL    RBC 2.84 (L) 3.80 - 5.20 M/uL    Hemoglobin 9.1 (L) 11.5 - 16.0 g/dL    Hematocrit 28.7 (L) 35.0 - 47.0 %    .1 (H) 80.0 - 99.0 FL    MCH 32.0 26.0 - 34.0 PG    MCHC 31.7 30.0 - 36.5 g/dL    RDW 13.8 11.5 - 14.5 %    Platelets 104 (L) 150 - 400 K/uL    MPV 12.2 8.9 - 12.9 FL    Nucleated RBCs 0.0 0  WBC    nRBC 0.00 0.00 - 0.01 K/uL    Neutrophils % 70.3 32.0 - 75.0 %    Lymphocytes % 18.5 12.0 - 49.0 %    Monocytes % 8.3 5.0 - 13.0 %    Eosinophils % 2.1 0.0 - 7.0 %    Basophils % 0.6 0.0 - 1.0 %    Immature Granulocytes % 0.2 0.0 - 0.5 %    Neutrophils Absolute 5.65 1.80 - 8.00 K/UL    Lymphocytes Absolute 1.49 0.80 - 3.50 K/UL    Monocytes Absolute 0.67 0.00 - 1.00 K/UL    Eosinophils Absolute 0.17 0.00 - 0.40 K/UL    Basophils Absolute 0.05 0.00 - 0.10 K/UL    Immature Granulocytes Absolute 0.02 0.00 - 0.04 K/UL    Differential Type AUTOMATED         Medications given:   Medications Administered         epoetin olayinka-epbx (RETACRIT) injection 40,000 Units Admin Date  2025 Action  Given Dose  40,000 Units Route  SubCUTAneous Documented By  Rigo  CHRISTOFER Granda              Ms. Miguel tolerated the treatment and had no complaints.    [facility]. Discharge Instructions provided to patient, patient verbalized understanding.     Future Appointments   Date Time Provider Department [place]   4/10/2025 10:30 AM [facility] 1 [facility]   4/11/2025  2:00 PM [facility] 2 [facility]   5/9/2025  2:00 PM [facility]       Kalee Sierra RN  March 14, 2025  3:53 PM

## 2025-03-25 ENCOUNTER — TELEPHONE (OUTPATIENT)
Age: 75
End: 2025-03-25

## 2025-04-03 ENCOUNTER — OFFICE VISIT (OUTPATIENT)
Age: 75
End: 2025-04-03
Payer: MEDICARE

## 2025-04-03 VITALS
OXYGEN SATURATION: 94 % | SYSTOLIC BLOOD PRESSURE: 168 MMHG | DIASTOLIC BLOOD PRESSURE: 98 MMHG | RESPIRATION RATE: 16 BRPM

## 2025-04-03 DIAGNOSIS — G20.B2 PARKINSON'S DISEASE WITH DYSKINESIA AND FLUCTUATING MANIFESTATIONS (HCC): Primary | ICD-10-CM

## 2025-04-03 PROCEDURE — 3077F SYST BP >= 140 MM HG: CPT | Performed by: PSYCHIATRY & NEUROLOGY

## 2025-04-03 PROCEDURE — 3080F DIAST BP >= 90 MM HG: CPT | Performed by: PSYCHIATRY & NEUROLOGY

## 2025-04-03 PROCEDURE — 1159F MED LIST DOCD IN RCRD: CPT | Performed by: PSYCHIATRY & NEUROLOGY

## 2025-04-03 PROCEDURE — 99214 OFFICE O/P EST MOD 30 MIN: CPT | Performed by: PSYCHIATRY & NEUROLOGY

## 2025-04-03 PROCEDURE — 1123F ACP DISCUSS/DSCN MKR DOCD: CPT | Performed by: PSYCHIATRY & NEUROLOGY

## 2025-04-03 RX ORDER — ASPIRIN 81 MG/1
81 TABLET, CHEWABLE ORAL DAILY
COMMUNITY

## 2025-04-03 RX ORDER — ROSUVASTATIN CALCIUM 20 MG/1
1 TABLET, COATED ORAL DAILY
COMMUNITY
Start: 2024-10-01

## 2025-04-03 NOTE — PROGRESS NOTES
Smoking status: Former     Current packs/day: 0.00     Types: Cigarettes     Quit date: 1993     Years since quittin.8    Smokeless tobacco: Never   Vaping Use    Vaping status: Never Used   Substance Use Topics    Alcohol use: Yes    Drug use: No     History of Present Illness  74-year-old lady following up for Parkinson's.  She has been maintained on Sinemet along with extra carbidopa and Azilect.  Her examination at that time found rest tremor of the right hand.  She was a bit hypophonic.  Bradykinetic with hunched posture but was able to extend.  She was ambulating with a fairly good stride.  She had mild dyskinesia.  We send her to physical therapy and continued Sinemet.  She is with her daughter today and reports she is off Sinemet now for about 2 weeks.  Even with decreasing doses she was having worsening dyskinesia.  She is now in [facility] getting Parkinson's therapy.  Her daughter notes that she has seen improvement.    From the [facility] system   Documentation from  where patient was said to be oriented x 3 with intact cranial nerves normal speech.  She was said to have right upper and lower extremity weaker than the left lower extremity with a right-sided facial droop.  She was said to been seen by neurology but I do not see a neurology note.  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  She is hypophonic.  Hunched but is able to straighten.  She has rest tremor of the right upper and lower extremity.  Bradykinetic.  Has a fair stride with her

## 2025-04-11 ENCOUNTER — HOSPITAL ENCOUNTER (OUTPATIENT)
Facility: HOSPITAL | Age: 75
Setting detail: INFUSION SERIES
End: 2025-04-11

## 2025-05-14 RX ORDER — CARBIDOPA 25 MG/1
TABLET ORAL
Qty: 270 TABLET | Refills: 1 | Status: SHIPPED | OUTPATIENT
Start: 2025-05-14

## 2025-05-22 ENCOUNTER — TELEPHONE (OUTPATIENT)
Age: 75
End: 2025-05-22

## 2025-05-22 DIAGNOSIS — G20.B2 PARKINSON'S DISEASE WITH DYSKINESIA AND FLUCTUATING MANIFESTATIONS (HCC): Primary | ICD-10-CM

## 2025-05-22 DIAGNOSIS — F32.89 OTHER DEPRESSION: ICD-10-CM

## 2025-05-22 NOTE — TELEPHONE ENCOUNTER
Patient update from N[facility] PT with [facility].    The whole experience is a concern from the PT aspect.  Yovana, the daughter is choosing not to give the patient her PD medication.  She doesn't see as many dyskinesias when she is off the medication (per the daughter) PT strongly disagrees.      She is getting worse without medication.  She had her strongest times in March & April that she was at her peak able to transfer to chair and now she can't transfer at all. She has been in a decline since due to her family the PT believes.    Per PT the patient is freezing up with gait, and PT is seeing more issues with no medication being given.  Patient has lost the ability to transfer herself.  The family is causing more issues/stress.  The patient is showing signs of depression.    She is requesting a call or a FU with the patient.  Thanks.

## 2025-05-22 NOTE — TELEPHONE ENCOUNTER
S/w pt's daughter, she agrees that pt has declined somewhat since LOV however, she feels that is may be more so to do with pt's desire, or lack there of, to move and help herself.  The \"dyskinesias\" have continued to be better, however, pt just has no desire to help herself e.g. she will be sitting in her recliner with the remote sitting on her side table w/in arm's reach and instead of reaching to grab it, she will ask someone to hand it to her.      Yovana stated that she is no expert and may be wrong but that seems to be where pt is at at the present.  She says she is not sure if maybe the sinemet help with pt's motivation    Per Dr. Espino' LOV note 4/3/25:    Parkinson's with worsening dyskinesia with Sinemet and no dyskinesia on examination today  We have decided to keep her off the Sinemet for now and see how she improves with just therapy  Follow-up at the completion of physical therapy course        Caty Espino MD

## 2025-07-15 ENCOUNTER — TRANSCRIBE ORDERS (OUTPATIENT)
Facility: HOSPITAL | Age: 75
End: 2025-07-15

## 2025-07-15 DIAGNOSIS — L97.519 SKIN ULCER OF SECOND TOE, RIGHT, WITH UNSPECIFIED SEVERITY (HCC): Primary | ICD-10-CM

## 2025-07-16 ENCOUNTER — HOSPITAL ENCOUNTER (OUTPATIENT)
Facility: HOSPITAL | Age: 75
Discharge: HOME | End: 2025-07-19
Payer: MEDICARE

## 2025-07-16 DIAGNOSIS — L97.519 SKIN ULCER OF SECOND TOE, RIGHT, WITH UNSPECIFIED SEVERITY (HCC): ICD-10-CM

## 2025-07-16 PROCEDURE — 73718 MRI LOWER EXTREMITY W/O DYE: CPT
